# Patient Record
Sex: FEMALE | Race: BLACK OR AFRICAN AMERICAN | NOT HISPANIC OR LATINO | Employment: OTHER | URBAN - METROPOLITAN AREA
[De-identification: names, ages, dates, MRNs, and addresses within clinical notes are randomized per-mention and may not be internally consistent; named-entity substitution may affect disease eponyms.]

---

## 2017-03-21 ENCOUNTER — HOSPITAL ENCOUNTER (OUTPATIENT)
Dept: RADIOLOGY | Facility: HOSPITAL | Age: 67
Discharge: HOME | End: 2017-03-21
Attending: INTERNAL MEDICINE
Payer: MEDICARE

## 2017-03-21 ENCOUNTER — OFFICE VISIT (OUTPATIENT)
Dept: INTERNAL MEDICINE | Facility: CLINIC | Age: 67
End: 2017-03-21
Payer: MEDICARE

## 2017-03-21 VITALS
TEMPERATURE: 99 F | HEIGHT: 64 IN | SYSTOLIC BLOOD PRESSURE: 140 MMHG | DIASTOLIC BLOOD PRESSURE: 80 MMHG | BODY MASS INDEX: 33.27 KG/M2 | RESPIRATION RATE: 16 BRPM | WEIGHT: 194.88 LBS | HEART RATE: 74 BPM

## 2017-03-21 DIAGNOSIS — M17.0 PRIMARY OSTEOARTHRITIS OF BOTH KNEES: Chronic | ICD-10-CM

## 2017-03-21 DIAGNOSIS — I10 ESSENTIAL HYPERTENSION: Primary | Chronic | ICD-10-CM

## 2017-03-21 DIAGNOSIS — E78.5 HYPERLIPIDEMIA, UNSPECIFIED HYPERLIPIDEMIA TYPE: ICD-10-CM

## 2017-03-21 DIAGNOSIS — R05.9 COUGH: ICD-10-CM

## 2017-03-21 PROCEDURE — 1159F MED LIST DOCD IN RCRD: CPT | Mod: S$GLB,,, | Performed by: INTERNAL MEDICINE

## 2017-03-21 PROCEDURE — 99999 PR PBB SHADOW E&M-EST. PATIENT-LVL IV: CPT | Mod: PBBFAC,,, | Performed by: INTERNAL MEDICINE

## 2017-03-21 PROCEDURE — 1125F AMNT PAIN NOTED PAIN PRSNT: CPT | Mod: S$GLB,,, | Performed by: INTERNAL MEDICINE

## 2017-03-21 PROCEDURE — 71020 XR CHEST PA AND LATERAL: CPT | Mod: TC,PO

## 2017-03-21 PROCEDURE — 3074F SYST BP LT 130 MM HG: CPT | Mod: S$GLB,,, | Performed by: INTERNAL MEDICINE

## 2017-03-21 PROCEDURE — 73562 X-RAY EXAM OF KNEE 3: CPT | Mod: 50,TC,PO

## 2017-03-21 PROCEDURE — 1160F RVW MEDS BY RX/DR IN RCRD: CPT | Mod: S$GLB,,, | Performed by: INTERNAL MEDICINE

## 2017-03-21 PROCEDURE — 99214 OFFICE O/P EST MOD 30 MIN: CPT | Mod: S$GLB,,, | Performed by: INTERNAL MEDICINE

## 2017-03-21 PROCEDURE — 1157F ADVNC CARE PLAN IN RCRD: CPT | Mod: S$GLB,,, | Performed by: INTERNAL MEDICINE

## 2017-03-21 PROCEDURE — 71020 XR CHEST PA AND LATERAL: CPT | Mod: 26,,, | Performed by: RADIOLOGY

## 2017-03-21 PROCEDURE — 3079F DIAST BP 80-89 MM HG: CPT | Mod: S$GLB,,, | Performed by: INTERNAL MEDICINE

## 2017-03-21 PROCEDURE — 73562 X-RAY EXAM OF KNEE 3: CPT | Mod: 26,50,, | Performed by: RADIOLOGY

## 2017-03-21 RX ORDER — PROMETHAZINE HYDROCHLORIDE AND CODEINE PHOSPHATE 6.25; 1 MG/5ML; MG/5ML
5 SOLUTION ORAL EVERY 4 HOURS PRN
Qty: 240 ML | Refills: 0 | Status: SHIPPED | OUTPATIENT
Start: 2017-03-21 | End: 2017-03-31

## 2017-03-21 RX ORDER — NAPROXEN 500 MG/1
500 TABLET ORAL 2 TIMES DAILY PRN
Qty: 60 TABLET | Refills: 4 | Status: SHIPPED | OUTPATIENT
Start: 2017-03-21 | End: 2017-06-16 | Stop reason: SDUPTHER

## 2017-03-21 RX ORDER — METHOTREXATE 2.5 MG/1
TABLET ORAL
COMMUNITY
Start: 2017-02-22 | End: 2019-06-24

## 2017-03-21 RX ORDER — BENZONATATE 100 MG/1
CAPSULE ORAL
Qty: 40 CAPSULE | Refills: 1 | Status: SHIPPED | OUTPATIENT
Start: 2017-03-21 | End: 2017-08-15 | Stop reason: ALTCHOICE

## 2017-03-28 NOTE — PROGRESS NOTES
Subjective:       Patient ID: Miranda Houston is a 66 y.o. female.    Chief Complaint: Follow-up    HPI   the patient presents with complaints of cough and chest congestion of 2 months duration.  She has been using OTC medications without obtaining any relief.  Cough is productive of purulent sputum but she denies having any wheezing, shortness of breath, or chest pain.  No fever has been noted.    She has bilateral knee joint pain.  She does not have an anti-inflammatory medication on a and to use at the present time.    She has hypertension and hyperlipidemia.  She is tolerating her blood pressure medication well without side effects.  Review of Systems   Constitutional: Negative for activity change, appetite change, fatigue and unexpected weight change.   Eyes: Negative for visual disturbance.   Respiratory: Positive for cough and chest tightness. Negative for shortness of breath and wheezing.    Cardiovascular: Negative for chest pain, palpitations and leg swelling.   Gastrointestinal: Negative for abdominal pain, blood in stool, constipation and diarrhea.   Genitourinary: Negative for dysuria and hematuria.   Musculoskeletal: Positive for arthralgias. Negative for neck pain and neck stiffness.   Skin: Negative for rash.   Neurological: Negative for dizziness, syncope and headaches.   Psychiatric/Behavioral: Negative for sleep disturbance.       Objective:      Physical Exam   Constitutional: She is oriented to person, place, and time. She appears well-developed and well-nourished. No distress.   The patient has lost 5 pounds since 8/29/2016.   HENT:   Head: Normocephalic and atraumatic.   Eyes: Conjunctivae and EOM are normal. No scleral icterus.   Neck: Normal range of motion. Neck supple. No JVD present. No thyromegaly present.   Cardiovascular: Normal rate, regular rhythm, normal heart sounds and intact distal pulses.  Exam reveals no gallop and no friction rub.    No murmur heard.  Pulmonary/Chest:  Effort normal and breath sounds normal. No respiratory distress. She has no wheezes. She has no rales.   Abdominal: Soft. Bowel sounds are normal. She exhibits no mass. There is no tenderness.   Musculoskeletal: Normal range of motion. She exhibits tenderness.   Mild knee joint tenderness is present on flexion and extension.  No effusion is appreciated.   Lymphadenopathy:     She has no cervical adenopathy.   Neurological: She is alert and oriented to person, place, and time.   Skin: Skin is warm and dry. No rash noted.   Scarring of the left leg is noted; no ulceration or skin wound is present.   Nursing note and vitals reviewed.      Chest x-ray obtained today showed clear lung fields.  No effusions are present.  Heart size was normal.    Bilateral knee x-ray showed moderate to severe degenerative changes present in both knee joints.  No fracture, dislocation, or bone destruction present.  Assessment:       1. Essential hypertension    2. Cough    3. Primary osteoarthritis of both knees    4. Hyperlipidemia, unspecified hyperlipidemia type        Plan:     Miranda was seen today for follow-up evaluation.  Naproxen will be ordered for joint pain.  Tessalon Perles and promethazine with codeine will be ordered for symptomatic treatment of the cough.  Fasting blood tests will be obtained in 8/17 along with a visit for physical examination.    Diagnoses and all orders for this visit:    Essential hypertension  -     CBC auto differential; Future  -     TSH; Future  -     Urinalysis; Future    Cough  -     X-Ray Chest PA And Lateral; Future    Primary osteoarthritis of both knees  -     X-Ray Knee 3 View Bilateral; Future    Hyperlipidemia, unspecified hyperlipidemia type  -     Comprehensive metabolic panel; Future  -     Lipid panel; Future    Other orders  -     benzonatate (TESSALON) 100 MG capsule; Take 1-2 caps po tid prn cough  -     promethazine-codeine 6.25-10 mg/5 ml (PHENERGAN WITH CODEINE) 6.25-10 mg/5 mL  syrup; Take 5 mLs by mouth every 4 (four) hours as needed for Cough.  -     naproxen (NAPROSYN) 500 MG tablet; Take 1 tablet (500 mg total) by mouth 2 (two) times daily as needed. For joint pain.

## 2017-05-06 DIAGNOSIS — I10 ESSENTIAL HYPERTENSION: ICD-10-CM

## 2017-05-08 RX ORDER — LOSARTAN POTASSIUM 100 MG/1
TABLET ORAL
Qty: 90 TABLET | Refills: 0 | Status: SHIPPED | OUTPATIENT
Start: 2017-05-08 | End: 2017-06-16 | Stop reason: SDUPTHER

## 2017-05-08 RX ORDER — AMLODIPINE BESYLATE 10 MG/1
TABLET ORAL
Qty: 90 TABLET | Refills: 0 | Status: SHIPPED | OUTPATIENT
Start: 2017-05-08 | End: 2017-06-16 | Stop reason: SDUPTHER

## 2017-05-08 RX ORDER — POTASSIUM CHLORIDE 750 MG/1
CAPSULE, EXTENDED RELEASE ORAL
Qty: 180 CAPSULE | Refills: 0 | Status: SHIPPED | OUTPATIENT
Start: 2017-05-08 | End: 2017-06-16 | Stop reason: SDUPTHER

## 2017-05-08 RX ORDER — METOPROLOL SUCCINATE 50 MG/1
TABLET, EXTENDED RELEASE ORAL
Qty: 90 TABLET | Refills: 0 | Status: SHIPPED | OUTPATIENT
Start: 2017-05-08 | End: 2017-06-16 | Stop reason: SDUPTHER

## 2017-06-16 DIAGNOSIS — I10 ESSENTIAL HYPERTENSION: ICD-10-CM

## 2017-06-16 RX ORDER — AMLODIPINE BESYLATE 10 MG/1
10 TABLET ORAL DAILY
Qty: 90 TABLET | Refills: 3 | Status: SHIPPED | OUTPATIENT
Start: 2017-06-16 | End: 2017-07-03 | Stop reason: SDUPTHER

## 2017-06-16 RX ORDER — METOPROLOL SUCCINATE 50 MG/1
50 TABLET, EXTENDED RELEASE ORAL DAILY
Qty: 90 TABLET | Refills: 3 | Status: SHIPPED | OUTPATIENT
Start: 2017-06-16 | End: 2017-07-03 | Stop reason: SDUPTHER

## 2017-06-16 RX ORDER — LOSARTAN POTASSIUM 100 MG/1
100 TABLET ORAL DAILY
Qty: 90 TABLET | Refills: 3 | Status: SHIPPED | OUTPATIENT
Start: 2017-06-16 | End: 2017-07-03 | Stop reason: SDUPTHER

## 2017-06-16 RX ORDER — POTASSIUM CHLORIDE 750 MG/1
20 CAPSULE, EXTENDED RELEASE ORAL DAILY
Qty: 180 CAPSULE | Refills: 3 | Status: SHIPPED | OUTPATIENT
Start: 2017-06-16 | End: 2017-07-03 | Stop reason: SDUPTHER

## 2017-06-16 RX ORDER — NAPROXEN 500 MG/1
500 TABLET ORAL 2 TIMES DAILY PRN
Qty: 60 TABLET | Refills: 4 | Status: SHIPPED | OUTPATIENT
Start: 2017-06-16 | End: 2017-07-03 | Stop reason: SDUPTHER

## 2017-07-03 DIAGNOSIS — I10 ESSENTIAL HYPERTENSION: ICD-10-CM

## 2017-07-07 RX ORDER — NAPROXEN 500 MG/1
500 TABLET ORAL 2 TIMES DAILY PRN
Qty: 60 TABLET | Refills: 4 | Status: SHIPPED | OUTPATIENT
Start: 2017-07-07 | End: 2018-07-21 | Stop reason: SDUPTHER

## 2017-07-07 RX ORDER — AMLODIPINE BESYLATE 10 MG/1
10 TABLET ORAL DAILY
Qty: 90 TABLET | Refills: 3 | Status: SHIPPED | OUTPATIENT
Start: 2017-07-07 | End: 2018-08-30 | Stop reason: SDUPTHER

## 2017-07-07 RX ORDER — POTASSIUM CHLORIDE 750 MG/1
20 CAPSULE, EXTENDED RELEASE ORAL DAILY
Qty: 180 CAPSULE | Refills: 3 | Status: SHIPPED | OUTPATIENT
Start: 2017-07-07 | End: 2018-08-30 | Stop reason: SDUPTHER

## 2017-07-07 RX ORDER — METOPROLOL SUCCINATE 50 MG/1
50 TABLET, EXTENDED RELEASE ORAL DAILY
Qty: 90 TABLET | Refills: 3 | Status: SHIPPED | OUTPATIENT
Start: 2017-07-07 | End: 2017-08-15 | Stop reason: SDUPTHER

## 2017-07-07 RX ORDER — LOSARTAN POTASSIUM 100 MG/1
100 TABLET ORAL DAILY
Qty: 90 TABLET | Refills: 3 | Status: SHIPPED | OUTPATIENT
Start: 2017-07-07 | End: 2018-08-30 | Stop reason: SDUPTHER

## 2017-08-15 ENCOUNTER — OFFICE VISIT (OUTPATIENT)
Dept: INTERNAL MEDICINE | Facility: CLINIC | Age: 67
End: 2017-08-15
Payer: MEDICARE

## 2017-08-15 ENCOUNTER — LAB VISIT (OUTPATIENT)
Dept: LAB | Facility: HOSPITAL | Age: 67
End: 2017-08-15
Attending: INTERNAL MEDICINE
Payer: MEDICARE

## 2017-08-15 VITALS
TEMPERATURE: 99 F | DIASTOLIC BLOOD PRESSURE: 70 MMHG | SYSTOLIC BLOOD PRESSURE: 130 MMHG | HEART RATE: 76 BPM | HEIGHT: 65 IN | BODY MASS INDEX: 32.76 KG/M2 | WEIGHT: 196.63 LBS

## 2017-08-15 DIAGNOSIS — R41.9 UNSPECIFIED SYMPTOMS AND SIGNS INVOLVING COGNITIVE FUNCTIONS AND AWARENESS: ICD-10-CM

## 2017-08-15 DIAGNOSIS — I10 ESSENTIAL HYPERTENSION: Chronic | ICD-10-CM

## 2017-08-15 DIAGNOSIS — E78.5 HYPERLIPIDEMIA, UNSPECIFIED HYPERLIPIDEMIA TYPE: ICD-10-CM

## 2017-08-15 DIAGNOSIS — J31.0 CHRONIC RHINITIS, UNSPECIFIED TYPE: ICD-10-CM

## 2017-08-15 DIAGNOSIS — Z00.00 WELL ADULT EXAM: Primary | ICD-10-CM

## 2017-08-15 DIAGNOSIS — Z00.00 WELL ADULT EXAM: ICD-10-CM

## 2017-08-15 DIAGNOSIS — R51.9 INTRACTABLE HEADACHE, UNSPECIFIED CHRONICITY PATTERN, UNSPECIFIED HEADACHE TYPE: ICD-10-CM

## 2017-08-15 LAB
ALBUMIN SERPL BCP-MCNC: 4 G/DL
ALP SERPL-CCNC: 69 U/L
ALT SERPL W/O P-5'-P-CCNC: 17 U/L
ANION GAP SERPL CALC-SCNC: 8 MMOL/L
AST SERPL-CCNC: 20 U/L
BASOPHILS # BLD AUTO: 0.03 K/UL
BASOPHILS NFR BLD: 0.5 %
BILIRUB SERPL-MCNC: 0.4 MG/DL
BUN SERPL-MCNC: 11 MG/DL
CALCIUM SERPL-MCNC: 10 MG/DL
CHLORIDE SERPL-SCNC: 109 MMOL/L
CHOLEST/HDLC SERPL: 2.6 {RATIO}
CO2 SERPL-SCNC: 26 MMOL/L
CREAT SERPL-MCNC: 0.9 MG/DL
DIFFERENTIAL METHOD: ABNORMAL
EOSINOPHIL # BLD AUTO: 0.1 K/UL
EOSINOPHIL NFR BLD: 2 %
ERYTHROCYTE [DISTWIDTH] IN BLOOD BY AUTOMATED COUNT: 15.3 %
EST. GFR  (AFRICAN AMERICAN): >60 ML/MIN/1.73 M^2
EST. GFR  (NON AFRICAN AMERICAN): >60 ML/MIN/1.73 M^2
GLUCOSE SERPL-MCNC: 93 MG/DL
HCT VFR BLD AUTO: 37.9 %
HDL/CHOLESTEROL RATIO: 38.2 %
HDLC SERPL-MCNC: 199 MG/DL
HDLC SERPL-MCNC: 76 MG/DL
HGB BLD-MCNC: 12 G/DL
LDLC SERPL CALC-MCNC: 98.4 MG/DL
LYMPHOCYTES # BLD AUTO: 1.8 K/UL
LYMPHOCYTES NFR BLD: 29.9 %
MCH RBC QN AUTO: 29.9 PG
MCHC RBC AUTO-ENTMCNC: 31.7 G/DL
MCV RBC AUTO: 94 FL
MONOCYTES # BLD AUTO: 0.7 K/UL
MONOCYTES NFR BLD: 11.3 %
NEUTROPHILS # BLD AUTO: 3.3 K/UL
NEUTROPHILS NFR BLD: 56.1 %
NONHDLC SERPL-MCNC: 123 MG/DL
PLATELET # BLD AUTO: 347 K/UL
PMV BLD AUTO: 10.1 FL
POTASSIUM SERPL-SCNC: 4.2 MMOL/L
PROT SERPL-MCNC: 8.2 G/DL
RBC # BLD AUTO: 4.02 M/UL
SODIUM SERPL-SCNC: 143 MMOL/L
TRIGL SERPL-MCNC: 123 MG/DL
TSH SERPL DL<=0.005 MIU/L-ACNC: 1.56 UIU/ML
VIT B12 SERPL-MCNC: 615 PG/ML
WBC # BLD AUTO: 5.95 K/UL

## 2017-08-15 PROCEDURE — 99397 PER PM REEVAL EST PAT 65+ YR: CPT | Mod: S$GLB,,, | Performed by: INTERNAL MEDICINE

## 2017-08-15 PROCEDURE — 80053 COMPREHEN METABOLIC PANEL: CPT

## 2017-08-15 PROCEDURE — 99999 PR PBB SHADOW E&M-EST. PATIENT-LVL III: CPT | Mod: PBBFAC,,, | Performed by: INTERNAL MEDICINE

## 2017-08-15 PROCEDURE — 80061 LIPID PANEL: CPT

## 2017-08-15 PROCEDURE — 85025 COMPLETE CBC W/AUTO DIFF WBC: CPT

## 2017-08-15 PROCEDURE — 82607 VITAMIN B-12: CPT

## 2017-08-15 PROCEDURE — 36415 COLL VENOUS BLD VENIPUNCTURE: CPT | Mod: PO

## 2017-08-15 PROCEDURE — 84443 ASSAY THYROID STIM HORMONE: CPT

## 2017-08-15 RX ORDER — BUTALBITAL, ACETAMINOPHEN AND CAFFEINE 50; 325; 40 MG/1; MG/1; MG/1
1 TABLET ORAL EVERY 4 HOURS PRN
Qty: 40 TABLET | Refills: 1 | Status: SHIPPED | OUTPATIENT
Start: 2017-08-15 | End: 2017-09-14

## 2017-08-15 RX ORDER — METOPROLOL SUCCINATE 100 MG/1
100 TABLET, EXTENDED RELEASE ORAL DAILY
Qty: 30 TABLET | Refills: 11 | Status: SHIPPED | OUTPATIENT
Start: 2017-08-15 | End: 2018-10-22 | Stop reason: SDUPTHER

## 2017-08-15 NOTE — PROGRESS NOTES
Subjective:       Patient ID: Miranda Houston is a 66 y.o. female.    Chief Complaint: Memory Loss; Headache; and Annual Exam    HPI   The patient presents for annual physical examination and follow-up of medical conditions.  The patient has essential hypertension and hyperlipidemia.  She is tolerating her blood pressure medication well without side effects.  She is accompanied today by her son.  The son reports that the patient is having memory problems.  She forgets conversations that they have had.  He relates that she also forgets to pay important bills.  She has not gotten lost.  No safety issues have been noted.    The patient has been experiencing intractable headache pain for the past week.  Pain is in the frontal area and radiates to the occipital area becoming more diffuse.  She denies having any associated nausea, vomiting, or vision changes.  She has taken 2 types of Excedrin for headache relief but the pain has never subsided.  She has been awakened by the headache pain.  She has not experienced any recent rhinitis symptoms such as sinus congestion or drainage.  She usually averages 8 hours of sleep at night.  There has been no recent change in her diet.    Review of Systems   Constitutional: Negative for activity change, appetite change, chills, fatigue, fever and unexpected weight change.   HENT: Negative for congestion, sinus pressure and tinnitus.    Eyes: Negative for visual disturbance.   Respiratory: Negative for cough and shortness of breath.    Cardiovascular: Negative for chest pain, palpitations and leg swelling.   Gastrointestinal: Negative for abdominal pain, blood in stool, constipation, diarrhea, nausea and vomiting.   Genitourinary: Negative for dysuria and hematuria.   Musculoskeletal: Negative for arthralgias, gait problem, neck pain and neck stiffness.   Skin: Negative for rash.   Neurological: Positive for headaches. Negative for dizziness, tremors, seizures, syncope, speech  difficulty, weakness and numbness.   Psychiatric/Behavioral: Negative for dysphoric mood, hallucinations and sleep disturbance. The patient is not nervous/anxious.        Objective:      Physical Exam   Constitutional: She is oriented to person, place, and time. Vital signs are normal. She appears well-developed and well-nourished. No distress.   HENT:   Head: Normocephalic and atraumatic.   Right Ear: External ear normal.   Left Ear: External ear normal.   Nose: Nose normal.   Mouth/Throat: Oropharynx is clear and moist. No oropharyngeal exudate.   Eyes: Conjunctivae and EOM are normal. Pupils are equal, round, and reactive to light. No scleral icterus.   Discs are sharp bilaterally on limited nondilated funduscopic exam.   Neck: Normal range of motion. Neck supple. No JVD present. Carotid bruit is not present. No thyromegaly present.   Cardiovascular: Normal rate, regular rhythm, normal heart sounds, intact distal pulses and normal pulses.  Exam reveals no gallop and no friction rub.    No murmur heard.  Pulmonary/Chest: Effort normal and breath sounds normal. No respiratory distress. She has no wheezes. She has no rales.   Abdominal: Soft. Bowel sounds are normal. She exhibits no abdominal bruit and no mass. There is no splenomegaly or hepatomegaly. There is no tenderness. No hernia.   Musculoskeletal: Normal range of motion. She exhibits no edema or tenderness.        Right shoulder: She exhibits no effusion and no deformity.   Lymphadenopathy:     She has no cervical adenopathy.     She has no axillary adenopathy.        Right: No supraclavicular adenopathy present.        Left: No supraclavicular adenopathy present.   Neurological: She is alert and oriented to person, place, and time. She has normal strength. No cranial nerve deficit. Coordination normal.   No scalp or temporal artery tenderness is present on palpation.  Strength is 5/5 in all 4 extremities on testing.  Gait is normal.   Skin: Skin is warm and  dry. No rash noted.   Psychiatric: She has a normal mood and affect. Her speech is normal and behavior is normal. Judgment and thought content normal.   Nursing note and vitals reviewed.      Assessment:       1. Well adult exam    2. Essential hypertension    3. Intractable headache, unspecified chronicity pattern, unspecified headache type    4. Unspecified symptoms and signs involving cognitive functions and awareness     5. Hyperlipidemia, unspecified hyperlipidemia type    6. Chronic rhinitis, unspecified type        Plan:       Miranda was seen today for memory loss, headache and annual exam.  Persistent headache and memory impairment warrant further brain imaging study to rule out significant cerebrovascular disease versus neoplasm.  Consultation with neurology will also be obtained for further evaluation and treatment of her headaches and memory impairment.  The dose of metoprolol will be increased to 100 mg daily.  Laboratory studies will be obtained today.  Follow-up visit within 3 weeks is recommended to reassess blood pressure response.  Fioricet will be ordered for headache.    Diagnoses and all orders for this visit:    Well adult exam  -     CBC auto differential; Future  -     Comprehensive metabolic panel; Future  -     Lipid panel; Future  -     TSH; Future  -     Urinalysis; Future  -     Vitamin B12; Future    Essential hypertension  -     CBC auto differential; Future  -     Comprehensive metabolic panel; Future  -     Lipid panel; Future  -     TSH; Future  -     Urinalysis; Future  -     Vitamin B12; Future    Intractable headache, unspecified chronicity pattern, unspecified headache type  -     MRI Brain Without Contrast; Future  -     Ambulatory consult to Neurology    Unspecified symptoms and signs involving cognitive functions and awareness   -     Vitamin B12; Future  -     MRI Brain Without Contrast; Future  -     Ambulatory consult to Neurology    Hyperlipidemia, unspecified  hyperlipidemia type  -     CBC auto differential; Future  -     Comprehensive metabolic panel; Future  -     Lipid panel; Future  -     TSH; Future  -     Urinalysis; Future  -     Vitamin B12; Future    Chronic rhinitis, unspecified type    Other orders  -     metoprolol succinate (TOPROL-XL) 100 MG 24 hr tablet; Take 1 tablet (100 mg total) by mouth once daily.  -     butalbital-acetaminophen-caffeine -40 mg (FIORICET, ESGIC) -40 mg per tablet; Take 1 tablet by mouth every 4 (four) hours as needed for Headaches.

## 2017-09-11 ENCOUNTER — HOSPITAL ENCOUNTER (OUTPATIENT)
Dept: RADIOLOGY | Facility: HOSPITAL | Age: 67
Discharge: HOME | End: 2017-09-11
Attending: INTERNAL MEDICINE
Payer: MEDICARE

## 2017-09-11 DIAGNOSIS — R51.9 INTRACTABLE HEADACHE, UNSPECIFIED CHRONICITY PATTERN, UNSPECIFIED HEADACHE TYPE: ICD-10-CM

## 2017-09-11 DIAGNOSIS — R41.9 UNSPECIFIED SYMPTOMS AND SIGNS INVOLVING COGNITIVE FUNCTIONS AND AWARENESS: ICD-10-CM

## 2017-09-11 PROCEDURE — 70551 MRI BRAIN STEM W/O DYE: CPT | Mod: TC

## 2017-09-11 PROCEDURE — 70551 MRI BRAIN STEM W/O DYE: CPT | Mod: 26,,, | Performed by: RADIOLOGY

## 2017-09-12 ENCOUNTER — TELEPHONE (OUTPATIENT)
Dept: INTERNAL MEDICINE | Facility: CLINIC | Age: 67
End: 2017-09-12

## 2017-09-12 NOTE — TELEPHONE ENCOUNTER
----- Message from Cari Jacinto sent at 9/12/2017  9:52 AM CDT -----  Contact: patient- [phone] can leave msg on answering machine  Patient would like to get test results.  Name of test (lab, mammo, etc.): MRI  Date of test:  9-11  Ordering provider:   Where was the test performed[facility]  Comments:  Can leave msg of results on voicemail if she doesn't answer

## 2017-09-12 NOTE — TELEPHONE ENCOUNTER
----- Message from Cari Jacinto sent at 9/12/2017  9:52 AM CDT -----  Contact: patient- [phone] can leave msg on answering machine  Patient would like to get test results.  Name of test (lab, mammo, etc.): MRI  Date of test:  9-11  Ordering provider:   Where was the test performed[facility]  Comments:  Can leave msg of results on voicemail if she doesn't answer

## 2017-09-13 ENCOUNTER — TELEPHONE (OUTPATIENT)
Dept: INTERNAL MEDICINE | Facility: CLINIC | Age: 67
End: 2017-09-13

## 2017-09-13 NOTE — TELEPHONE ENCOUNTER
----- Message from Thuy Keith sent at 9/12/2017 11:23 AM CDT -----  Contact: Pt [phone]  Patient would like to get test results.  Name of test (lab, mammo, etc.):  MRI   Date of test:  9/11/17  Ordering provider: Dr Calle  Where was the test performed: [facility]  Comments:  Pt states that she is felling very anxious about getting the results.

## 2017-09-13 NOTE — TELEPHONE ENCOUNTER
Brain MRI results were negative for evidence of brain tumor or stroke.  Mild chronic microvascular ischemic changes were present.  These results were discussed with the patient.

## 2017-09-20 ENCOUNTER — OFFICE VISIT (OUTPATIENT)
Dept: NEUROLOGY | Facility: CLINIC | Age: 67
End: 2017-09-20
Payer: MEDICARE

## 2017-09-20 VITALS
WEIGHT: 195.75 LBS | HEIGHT: 65 IN | SYSTOLIC BLOOD PRESSURE: 138 MMHG | HEART RATE: 80 BPM | BODY MASS INDEX: 32.61 KG/M2 | DIASTOLIC BLOOD PRESSURE: 82 MMHG

## 2017-09-20 DIAGNOSIS — G43.009 MIXED COMMON MIGRAINE AND MUSCLE CONTRACTION HEADACHE: Primary | ICD-10-CM

## 2017-09-20 DIAGNOSIS — I10 ESSENTIAL HYPERTENSION: Chronic | ICD-10-CM

## 2017-09-20 DIAGNOSIS — R41.840 ATTENTION AND CONCENTRATION DEFICIT: ICD-10-CM

## 2017-09-20 DIAGNOSIS — G44.209 MIXED COMMON MIGRAINE AND MUSCLE CONTRACTION HEADACHE: Primary | ICD-10-CM

## 2017-09-20 DIAGNOSIS — E78.5 HYPERLIPIDEMIA, UNSPECIFIED HYPERLIPIDEMIA TYPE: ICD-10-CM

## 2017-09-20 PROCEDURE — 1159F MED LIST DOCD IN RCRD: CPT | Mod: S$GLB,,, | Performed by: PSYCHIATRY & NEUROLOGY

## 2017-09-20 PROCEDURE — 3075F SYST BP GE 130 - 139MM HG: CPT | Mod: S$GLB,,, | Performed by: PSYCHIATRY & NEUROLOGY

## 2017-09-20 PROCEDURE — 99204 OFFICE O/P NEW MOD 45 MIN: CPT | Mod: S$GLB,,, | Performed by: PSYCHIATRY & NEUROLOGY

## 2017-09-20 PROCEDURE — 3008F BODY MASS INDEX DOCD: CPT | Mod: S$GLB,,, | Performed by: PSYCHIATRY & NEUROLOGY

## 2017-09-20 PROCEDURE — 1125F AMNT PAIN NOTED PAIN PRSNT: CPT | Mod: S$GLB,,, | Performed by: PSYCHIATRY & NEUROLOGY

## 2017-09-20 PROCEDURE — 99999 PR PBB SHADOW E&M-EST. PATIENT-LVL III: CPT | Mod: PBBFAC,,, | Performed by: PSYCHIATRY & NEUROLOGY

## 2017-09-20 PROCEDURE — 3079F DIAST BP 80-89 MM HG: CPT | Mod: S$GLB,,, | Performed by: PSYCHIATRY & NEUROLOGY

## 2017-09-20 RX ORDER — TOPIRAMATE 25 MG/1
25 TABLET ORAL NIGHTLY
Qty: 30 TABLET | Refills: 3 | Status: SHIPPED | OUTPATIENT
Start: 2017-09-20 | End: 2019-06-24

## 2017-09-20 NOTE — PROGRESS NOTES
Subjective:       Patient ID: Miranda Houston is a 66 y.o. female.    Chief Complaint:  Memory Loss      History of Present Illness  HPI   This is a 66-year-old -American female was referred for evaluation of memory difficulties that her son had reported to her primary care physician, Dr. Calle.  Her son is presently in residency having completed medical school.  The patient came to clinic alone today.  The son had reported that the patient that she would occasionally forget conversations that they have had.  He relates that she also had forgotten to be a couple of important bills.  The patient lives alone and is otherwise able to take care of her day-to-day activities including managing her medications, cooking, doing the groceries and managing her finances.  She does report that she had forgotten to be a couple of bills in the past several months as she had misplaced the the bill. Sleep and appetite is good.       In addition she has been experiencing intermittent headaches early August 2017.  The headache is localized to the frontal area and radiates to the occipital area becoming more diffuse.  It is described as an aching a pressure-like sensation without throbbing. It is not present daily however fluctuates in intensity but does respond to OTC medications.  She denies having any associated nausea, vomiting, or vision changes.  The headache is not associated with any focal neurological or visual symptoms.  She has had a history of sinus problems in the past however cannot describe any trigger for this headache.  An MRI scan of the brain was normal but did show some mild small vessel ischemic changes.  She has a history of hypertension and hyperlipidemia.  Recent labs done were reviewed.  She denies any past history of migraines.          Review of Systems  Review of Systems   Constitutional: Negative.    HENT: Negative.  Negative for hearing loss.    Eyes: Negative.  Negative for visual disturbance.    Respiratory: Negative.  Negative for shortness of breath.    Cardiovascular: Negative.  Negative for chest pain and palpitations.   Gastrointestinal: Negative.    Genitourinary: Negative.    Musculoskeletal: Negative.  Negative for back pain, gait problem and neck pain.   Skin: Negative.    Neurological: Positive for headaches. Negative for tremors, seizures, syncope, speech difficulty, weakness and numbness.   Psychiatric/Behavioral: Positive for decreased concentration. Negative for confusion.       Objective:      Neurologic Exam     Mental Status   Oriented to person, place, and time.   Registration: recalls 3 of 3 objects. Recall at 5 minutes: recalls 2 of 3 objects. Follows 3 step commands.   Attention: normal. Concentration: normal.   Speech: speech is normal   Level of consciousness: alert  Knowledge: good. Able to perform simple calculations.   Able to name object. Able to read. Able to repeat. Able to write. Normal comprehension.   MMSE 29/30     Cranial Nerves   Cranial nerves II through XII intact.     Motor Exam   Muscle bulk: normal  Overall muscle tone: normal    Strength   Strength 5/5 throughout.     Sensory Exam   Light touch normal.   Proprioception normal.   Pinprick normal.     Gait, Coordination, and Reflexes     Gait  Gait: normal    Coordination   Romberg: negative  Finger to nose coordination: normal    Tremor   Resting tremor: absent  Intention tremor: absent  Action tremor: absent    Reflexes   Right brachioradialis: 1+  Left brachioradialis: 1+  Right biceps: 1+  Left biceps: 1+  Right triceps: 1+  Left triceps: 1+  Right patellar: 1+  Left patellar: 1+  Right achilles: 1+  Left achilles: 1+  Right plantar: normal  Left plantar: normal      Physical Exam   Constitutional: She is oriented to person, place, and time. She appears well-developed and well-nourished.   HENT:   Head: Normocephalic and atraumatic.   Neck: Normal range of motion. Neck supple. Carotid bruit is not present.    Neurological: She is oriented to person, place, and time. She has normal strength. She has a normal Finger-Nose-Finger Test and a normal Romberg Test. Gait normal.   Reflex Scores:       Tricep reflexes are 1+ on the right side and 1+ on the left side.       Bicep reflexes are 1+ on the right side and 1+ on the left side.       Brachioradialis reflexes are 1+ on the right side and 1+ on the left side.       Patellar reflexes are 1+ on the right side and 1+ on the left side.       Achilles reflexes are 1+ on the right side and 1+ on the left side.  Psychiatric: Her speech is normal.   Vitals reviewed.        Assessment:        1. Mixed common migraine and muscle contraction headache    2. Attention and concentration deficit    3. Essential hypertension    4. Hyperlipidemia, unspecified hyperlipidemia type            Plan:       Discussed with patient regarding her headaches and mild memory difficulties.  The headaches are nonspecific.  The MRI scan of the brain was essentially normal for age and minimal changes noted related to her history of hypertension and hyperlipidemia.  Will initiate topiramate 25 mg at bedtime for headache prophylaxis and continue with OTC headache medication.  If the headaches do improve we may consider getting her off the topiramate in the future.  In regards to her cognitive difficulties are minimal and may be age-related.  She is otherwise quite independent and functional at home.  Observation for now.  Have advised her to have her son contact me if any questions.  Follow-up in 3 months.

## 2017-09-20 NOTE — PATIENT INSTRUCTIONS
Discussed with patient regarding her headaches and mild memory difficulties.  The headaches are nonspecific.  The MRI scan of the brain was essentially normal for age and minimal changes noted related to her history of hypertension and hyperlipidemia.  Will initiate topiramate 25 mg at bedtime for headache prophylaxis and continue with OTC headache medication.  If the headaches do improve we may consider getting her off the topiramate in the future.  In regards to her cognitive difficulties are minimal and may be age-related.  She is otherwise quite independent and functional at home.  Observation for now.  Have advised her to have her son contact me if any questions.

## 2018-06-29 DIAGNOSIS — Z12.39 BREAST CANCER SCREENING: ICD-10-CM

## 2018-07-17 ENCOUNTER — TELEPHONE (OUTPATIENT)
Dept: NEUROLOGY | Facility: CLINIC | Age: 68
End: 2018-07-17

## 2018-07-23 RX ORDER — NAPROXEN 500 MG/1
TABLET ORAL
Qty: 60 TABLET | Refills: 4 | Status: SHIPPED | OUTPATIENT
Start: 2018-07-23 | End: 2018-10-22 | Stop reason: SDUPTHER

## 2018-08-21 ENCOUNTER — TELEPHONE (OUTPATIENT)
Dept: NEUROLOGY | Facility: CLINIC | Age: 68
End: 2018-08-21

## 2018-08-21 NOTE — TELEPHONE ENCOUNTER
Patient no showed for appointment today. LM to contact our office to discuss rescheduling appointment.

## 2018-08-30 DIAGNOSIS — I10 ESSENTIAL HYPERTENSION: ICD-10-CM

## 2018-08-30 RX ORDER — AMLODIPINE BESYLATE 10 MG/1
TABLET ORAL
Qty: 90 TABLET | Refills: 0 | Status: SHIPPED | OUTPATIENT
Start: 2018-08-30 | End: 2018-10-22 | Stop reason: SDUPTHER

## 2018-08-30 RX ORDER — METOPROLOL SUCCINATE 50 MG/1
TABLET, EXTENDED RELEASE ORAL
Qty: 90 TABLET | Refills: 0 | Status: SHIPPED | OUTPATIENT
Start: 2018-08-30 | End: 2018-11-06 | Stop reason: SDUPTHER

## 2018-08-30 RX ORDER — LOSARTAN POTASSIUM 100 MG/1
TABLET ORAL
Qty: 90 TABLET | Refills: 0 | Status: SHIPPED | OUTPATIENT
Start: 2018-08-30 | End: 2018-10-22 | Stop reason: SDUPTHER

## 2018-10-22 DIAGNOSIS — I10 ESSENTIAL HYPERTENSION: ICD-10-CM

## 2018-10-22 RX ORDER — NAPROXEN 500 MG/1
TABLET ORAL
Qty: 60 TABLET | Refills: 4 | Status: SHIPPED | OUTPATIENT
Start: 2018-10-22 | End: 2019-06-24

## 2018-10-22 RX ORDER — AMLODIPINE BESYLATE 10 MG/1
TABLET ORAL
Qty: 90 TABLET | Refills: 0 | Status: SHIPPED | OUTPATIENT
Start: 2018-10-22 | End: 2019-06-24 | Stop reason: DRUGHIGH

## 2018-10-22 RX ORDER — LOSARTAN POTASSIUM 100 MG/1
TABLET ORAL
Qty: 90 TABLET | Refills: 0 | Status: SHIPPED | OUTPATIENT
Start: 2018-10-22 | End: 2019-06-24

## 2018-10-22 RX ORDER — METOPROLOL SUCCINATE 100 MG/1
100 TABLET, EXTENDED RELEASE ORAL DAILY
Qty: 30 TABLET | Refills: 11 | Status: SHIPPED | OUTPATIENT
Start: 2018-10-22 | End: 2019-06-24

## 2018-10-22 NOTE — TELEPHONE ENCOUNTER
"----- Message from Zhanna Hart sent at 10/22/2018 10:02 AM CDT -----  Contact: [phone]  RX request - refill or new RX.  Is this a refill or new RX:  New  RX name and strength: naproxen (NAPROSYN) 500 MG tablet  Directions:   Is this a 30 day or 90 day RX:    Local pharmacy or mail order pharmacy:    Pharmacy name and phone # (DON'T enter "on file" or "in chart"): [place] [id number]  [facility] [phone] (Phone)  [phone] (Fax)  Comments:      RX request - refill or new RX.  Is this a refill or new RX:  New  RX name and strength: amLODIPine (NORVASC) 10 MG tablet   Directions:   Is this a 30 day or 90 day RX:    Local pharmacy or mail order pharmacy:    Pharmacy name and phone # (DON'T enter "on file" or "in chart"): [place] [id number]  [facility] [phone] (Phone)  [phone] (Fax)  Comments:      RX request - refill or new RX.  Is this a refill or new RX:  New  RX name and strength: metoprolol succinate (TOPROL-XL) 100 MG 24 hr tabletDirections:   Is this a 30 day or 90 day RX:    Local pharmacy or mail order pharmacy:    Pharmacy name and phone # (DON'T enter "on file" or "in chart"): [place] [id number]  [facility] [phone] (Phone)  [phone] (Fax)  Comments:      RX request - refill or new RX.  Is this a refill or new RX:  New  RX name and strength: losartan (COZAAR) 100 MG tablet  Directions:   Is this a 30 day or 90 day RX:    Local pharmacy or mail order pharmacy:    Pharmacy name and phone # (DON'T enter "on file" or "in chart"): [place] [id number] [facility] [phone] (Phone)  [phone] (Fax)  Comments:        "

## 2018-11-07 RX ORDER — METOPROLOL SUCCINATE 50 MG/1
TABLET, EXTENDED RELEASE ORAL
Qty: 90 TABLET | Refills: 0 | Status: SHIPPED | OUTPATIENT
Start: 2018-11-07 | End: 2019-06-24

## 2018-11-07 RX ORDER — POTASSIUM CHLORIDE 750 MG/1
CAPSULE, EXTENDED RELEASE ORAL
Qty: 180 CAPSULE | Refills: 0 | Status: SHIPPED | OUTPATIENT
Start: 2018-11-07 | End: 2019-06-24

## 2019-01-03 ENCOUNTER — OFFICE VISIT (OUTPATIENT)
Dept: NEUROLOGY | Facility: CLINIC | Age: 69
End: 2019-01-03
Payer: MEDICARE

## 2019-01-03 VITALS
HEART RATE: 66 BPM | SYSTOLIC BLOOD PRESSURE: 224 MMHG | BODY MASS INDEX: 27.36 KG/M2 | DIASTOLIC BLOOD PRESSURE: 93 MMHG | HEIGHT: 65 IN | WEIGHT: 164.25 LBS

## 2019-01-03 DIAGNOSIS — G31.84 MCI (MILD COGNITIVE IMPAIRMENT) WITH MEMORY LOSS: Primary | ICD-10-CM

## 2019-01-03 DIAGNOSIS — E78.5 HYPERLIPIDEMIA, UNSPECIFIED HYPERLIPIDEMIA TYPE: ICD-10-CM

## 2019-01-03 DIAGNOSIS — R41.840 ATTENTION AND CONCENTRATION DEFICIT: ICD-10-CM

## 2019-01-03 DIAGNOSIS — I10 ESSENTIAL HYPERTENSION: Chronic | ICD-10-CM

## 2019-01-03 PROCEDURE — 99999 PR PBB SHADOW E&M-EST. PATIENT-LVL III: ICD-10-PCS | Mod: PBBFAC,,, | Performed by: PSYCHIATRY & NEUROLOGY

## 2019-01-03 PROCEDURE — 99214 PR OFFICE/OUTPT VISIT, EST, LEVL IV, 30-39 MIN: ICD-10-PCS | Mod: S$GLB,,, | Performed by: PSYCHIATRY & NEUROLOGY

## 2019-01-03 PROCEDURE — 1101F PT FALLS ASSESS-DOCD LE1/YR: CPT | Mod: CPTII,S$GLB,, | Performed by: PSYCHIATRY & NEUROLOGY

## 2019-01-03 PROCEDURE — 3080F DIAST BP >= 90 MM HG: CPT | Mod: CPTII,S$GLB,, | Performed by: PSYCHIATRY & NEUROLOGY

## 2019-01-03 PROCEDURE — 99214 OFFICE O/P EST MOD 30 MIN: CPT | Mod: S$GLB,,, | Performed by: PSYCHIATRY & NEUROLOGY

## 2019-01-03 PROCEDURE — 3077F PR MOST RECENT SYSTOLIC BLOOD PRESSURE >= 140 MM HG: ICD-10-PCS | Mod: CPTII,S$GLB,, | Performed by: PSYCHIATRY & NEUROLOGY

## 2019-01-03 PROCEDURE — 3077F SYST BP >= 140 MM HG: CPT | Mod: CPTII,S$GLB,, | Performed by: PSYCHIATRY & NEUROLOGY

## 2019-01-03 PROCEDURE — 3080F PR MOST RECENT DIASTOLIC BLOOD PRESSURE >= 90 MM HG: ICD-10-PCS | Mod: CPTII,S$GLB,, | Performed by: PSYCHIATRY & NEUROLOGY

## 2019-01-03 PROCEDURE — 99999 PR PBB SHADOW E&M-EST. PATIENT-LVL III: CPT | Mod: PBBFAC,,, | Performed by: PSYCHIATRY & NEUROLOGY

## 2019-01-03 PROCEDURE — 1101F PR PT FALLS ASSESS DOC 0-1 FALLS W/OUT INJ PAST YR: ICD-10-PCS | Mod: CPTII,S$GLB,, | Performed by: PSYCHIATRY & NEUROLOGY

## 2019-01-03 RX ORDER — MEMANTINE HYDROCHLORIDE 10 MG/1
10 TABLET ORAL EVERY MORNING
Qty: 90 TABLET | Refills: 3 | Status: SHIPPED | OUTPATIENT
Start: 2019-01-03 | End: 2019-06-24 | Stop reason: SDUPTHER

## 2019-01-03 NOTE — PATIENT INSTRUCTIONS
Discussed with patient and son regarding her mild memory difficulties.  The MRI scan of the brain was essentially normal for age and minimal changes noted related to her history of hypertension and hyperlipidemia.  Patient advised to make an appointment to see a primary care physician for better blood pressure control which is most likely related to compliance difficulty.  Son will now manage her medications and get her a pill box.  In addition he is advised to get a started in a day program that would help.  Will initiate Namenda 10 mg in the morning daily.  He will keep in touch with me via [facility].

## 2019-01-03 NOTE — PROGRESS NOTES
Subjective:       Patient ID: Miranda Houston is a 68 y.o. female.    Chief Complaint:  Memory Loss      History of Present Illness  HPI   This is a 68-year-old -American female who had been seen by me with of memory difficulties.  The patient was last seen by me in September 2017 and did not return or missed appointments.  She came to the clinic accompanied by her son who is a physician in a residency program.  The son had reported that the patient that she would occasionally forget conversations that they may have had.  He relates that she also had forgotten to be a couple of important bills.  The patient lives alone and is otherwise able to take care of her day-to-day activities including cooking, doing the groceries and managing her finances.  However over the past 6 months she has gradually decline.  She no longer drives.  In addition he reports that she has missed several appointments with the primary care physician and has not been taking blood pressure medicines on a regular basis.  Her blood pressure today was elevated.  She has not seen her primary care physician in almost a couple of years.       She also had a history of in headache since early August 2017.  The headache is localized to the frontal area and radiates to the occipital area becoming more diffuse.  It is described as an aching a pressure-like sensation without throbbing. It is not present daily however fluctuates in intensity but does respond to OTC medications.  She denies having any associated nausea, vomiting, or vision changes.  The headache is not associated with any focal neurological or visual symptoms.  An MRI scan of the brain was normal but did show some mild small vessel ischemic changes.  She has a history of hypertension and hyperlipidemia.  Her headaches have overall significantly improved, and she has only been taking OTC NSAIDs as needed.  The son reports that she rarely gets out of the house.          Review of  Systems  Review of Systems   Constitutional: Negative.    HENT: Negative.  Negative for hearing loss.    Eyes: Negative.  Negative for visual disturbance.   Respiratory: Negative.  Negative for shortness of breath.    Cardiovascular: Negative.  Negative for chest pain and palpitations.   Gastrointestinal: Negative.    Genitourinary: Negative.    Musculoskeletal: Negative.  Negative for back pain, gait problem and neck pain.   Skin: Negative.    Neurological: Positive for headaches. Negative for tremors, seizures, syncope, speech difficulty, weakness and numbness.   Psychiatric/Behavioral: Positive for decreased concentration. Negative for confusion.       Objective:      Neurologic Exam     Mental Status   Oriented to person, place, and time.   Oriented to person.   Oriented to place.   Disoriented to date and day. Oriented to year and month.   Registration: recalls 3 of 3 objects. Recall at 5 minutes: recalls 2 of 3 objects. Follows 3 step commands.   Attention: normal. Concentration: normal.   Speech: speech is normal   Level of consciousness: alert  Knowledge: good.   Able to name object. Able to read. Able to repeat. Able to write. Normal comprehension.   No difficulty spelling backwards     Cranial Nerves   Cranial nerves II through XII intact.     Motor Exam   Muscle bulk: normal  Overall muscle tone: normal    Strength   Strength 5/5 throughout.     Sensory Exam   Light touch normal.   Proprioception normal.   Pinprick normal.     Gait, Coordination, and Reflexes     Gait  Gait: normal    Coordination   Romberg: negative  Finger to nose coordination: normal    Tremor   Resting tremor: absent  Intention tremor: absent  Action tremor: absent    Reflexes   Right brachioradialis: 1+  Left brachioradialis: 1+  Right biceps: 1+  Left biceps: 1+  Right triceps: 1+  Left triceps: 1+  Right patellar: 1+  Left patellar: 1+  Right achilles: 1+  Left achilles: 1+  Right plantar: normal  Left plantar: normal      Physical  Exam   Constitutional: She is oriented to person, place, and time. She appears well-developed and well-nourished.   HENT:   Head: Normocephalic and atraumatic.   Neck: Normal range of motion. Neck supple. Carotid bruit is not present.   Neurological: She is oriented to person, place, and time. She has normal strength. She has a normal Finger-Nose-Finger Test and a normal Romberg Test. Gait normal.   Reflex Scores:       Tricep reflexes are 1+ on the right side and 1+ on the left side.       Bicep reflexes are 1+ on the right side and 1+ on the left side.       Brachioradialis reflexes are 1+ on the right side and 1+ on the left side.       Patellar reflexes are 1+ on the right side and 1+ on the left side.       Achilles reflexes are 1+ on the right side and 1+ on the left side.  Psychiatric: Her speech is normal.   Vitals reviewed.        Assessment:        1. MCI (mild cognitive impairment) with memory loss    2. Attention and concentration deficit    3. Essential hypertension    4. Hyperlipidemia, unspecified hyperlipidemia type            Plan:       Discussed with patient and son regarding her mild memory difficulties.  The MRI scan of the brain was essentially normal for age and minimal changes noted related to her history of hypertension and hyperlipidemia.  Patient advised to make an appointment to see a primary care physician for better blood pressure control which is most likely related to compliance difficulty.  Son will now manage her medications and get her a pill box.  In addition he is advised to get a started in a day program that would help.  Will initiate Namenda 10 mg in the morning daily.  He will keep in touch with me via [email or web address].  Will follow up in 6 months..

## 2019-06-24 ENCOUNTER — LAB VISIT (OUTPATIENT)
Dept: LAB | Facility: HOSPITAL | Age: 69
End: 2019-06-24
Attending: INTERNAL MEDICINE
Payer: MEDICARE

## 2019-06-24 ENCOUNTER — OFFICE VISIT (OUTPATIENT)
Dept: INTERNAL MEDICINE | Facility: CLINIC | Age: 69
End: 2019-06-24
Payer: MEDICARE

## 2019-06-24 VITALS
HEART RATE: 53 BPM | BODY MASS INDEX: 24.07 KG/M2 | OXYGEN SATURATION: 99 % | RESPIRATION RATE: 15 BRPM | WEIGHT: 144.63 LBS | SYSTOLIC BLOOD PRESSURE: 136 MMHG | TEMPERATURE: 99 F | DIASTOLIC BLOOD PRESSURE: 64 MMHG

## 2019-06-24 DIAGNOSIS — M17.0 PRIMARY OSTEOARTHRITIS OF BOTH KNEES: Chronic | ICD-10-CM

## 2019-06-24 DIAGNOSIS — Z80.0 FH: COLON CANCER IN FIRST DEGREE RELATIVE <60 YEARS OLD: ICD-10-CM

## 2019-06-24 DIAGNOSIS — E78.5 HYPERLIPIDEMIA, UNSPECIFIED HYPERLIPIDEMIA TYPE: ICD-10-CM

## 2019-06-24 DIAGNOSIS — Z00.00 ROUTINE GENERAL MEDICAL EXAMINATION AT A HEALTH CARE FACILITY: ICD-10-CM

## 2019-06-24 DIAGNOSIS — D53.9 NUTRITIONAL ANEMIA: ICD-10-CM

## 2019-06-24 DIAGNOSIS — R63.4 WEIGHT LOSS: ICD-10-CM

## 2019-06-24 DIAGNOSIS — R21 RASH: ICD-10-CM

## 2019-06-24 DIAGNOSIS — Z12.31 ENCOUNTER FOR SCREENING MAMMOGRAM FOR BREAST CANCER: ICD-10-CM

## 2019-06-24 DIAGNOSIS — I10 ESSENTIAL HYPERTENSION: Chronic | ICD-10-CM

## 2019-06-24 DIAGNOSIS — G31.84 MCI (MILD COGNITIVE IMPAIRMENT) WITH MEMORY LOSS: ICD-10-CM

## 2019-06-24 DIAGNOSIS — Z12.11 ENCOUNTER FOR SCREENING COLONOSCOPY: ICD-10-CM

## 2019-06-24 DIAGNOSIS — E56.9 VITAMIN DEFICIENCY: ICD-10-CM

## 2019-06-24 DIAGNOSIS — D50.8 OTHER IRON DEFICIENCY ANEMIA: ICD-10-CM

## 2019-06-24 DIAGNOSIS — I10 ESSENTIAL HYPERTENSION: Primary | Chronic | ICD-10-CM

## 2019-06-24 LAB
ALBUMIN SERPL BCP-MCNC: 3.8 G/DL (ref 3.5–5.2)
ALP SERPL-CCNC: 68 U/L (ref 55–135)
ALT SERPL W/O P-5'-P-CCNC: 20 U/L (ref 10–44)
ANION GAP SERPL CALC-SCNC: 6 MMOL/L (ref 8–16)
AST SERPL-CCNC: 19 U/L (ref 10–40)
BASOPHILS # BLD AUTO: 0.03 K/UL (ref 0–0.2)
BASOPHILS NFR BLD: 0.7 % (ref 0–1.9)
BILIRUB SERPL-MCNC: 0.3 MG/DL (ref 0.1–1)
BUN SERPL-MCNC: 16 MG/DL (ref 8–23)
CALCIUM SERPL-MCNC: 9.9 MG/DL (ref 8.7–10.5)
CHLORIDE SERPL-SCNC: 109 MMOL/L (ref 95–110)
CHOLEST SERPL-MCNC: 190 MG/DL (ref 120–199)
CHOLEST/HDLC SERPL: 3 {RATIO} (ref 2–5)
CO2 SERPL-SCNC: 27 MMOL/L (ref 23–29)
CREAT SERPL-MCNC: 0.9 MG/DL (ref 0.5–1.4)
DIFFERENTIAL METHOD: ABNORMAL
EOSINOPHIL # BLD AUTO: 0 K/UL (ref 0–0.5)
EOSINOPHIL NFR BLD: 0.7 % (ref 0–8)
ERYTHROCYTE [DISTWIDTH] IN BLOOD BY AUTOMATED COUNT: 13.9 % (ref 11.5–14.5)
EST. GFR  (AFRICAN AMERICAN): >60 ML/MIN/1.73 M^2
EST. GFR  (NON AFRICAN AMERICAN): >60 ML/MIN/1.73 M^2
GLUCOSE SERPL-MCNC: 98 MG/DL (ref 70–110)
HCT VFR BLD AUTO: 37.3 % (ref 37–48.5)
HDLC SERPL-MCNC: 63 MG/DL (ref 40–75)
HDLC SERPL: 33.2 % (ref 20–50)
HGB BLD-MCNC: 11.6 G/DL (ref 12–16)
IMM GRANULOCYTES # BLD AUTO: 0 K/UL (ref 0–0.04)
IMM GRANULOCYTES NFR BLD AUTO: 0 % (ref 0–0.5)
LDLC SERPL CALC-MCNC: 113.8 MG/DL (ref 63–159)
LYMPHOCYTES # BLD AUTO: 1.5 K/UL (ref 1–4.8)
LYMPHOCYTES NFR BLD: 34.8 % (ref 18–48)
MCH RBC QN AUTO: 28.2 PG (ref 27–31)
MCHC RBC AUTO-ENTMCNC: 31.1 G/DL (ref 32–36)
MCV RBC AUTO: 91 FL (ref 82–98)
MONOCYTES # BLD AUTO: 0.5 K/UL (ref 0.3–1)
MONOCYTES NFR BLD: 10.8 % (ref 4–15)
NEUTROPHILS # BLD AUTO: 2.2 K/UL (ref 1.8–7.7)
NEUTROPHILS NFR BLD: 53 % (ref 38–73)
NONHDLC SERPL-MCNC: 127 MG/DL
NRBC BLD-RTO: 0 /100 WBC
PLATELET # BLD AUTO: 352 K/UL (ref 150–350)
PMV BLD AUTO: 10.3 FL (ref 9.2–12.9)
POTASSIUM SERPL-SCNC: 4.2 MMOL/L (ref 3.5–5.1)
PROT SERPL-MCNC: 7.5 G/DL (ref 6–8.4)
RBC # BLD AUTO: 4.11 M/UL (ref 4–5.4)
SODIUM SERPL-SCNC: 142 MMOL/L (ref 136–145)
TRIGL SERPL-MCNC: 66 MG/DL (ref 30–150)
TSH SERPL DL<=0.005 MIU/L-ACNC: 1.3 UIU/ML (ref 0.4–4)
VIT B12 SERPL-MCNC: 1181 PG/ML (ref 210–950)
WBC # BLD AUTO: 4.17 K/UL (ref 3.9–12.7)

## 2019-06-24 PROCEDURE — 99397 PR PREVENTIVE VISIT,EST,65 & OVER: ICD-10-PCS | Mod: 25,S$GLB,, | Performed by: INTERNAL MEDICINE

## 2019-06-24 PROCEDURE — 85025 COMPLETE CBC W/AUTO DIFF WBC: CPT

## 2019-06-24 PROCEDURE — 84443 ASSAY THYROID STIM HORMONE: CPT

## 2019-06-24 PROCEDURE — 80053 COMPREHEN METABOLIC PANEL: CPT

## 2019-06-24 PROCEDURE — 82607 VITAMIN B-12: CPT

## 2019-06-24 PROCEDURE — G0009 PNEUMOCOCCAL POLYSACCHARIDE VACCINE 23-VALENT =>2YO SQ IM: ICD-10-PCS | Mod: S$GLB,,, | Performed by: INTERNAL MEDICINE

## 2019-06-24 PROCEDURE — 3075F PR MOST RECENT SYSTOLIC BLOOD PRESS GE 130-139MM HG: ICD-10-PCS | Mod: CPTII,S$GLB,, | Performed by: INTERNAL MEDICINE

## 2019-06-24 PROCEDURE — 3075F SYST BP GE 130 - 139MM HG: CPT | Mod: CPTII,S$GLB,, | Performed by: INTERNAL MEDICINE

## 2019-06-24 PROCEDURE — 90732 PNEUMOCOCCAL POLYSACCHARIDE VACCINE 23-VALENT =>2YO SQ IM: ICD-10-PCS | Mod: S$GLB,,, | Performed by: INTERNAL MEDICINE

## 2019-06-24 PROCEDURE — G0009 ADMIN PNEUMOCOCCAL VACCINE: HCPCS | Mod: S$GLB,,, | Performed by: INTERNAL MEDICINE

## 2019-06-24 PROCEDURE — 99999 PR PBB SHADOW E&M-EST. PATIENT-LVL III: CPT | Mod: PBBFAC,,, | Performed by: INTERNAL MEDICINE

## 2019-06-24 PROCEDURE — 90732 PPSV23 VACC 2 YRS+ SUBQ/IM: CPT | Mod: S$GLB,,, | Performed by: INTERNAL MEDICINE

## 2019-06-24 PROCEDURE — 36415 COLL VENOUS BLD VENIPUNCTURE: CPT | Mod: PO

## 2019-06-24 PROCEDURE — 80061 LIPID PANEL: CPT

## 2019-06-24 PROCEDURE — 99999 PR PBB SHADOW E&M-EST. PATIENT-LVL III: ICD-10-PCS | Mod: PBBFAC,,, | Performed by: INTERNAL MEDICINE

## 2019-06-24 PROCEDURE — 3078F PR MOST RECENT DIASTOLIC BLOOD PRESSURE < 80 MM HG: ICD-10-PCS | Mod: CPTII,S$GLB,, | Performed by: INTERNAL MEDICINE

## 2019-06-24 PROCEDURE — 99397 PER PM REEVAL EST PAT 65+ YR: CPT | Mod: 25,S$GLB,, | Performed by: INTERNAL MEDICINE

## 2019-06-24 PROCEDURE — 3078F DIAST BP <80 MM HG: CPT | Mod: CPTII,S$GLB,, | Performed by: INTERNAL MEDICINE

## 2019-06-24 RX ORDER — LOSARTAN POTASSIUM 50 MG/1
TABLET ORAL
Qty: 90 TABLET | Refills: 3 | Status: SHIPPED | OUTPATIENT
Start: 2019-06-24 | End: 2020-03-31

## 2019-06-24 RX ORDER — HYDROCORTISONE 25 MG/G
CREAM TOPICAL 3 TIMES DAILY
Qty: 45 G | Refills: 0 | Status: SHIPPED | OUTPATIENT
Start: 2019-06-24

## 2019-06-24 RX ORDER — AMLODIPINE BESYLATE 5 MG/1
TABLET ORAL
Qty: 90 TABLET | Refills: 3 | Status: SHIPPED | OUTPATIENT
Start: 2019-06-24 | End: 2020-03-31 | Stop reason: SDUPTHER

## 2019-06-24 RX ORDER — AZELASTINE 1 MG/ML
2 SPRAY, METERED NASAL 2 TIMES DAILY PRN
Qty: 30 ML | Refills: 6 | Status: SHIPPED | OUTPATIENT
Start: 2019-06-24 | End: 2020-03-31

## 2019-06-24 RX ORDER — MEMANTINE HYDROCHLORIDE 10 MG/1
10 TABLET ORAL EVERY MORNING
Qty: 90 TABLET | Refills: 3 | Status: SHIPPED | OUTPATIENT
Start: 2019-06-24 | End: 2020-03-31 | Stop reason: SDUPTHER

## 2019-07-03 NOTE — PROGRESS NOTES
Subjective:       Patient ID: Miranda Houston is a 68 y.o. female.    Chief Complaint: Hypertension    HPI   The patient presents for follow-up of hypertension therapy and other medical conditions.  She is accompanied by her son today who relates concerns about her progressive impairment of memory.  The patient is out of the med.  She is also out of her blood pressure medication.    The patient has been noted some posterior neck pain.  Chest a notes a rash on her posterior neck.  She denies having any of the joint pains except for knee joint pain. She reports her energy level is good.  She is averaging 7-8 hours of sleep at night.  She is not experiencing any headaches or dizziness.    Other active medical conditions include hypertension, hyperlipidemia, osteoarthritis, mild cognitive impairment.    No interval change in past medical history, family history, or social history since prior evaluations.    Immunizations were reviewed.  The patient is due for Pneumovax.    Screening exam were reviewed.  The patient is due for screening colonoscopy and a screening mammogram.      Review of Systems   Constitutional: Negative for fatigue, fever and unexpected weight change.   HENT: Negative for congestion, postnasal drip, rhinorrhea and sore throat.    Eyes: Negative for visual disturbance.   Respiratory: Negative for cough, chest tightness, shortness of breath and wheezing.    Cardiovascular: Negative for chest pain, palpitations and leg swelling.   Gastrointestinal: Negative for abdominal pain and blood in stool.   Genitourinary: Negative for dysuria, frequency and hematuria.   Musculoskeletal: Positive for arthralgias, joint swelling and neck pain. Negative for back pain and myalgias.   Skin: Positive for rash.   Neurological: Negative for dizziness, syncope, weakness, numbness and headaches.   Psychiatric/Behavioral: Negative for sleep disturbance. The patient is not nervous/anxious.        Objective:      Physical  Exam   Constitutional: She is oriented to person, place, and time. Vital signs are normal. She appears well-developed and well-nourished. No distress.   The patient has lost 20 lb since 01/03/2019.   HENT:   Head: Normocephalic and atraumatic.   Right Ear: External ear normal.   Left Ear: External ear normal.   Nose: Nose normal.   Mouth/Throat: Oropharynx is clear and moist. No oropharyngeal exudate.   Eyes: Pupils are equal, round, and reactive to light. Conjunctivae and EOM are normal. No scleral icterus.   Neck: Normal range of motion. Neck supple. No JVD present. Carotid bruit is not present. No thyromegaly present.   Cardiovascular: Normal rate, regular rhythm, normal heart sounds, intact distal pulses and normal pulses. Exam reveals no gallop and no friction rub.   No murmur heard.  Pulmonary/Chest: Effort normal and breath sounds normal. No respiratory distress. She has no wheezes. She has no rales.   Abdominal: Soft. Bowel sounds are normal. She exhibits no abdominal bruit and no mass. There is no splenomegaly or hepatomegaly. There is no tenderness. No hernia.   Musculoskeletal: Normal range of motion. She exhibits no edema or tenderness.        Right shoulder: She exhibits no effusion and no deformity.   Lymphadenopathy:     She has no cervical adenopathy.     She has no axillary adenopathy.        Right: No supraclavicular adenopathy present.        Left: No supraclavicular adenopathy present.   Neurological: She is alert and oriented to person, place, and time. She has normal strength. No cranial nerve deficit.   Skin: Skin is warm and dry. Rash noted.   A scaly eruption is noted on the posterior neck.   Psychiatric: She has a normal mood and affect. Her speech is normal and behavior is normal.   Nursing note and vitals reviewed.      Assessment:       1. Essential hypertension    2. MCI (mild cognitive impairment) with memory loss    3. Hyperlipidemia, unspecified hyperlipidemia type    4. Primary  osteoarthritis of both knees    5. Rash    6. Encounter for screening colonoscopy    7. FH: colon cancer in first degree relative <60 years old    8. Encounter for screening mammogram for breast cancer    9. Routine general medical examination at a health care facility    10. Other iron deficiency anemia    11. Vitamin deficiency    12. Weight loss    13. Nutritional anemia         Plan:       Miranda was seen today for hypertension.  Pneumovax will be administered today.  A screening mammogram will be ordered.  Screening colonoscopy will also be requested.  The patient should resume her blood pressure medication.  The patient should resume Namenda for treatment for mild cognitive impairment.  Hydrocortisone cream will be ordered for the neck rash.  Additional blood tests will be obtained today.  Urine tests will be obtained today.  The patient is to return to clinic in 1 month.    Diagnoses and all orders for this visit:    Essential hypertension  -     losartan (COZAAR) 50 MG tablet; TAKE 1 TABLET ONE TIME DAILY  -     memantine (NAMENDA) 10 MG Tab; Take 1 tablet (10 mg total) by mouth every morning.  -     CBC auto differential; Future  -     TSH; Future  -     Urinalysis; Future    MCI (mild cognitive impairment) with memory loss  -     memantine (NAMENDA) 10 MG Tab; Take 1 tablet (10 mg total) by mouth every morning.    Hyperlipidemia, unspecified hyperlipidemia type  -     Comprehensive metabolic panel; Future  -     Lipid panel; Future    Primary osteoarthritis of both knees    Rash    Encounter for screening colonoscopy  -     Case request GI: COLONOSCOPY    FH: colon cancer in first degree relative <60 years old  -     Case request GI: COLONOSCOPY    Encounter for screening mammogram for breast cancer  -     Mammo Digital Screening Bilateral With CAD; Future    Routine general medical examination at a health care facility  -     Urine culture; Future    Other iron deficiency anemia    Vitamin deficiency  -      Vitamin B12; Future    Weight loss  -     Vitamin B12; Future    Nutritional anemia   -     Vitamin B12; Future    Other orders  -     amLODIPine (NORVASC) 5 MG tablet; TAKE 1 TABLET ONE TIME DAILY  -     azelastine (ASTELIN) 137 mcg (0.1 %) nasal spray; 2 sprays (274 mcg total) by Nasal route 2 (two) times daily as needed for Rhinitis.  -     hydrocortisone 2.5 % cream; Apply topically 3 (three) times daily.  -     (In Office Administered) Pneumococcal Polysaccharide Vaccine (23 Valent) (SQ/IM)

## 2019-07-05 ENCOUNTER — TELEPHONE (OUTPATIENT)
Dept: OPHTHALMOLOGY | Facility: CLINIC | Age: 69
End: 2019-07-05

## 2019-07-05 NOTE — TELEPHONE ENCOUNTER
Spoke with patient. Informed her of Dr. Hsieh's passing. And, that someone will be contacting her to reschedule her appointment. TR 7/5/19

## 2020-03-11 ENCOUNTER — TELEPHONE (OUTPATIENT)
Dept: INTERNAL MEDICINE | Facility: CLINIC | Age: 70
End: 2020-03-11

## 2020-03-11 NOTE — TELEPHONE ENCOUNTER
Patient's last office visit was in June 2019.  A follow-up office visit is recommended for re-evaluation.

## 2020-03-11 NOTE — TELEPHONE ENCOUNTER
----- Message from Francis Victoria sent at 3/11/2020  1:02 PM CDT -----  Contact: Ulises Shetty [phone]  Would like to get medical advice.  Symptoms (please be specific):  dementia  How long has patient had these symptoms:  Few year   Pharmacy name and phone #:    Any drug allergies (copy from chart):      Would the patient rather a call back or a response via [facility]?:  Call back   Comments:

## 2020-03-31 ENCOUNTER — LAB VISIT (OUTPATIENT)
Dept: LAB | Facility: HOSPITAL | Age: 70
End: 2020-03-31
Attending: INTERNAL MEDICINE
Payer: MEDICARE

## 2020-03-31 ENCOUNTER — OFFICE VISIT (OUTPATIENT)
Dept: INTERNAL MEDICINE | Facility: CLINIC | Age: 70
End: 2020-03-31
Payer: MEDICARE

## 2020-03-31 VITALS
DIASTOLIC BLOOD PRESSURE: 78 MMHG | HEIGHT: 65 IN | TEMPERATURE: 97 F | HEART RATE: 71 BPM | BODY MASS INDEX: 23.25 KG/M2 | RESPIRATION RATE: 16 BRPM | SYSTOLIC BLOOD PRESSURE: 161 MMHG | WEIGHT: 139.56 LBS

## 2020-03-31 DIAGNOSIS — I10 ESSENTIAL HYPERTENSION: ICD-10-CM

## 2020-03-31 DIAGNOSIS — F03.91 DEMENTIA WITH BEHAVIORAL DISTURBANCE, UNSPECIFIED DEMENTIA TYPE: ICD-10-CM

## 2020-03-31 DIAGNOSIS — E78.5 HYPERLIPIDEMIA, UNSPECIFIED HYPERLIPIDEMIA TYPE: ICD-10-CM

## 2020-03-31 DIAGNOSIS — F03.91 DEMENTIA WITH BEHAVIORAL DISTURBANCE, UNSPECIFIED DEMENTIA TYPE: Primary | ICD-10-CM

## 2020-03-31 LAB
ALBUMIN SERPL BCP-MCNC: 4 G/DL (ref 3.5–5.2)
ALP SERPL-CCNC: 60 U/L (ref 55–135)
ALT SERPL W/O P-5'-P-CCNC: 35 U/L (ref 10–44)
ANION GAP SERPL CALC-SCNC: 6 MMOL/L (ref 8–16)
AST SERPL-CCNC: 24 U/L (ref 10–40)
BASOPHILS # BLD AUTO: 0.03 K/UL (ref 0–0.2)
BASOPHILS NFR BLD: 0.6 % (ref 0–1.9)
BILIRUB SERPL-MCNC: 0.2 MG/DL (ref 0.1–1)
BUN SERPL-MCNC: 19 MG/DL (ref 8–23)
CALCIUM SERPL-MCNC: 9.1 MG/DL (ref 8.7–10.5)
CHLORIDE SERPL-SCNC: 103 MMOL/L (ref 95–110)
CHOLEST SERPL-MCNC: 206 MG/DL (ref 120–199)
CHOLEST/HDLC SERPL: 2.6 {RATIO} (ref 2–5)
CO2 SERPL-SCNC: 29 MMOL/L (ref 23–29)
CREAT SERPL-MCNC: 1 MG/DL (ref 0.5–1.4)
DIFFERENTIAL METHOD: ABNORMAL
EOSINOPHIL # BLD AUTO: 0 K/UL (ref 0–0.5)
EOSINOPHIL NFR BLD: 0.6 % (ref 0–8)
ERYTHROCYTE [DISTWIDTH] IN BLOOD BY AUTOMATED COUNT: 13.9 % (ref 11.5–14.5)
EST. GFR  (AFRICAN AMERICAN): >60 ML/MIN/1.73 M^2
EST. GFR  (NON AFRICAN AMERICAN): 57.6 ML/MIN/1.73 M^2
GLUCOSE SERPL-MCNC: 85 MG/DL (ref 70–110)
HCT VFR BLD AUTO: 39.2 % (ref 37–48.5)
HDLC SERPL-MCNC: 78 MG/DL (ref 40–75)
HDLC SERPL: 37.9 % (ref 20–50)
HGB BLD-MCNC: 11.7 G/DL (ref 12–16)
IMM GRANULOCYTES # BLD AUTO: 0.01 K/UL (ref 0–0.04)
IMM GRANULOCYTES NFR BLD AUTO: 0.2 % (ref 0–0.5)
LDLC SERPL CALC-MCNC: 114.6 MG/DL (ref 63–159)
LYMPHOCYTES # BLD AUTO: 2.1 K/UL (ref 1–4.8)
LYMPHOCYTES NFR BLD: 39.8 % (ref 18–48)
MCH RBC QN AUTO: 29.3 PG (ref 27–31)
MCHC RBC AUTO-ENTMCNC: 29.8 G/DL (ref 32–36)
MCV RBC AUTO: 98 FL (ref 82–98)
MONOCYTES # BLD AUTO: 0.6 K/UL (ref 0.3–1)
MONOCYTES NFR BLD: 11.1 % (ref 4–15)
NEUTROPHILS # BLD AUTO: 2.5 K/UL (ref 1.8–7.7)
NEUTROPHILS NFR BLD: 47.7 % (ref 38–73)
NONHDLC SERPL-MCNC: 128 MG/DL
NRBC BLD-RTO: 0 /100 WBC
PLATELET # BLD AUTO: 301 K/UL (ref 150–350)
PMV BLD AUTO: 10.5 FL (ref 9.2–12.9)
POTASSIUM SERPL-SCNC: 5.1 MMOL/L (ref 3.5–5.1)
PROT SERPL-MCNC: 8 G/DL (ref 6–8.4)
RBC # BLD AUTO: 4 M/UL (ref 4–5.4)
SODIUM SERPL-SCNC: 138 MMOL/L (ref 136–145)
TRIGL SERPL-MCNC: 67 MG/DL (ref 30–150)
TSH SERPL DL<=0.005 MIU/L-ACNC: 2.65 UIU/ML (ref 0.4–4)
WBC # BLD AUTO: 5.32 K/UL (ref 3.9–12.7)

## 2020-03-31 PROCEDURE — 1101F PT FALLS ASSESS-DOCD LE1/YR: CPT | Mod: CPTII,S$GLB,, | Performed by: INTERNAL MEDICINE

## 2020-03-31 PROCEDURE — 1159F PR MEDICATION LIST DOCUMENTED IN MEDICAL RECORD: ICD-10-PCS | Mod: S$GLB,,, | Performed by: INTERNAL MEDICINE

## 2020-03-31 PROCEDURE — 80061 LIPID PANEL: CPT

## 2020-03-31 PROCEDURE — 80053 COMPREHEN METABOLIC PANEL: CPT

## 2020-03-31 PROCEDURE — 99214 PR OFFICE/OUTPT VISIT, EST, LEVL IV, 30-39 MIN: ICD-10-PCS | Mod: S$GLB,,, | Performed by: INTERNAL MEDICINE

## 2020-03-31 PROCEDURE — 3078F PR MOST RECENT DIASTOLIC BLOOD PRESSURE < 80 MM HG: ICD-10-PCS | Mod: CPTII,S$GLB,, | Performed by: INTERNAL MEDICINE

## 2020-03-31 PROCEDURE — 99999 PR PBB SHADOW E&M-EST. PATIENT-LVL III: CPT | Mod: PBBFAC,,, | Performed by: INTERNAL MEDICINE

## 2020-03-31 PROCEDURE — 84443 ASSAY THYROID STIM HORMONE: CPT

## 2020-03-31 PROCEDURE — 85025 COMPLETE CBC W/AUTO DIFF WBC: CPT

## 2020-03-31 PROCEDURE — 3078F DIAST BP <80 MM HG: CPT | Mod: CPTII,S$GLB,, | Performed by: INTERNAL MEDICINE

## 2020-03-31 PROCEDURE — 99999 PR PBB SHADOW E&M-EST. PATIENT-LVL III: ICD-10-PCS | Mod: PBBFAC,,, | Performed by: INTERNAL MEDICINE

## 2020-03-31 PROCEDURE — 1101F PR PT FALLS ASSESS DOC 0-1 FALLS W/OUT INJ PAST YR: ICD-10-PCS | Mod: CPTII,S$GLB,, | Performed by: INTERNAL MEDICINE

## 2020-03-31 PROCEDURE — 1126F PR PAIN SEVERITY QUANTIFIED, NO PAIN PRESENT: ICD-10-PCS | Mod: S$GLB,,, | Performed by: INTERNAL MEDICINE

## 2020-03-31 PROCEDURE — 99214 OFFICE O/P EST MOD 30 MIN: CPT | Mod: S$GLB,,, | Performed by: INTERNAL MEDICINE

## 2020-03-31 PROCEDURE — 3077F SYST BP >= 140 MM HG: CPT | Mod: CPTII,S$GLB,, | Performed by: INTERNAL MEDICINE

## 2020-03-31 PROCEDURE — 1159F MED LIST DOCD IN RCRD: CPT | Mod: S$GLB,,, | Performed by: INTERNAL MEDICINE

## 2020-03-31 PROCEDURE — 1126F AMNT PAIN NOTED NONE PRSNT: CPT | Mod: S$GLB,,, | Performed by: INTERNAL MEDICINE

## 2020-03-31 PROCEDURE — 3077F PR MOST RECENT SYSTOLIC BLOOD PRESSURE >= 140 MM HG: ICD-10-PCS | Mod: CPTII,S$GLB,, | Performed by: INTERNAL MEDICINE

## 2020-03-31 PROCEDURE — 36415 COLL VENOUS BLD VENIPUNCTURE: CPT | Mod: PO

## 2020-03-31 RX ORDER — QUETIAPINE FUMARATE 50 MG/1
50 TABLET, FILM COATED ORAL NIGHTLY
Qty: 30 TABLET | Refills: 4 | Status: SHIPPED | OUTPATIENT
Start: 2020-03-31 | End: 2020-04-26

## 2020-03-31 RX ORDER — AMLODIPINE BESYLATE 5 MG/1
TABLET ORAL
Qty: 90 TABLET | Refills: 3 | Status: ON HOLD | OUTPATIENT
Start: 2020-03-31 | End: 2023-04-26 | Stop reason: HOSPADM

## 2020-03-31 RX ORDER — MEMANTINE HYDROCHLORIDE 10 MG/1
10 TABLET ORAL DAILY
Qty: 90 TABLET | Refills: 3 | Status: SHIPPED | OUTPATIENT
Start: 2020-03-31 | End: 2020-06-01

## 2020-03-31 NOTE — PROGRESS NOTES
Subjective:       Patient ID: Miranda Houston is a 69 y.o. female.    Chief Complaint: dementia    HPI   The patient presents for follow-up of symptoms of dementia in addition to other medical conditions.  She is accompanied today by her daughter-in-law.  She and her  are her caregivers at home.  They have noticed that the patient is be- coming more forgetful.  She has wandered away from home.  She always is trying to escape her home.  She tries to climb out of the window.  She has broken fence panels in the backyard to get out.  She has become neglectful of her personal hygiene.  She is unable to prepare her own meals.  She asks the same questions repetitively.  She often thinks her parents are still living even though they have been  for many years.  Emotional lability is also described.  Her disposition can go from being nice to being mean-spirited in an instant especially to her grown children.    She is out of her blood pressure medication.  Her appetite is generally good.  Her sleep is erratic at night.  She has not experienced any falls or loss of balance.  She denies having any headaches.    Review of Systems   Constitutional: Positive for activity change. Negative for fatigue, fever and unexpected weight change.   HENT: Positive for congestion, postnasal drip and rhinorrhea. Negative for sore throat.    Eyes: Negative for visual disturbance.   Respiratory: Negative for cough, chest tightness, shortness of breath and wheezing.    Cardiovascular: Negative for chest pain, palpitations and leg swelling.   Gastrointestinal: Negative for abdominal pain and blood in stool.   Genitourinary: Negative for dysuria, frequency and hematuria.   Musculoskeletal: Negative for arthralgias, back pain, joint swelling and myalgias.   Skin: Negative for rash.   Neurological: Negative for dizziness, syncope, weakness, numbness and headaches.   Psychiatric/Behavioral: Positive for agitation, behavioral problems,  confusion and sleep disturbance. Negative for self-injury and suicidal ideas. The patient is not nervous/anxious.        Objective:      Physical Exam   Constitutional: Vital signs are normal. She appears well-developed and well-nourished. No distress.   HENT:   Head: Normocephalic and atraumatic.   Right Ear: External ear normal.   Left Ear: External ear normal.   Nose: Nose normal.   Mouth/Throat: Oropharynx is clear and moist. No oropharyngeal exudate.   Eyes: Pupils are equal, round, and reactive to light. Conjunctivae and EOM are normal. No scleral icterus.   Neck: Normal range of motion. Neck supple. No JVD present. Carotid bruit is not present. No thyromegaly present.   Cardiovascular: Normal rate, regular rhythm, normal heart sounds, intact distal pulses and normal pulses. Exam reveals no gallop and no friction rub.   No murmur heard.  Pulmonary/Chest: Effort normal and breath sounds normal. No respiratory distress. She has no wheezes. She has no rales.   Abdominal: Soft. Bowel sounds are normal. She exhibits no abdominal bruit and no mass. There is no splenomegaly or hepatomegaly. There is no tenderness. No hernia.   Musculoskeletal: Normal range of motion. She exhibits no edema or tenderness.        Right shoulder: She exhibits no effusion and no deformity.   Lymphadenopathy:     She has no cervical adenopathy.     She has no axillary adenopathy.        Right: No supraclavicular adenopathy present.        Left: No supraclavicular adenopathy present.   Neurological: She is alert. She has normal strength. No cranial nerve deficit.   The patient is pleasant today.  She is cooperative.  Her memory is poor.  She cannot remember who the president or state governor is.  She does not recall the month or year.  No focal motor deficits are evident.  Her gait is grossly normal.   Skin: Skin is warm and dry. No rash noted.   Psychiatric: She has a normal mood and affect. Her speech is normal and behavior is normal.    Insight into her illness is poor.  Judgment is poor.  No current hallucinosis is present.   Nursing note and vitals reviewed.      Assessment:       1. Dementia with behavioral disturbance, unspecified dementia type    2. Essential hypertension    3. Hyperlipidemia, unspecified hyperlipidemia type        Plan:     Miranda was seen today for dementia.  Namenda will be reordered.  Seroquel will be ordered at bedtime.  Amlodipine will be resumed for control of blood pressure.  Neurology consultation is already scheduled for further evaluation.  Updated lab tests will be obtained today.    Diagnoses and all orders for this visit:    Dementia with behavioral disturbance, unspecified dementia type  -     memantine (NAMENDA) 10 MG Tab; Take 1 tablet (10 mg total) by mouth once daily. For memory  -     Comprehensive metabolic panel; Future  -     CBC auto differential; Future  -     Lipid panel; Future  -     TSH; Future  -     Urinalysis; Future    Essential hypertension  -     Comprehensive metabolic panel; Future  -     CBC auto differential; Future  -     Lipid panel; Future  -     TSH; Future  -     Urinalysis; Future    Hyperlipidemia, unspecified hyperlipidemia type  -     Comprehensive metabolic panel; Future  -     CBC auto differential; Future  -     Lipid panel; Future  -     TSH; Future  -     Urinalysis; Future    Other orders  -     QUEtiapine (SEROQUEL) 50 MG tablet; Take 1 tablet (50 mg total) by mouth nightly.  -     amLODIPine (NORVASC) 5 MG tablet; TAKE 1 TABLET ONE TIME DAILY FOR BLOOD PRESSURE

## 2020-04-03 ENCOUNTER — HOSPITAL ENCOUNTER (EMERGENCY)
Facility: HOSPITAL | Age: 70
Discharge: PSYCHIATRIC OR SUBSTANCE USE FACILITY | End: 2020-04-04
Attending: EMERGENCY MEDICINE
Payer: MEDICARE

## 2020-04-03 ENCOUNTER — TELEPHONE (OUTPATIENT)
Dept: NEUROLOGY | Facility: CLINIC | Age: 70
End: 2020-04-03

## 2020-04-03 ENCOUNTER — PATIENT MESSAGE (OUTPATIENT)
Dept: INTERNAL MEDICINE | Facility: CLINIC | Age: 70
End: 2020-04-03

## 2020-04-03 DIAGNOSIS — F29 PSYCHOSIS, UNSPECIFIED PSYCHOSIS TYPE: ICD-10-CM

## 2020-04-03 DIAGNOSIS — Z00.8 ENCOUNTER FOR PSYCHOLOGICAL EVALUATION: ICD-10-CM

## 2020-04-03 DIAGNOSIS — F03.91 DEMENTIA WITH BEHAVIORAL DISTURBANCE, UNSPECIFIED DEMENTIA TYPE: Primary | ICD-10-CM

## 2020-04-03 LAB
ALBUMIN SERPL BCP-MCNC: 4.8 G/DL (ref 3.5–5.2)
ALP SERPL-CCNC: 52 U/L (ref 38–126)
ALT SERPL W/O P-5'-P-CCNC: 36 U/L (ref 10–44)
AMPHET+METHAMPHET UR QL: NEGATIVE
ANION GAP SERPL CALC-SCNC: 10 MMOL/L (ref 8–16)
APAP SERPL-MCNC: <10 UG/ML (ref 10–20)
AST SERPL-CCNC: 25 U/L (ref 15–46)
BARBITURATES UR QL SCN>200 NG/ML: NEGATIVE
BASOPHILS # BLD AUTO: 0.03 K/UL (ref 0–0.2)
BASOPHILS NFR BLD: 0.6 % (ref 0–1.9)
BENZODIAZ UR QL SCN>200 NG/ML: NEGATIVE
BILIRUB SERPL-MCNC: 0.4 MG/DL (ref 0.1–1)
BILIRUB UR QL STRIP: NEGATIVE
BUN SERPL-MCNC: 25 MG/DL (ref 7–17)
BZE UR QL SCN: NEGATIVE
CALCIUM SERPL-MCNC: 9.3 MG/DL (ref 8.7–10.5)
CANNABINOIDS UR QL SCN: NEGATIVE
CHLORIDE SERPL-SCNC: 104 MMOL/L (ref 95–110)
CLARITY UR REFRACT.AUTO: CLEAR
CO2 SERPL-SCNC: 26 MMOL/L (ref 23–29)
COLOR UR AUTO: ABNORMAL
CREAT SERPL-MCNC: 0.84 MG/DL (ref 0.5–1.4)
CREAT UR-MCNC: 36 MG/DL (ref 15–325)
DIFFERENTIAL METHOD: ABNORMAL
EOSINOPHIL # BLD AUTO: 0 K/UL (ref 0–0.5)
EOSINOPHIL NFR BLD: 0.4 % (ref 0–8)
ERYTHROCYTE [DISTWIDTH] IN BLOOD BY AUTOMATED COUNT: 13.6 % (ref 11.5–14.5)
EST. GFR  (AFRICAN AMERICAN): >60 ML/MIN/1.73 M^2
EST. GFR  (NON AFRICAN AMERICAN): >60 ML/MIN/1.73 M^2
ETHANOL SERPL-MCNC: <10 MG/DL
GLUCOSE SERPL-MCNC: 102 MG/DL (ref 70–110)
GLUCOSE UR QL STRIP: NEGATIVE
HCT VFR BLD AUTO: 37.6 % (ref 37–48.5)
HGB BLD-MCNC: 11.7 G/DL (ref 12–16)
HGB UR QL STRIP: ABNORMAL
IMM GRANULOCYTES # BLD AUTO: 0.01 K/UL (ref 0–0.04)
IMM GRANULOCYTES NFR BLD AUTO: 0.2 % (ref 0–0.5)
KETONES UR QL STRIP: NEGATIVE
LEUKOCYTE ESTERASE UR QL STRIP: NEGATIVE
LYMPHOCYTES # BLD AUTO: 1.8 K/UL (ref 1–4.8)
LYMPHOCYTES NFR BLD: 35.4 % (ref 18–48)
MCH RBC QN AUTO: 29 PG (ref 27–31)
MCHC RBC AUTO-ENTMCNC: 31.1 G/DL (ref 32–36)
MCV RBC AUTO: 93 FL (ref 82–98)
METHADONE UR QL SCN>300 NG/ML: NEGATIVE
MONOCYTES # BLD AUTO: 0.5 K/UL (ref 0.3–1)
MONOCYTES NFR BLD: 9.1 % (ref 4–15)
NEUTROPHILS # BLD AUTO: 2.7 K/UL (ref 1.8–7.7)
NEUTROPHILS NFR BLD: 54.3 % (ref 38–73)
NITRITE UR QL STRIP: NEGATIVE
NRBC BLD-RTO: 0 /100 WBC
OPIATES UR QL SCN: NEGATIVE
PCP UR QL SCN>25 NG/ML: NEGATIVE
PH UR STRIP: 7 [PH] (ref 5–8)
PLATELET # BLD AUTO: 302 K/UL (ref 150–350)
PMV BLD AUTO: 9.9 FL (ref 9.2–12.9)
POTASSIUM SERPL-SCNC: 4 MMOL/L (ref 3.5–5.1)
PROT SERPL-MCNC: 8.3 G/DL (ref 6–8.4)
PROT UR QL STRIP: NEGATIVE
RBC # BLD AUTO: 4.03 M/UL (ref 4–5.4)
SALICYLATES SERPL-MCNC: <5 MG/DL (ref 15–30)
SODIUM SERPL-SCNC: 140 MMOL/L (ref 136–145)
SP GR UR STRIP: 1.01 (ref 1–1.03)
TOXICOLOGY INFORMATION: NORMAL
TSH SERPL DL<=0.005 MIU/L-ACNC: 5.74 UIU/ML (ref 0.4–4)
URN SPEC COLLECT METH UR: ABNORMAL
UROBILINOGEN UR STRIP-ACNC: NEGATIVE EU/DL
WBC # BLD AUTO: 4.97 K/UL (ref 3.9–12.7)

## 2020-04-03 PROCEDURE — 85025 COMPLETE CBC W/AUTO DIFF WBC: CPT | Mod: ER

## 2020-04-03 PROCEDURE — 99285 EMERGENCY DEPT VISIT HI MDM: CPT | Mod: 25,ER

## 2020-04-03 PROCEDURE — 90791 PSYCH DIAGNOSTIC EVALUATION: CPT | Mod: GT,,, | Performed by: PSYCHIATRY & NEUROLOGY

## 2020-04-03 PROCEDURE — 96372 THER/PROPH/DIAG INJ SC/IM: CPT | Mod: ER

## 2020-04-03 PROCEDURE — 84443 ASSAY THYROID STIM HORMONE: CPT | Mod: ER

## 2020-04-03 PROCEDURE — 63600175 PHARM REV CODE 636 W HCPCS: Mod: ER | Performed by: EMERGENCY MEDICINE

## 2020-04-03 PROCEDURE — 93005 ELECTROCARDIOGRAM TRACING: CPT | Mod: ER

## 2020-04-03 PROCEDURE — 93010 ELECTROCARDIOGRAM REPORT: CPT | Mod: ,,, | Performed by: INTERNAL MEDICINE

## 2020-04-03 PROCEDURE — 93010 EKG 12-LEAD: ICD-10-PCS | Mod: ,,, | Performed by: INTERNAL MEDICINE

## 2020-04-03 PROCEDURE — 80307 DRUG TEST PRSMV CHEM ANLYZR: CPT | Mod: ER

## 2020-04-03 PROCEDURE — 80053 COMPREHEN METABOLIC PANEL: CPT | Mod: ER

## 2020-04-03 PROCEDURE — 80329 ANALGESICS NON-OPIOID 1 OR 2: CPT | Mod: ER

## 2020-04-03 PROCEDURE — 90791 PR PSYCHIATRIC DIAGNOSTIC EVALUATION: ICD-10-PCS | Mod: GT,,, | Performed by: PSYCHIATRY & NEUROLOGY

## 2020-04-03 PROCEDURE — 80320 DRUG SCREEN QUANTALCOHOLS: CPT | Mod: ER

## 2020-04-03 PROCEDURE — 81003 URINALYSIS AUTO W/O SCOPE: CPT | Mod: 59,ER

## 2020-04-03 PROCEDURE — 84439 ASSAY OF FREE THYROXINE: CPT

## 2020-04-03 RX ORDER — ZIPRASIDONE MESYLATE 20 MG/ML
10 INJECTION, POWDER, LYOPHILIZED, FOR SOLUTION INTRAMUSCULAR ONCE
Status: COMPLETED | OUTPATIENT
Start: 2020-04-03 | End: 2020-04-03

## 2020-04-03 RX ADMIN — ZIPRASIDONE MESYLATE 10 MG: 20 INJECTION, POWDER, LYOPHILIZED, FOR SOLUTION INTRAMUSCULAR at 07:04

## 2020-04-04 VITALS
BODY MASS INDEX: 23.25 KG/M2 | SYSTOLIC BLOOD PRESSURE: 184 MMHG | HEART RATE: 70 BPM | RESPIRATION RATE: 20 BRPM | TEMPERATURE: 98 F | HEIGHT: 65 IN | OXYGEN SATURATION: 100 % | DIASTOLIC BLOOD PRESSURE: 96 MMHG | WEIGHT: 139.56 LBS

## 2020-04-04 LAB — T4 FREE SERPL-MCNC: 0.73 NG/DL (ref 0.71–1.51)

## 2020-04-04 PROCEDURE — 25000003 PHARM REV CODE 250: Mod: ER | Performed by: EMERGENCY MEDICINE

## 2020-04-04 RX ORDER — AMLODIPINE BESYLATE 5 MG/1
5 TABLET ORAL
Status: COMPLETED | OUTPATIENT
Start: 2020-04-04 | End: 2020-04-04

## 2020-04-04 RX ADMIN — AMLODIPINE BESYLATE 5 MG: 5 TABLET ORAL at 12:04
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
## 2020-04-04 NOTE — ED PROVIDER NOTES
"Encounter Date: 4/3/2020       History     Chief Complaint   Patient presents with    Psychiatric Evaluation     Per pt son, "she has been acting erradict at home. trying to climb out of windows and run away. she has a history of dementia but it has not been this bad." Pt was suppose to start on seroquel; however, has not gotten prescription. Family concerned for pt safety. During triage, pt is contiually yelling at staff and trying to escape room.      Patient currently presents accompanied by her son with concern regarding behavioral disturbance.  Son describes that the patient has a longstanding history of dementia but that symptoms have gotten markedly worse over the past few days.  Patient has recently come to live with him due to some concerns regarding her health and safety but he notes that she is persistently agitated and getting worse.  He notes that she is trying to escape the house and has even attempted to climb out of the windows.  Patient describes that she is trying to get away from people who are trying to kill her.  Sign notes that the patient was due to start on Seroquel but never acquired the medication.        Review of patient's allergies indicates:   Allergen Reactions    No known drug allergies      Past Medical History:   Diagnosis Date    Chronic rhinitis     Colon polyps     Hyperlipidemia     Hypertension     Pyoderma gangrenosum     left leg    Steroid-induced diabetes mellitus      Past Surgical History:   Procedure Laterality Date     SECTION      2 surgeries.     Family History   Problem Relation Age of Onset    Hypertension Mother     Heart disease Mother     Cancer Mother         lung    Cancer Father 70        colon    Diabetes Sister     Hypertension Sister     Seizures Brother      Social History     Tobacco Use    Smoking status: Former Smoker     Packs/day: 0.25     Years: 7.00     Pack years: 1.75     Types: Cigarettes     Last attempt to quit: " "1997     Years since quittin.7    Smokeless tobacco: Never Used   Substance Use Topics    Alcohol use: Yes     Alcohol/week: 7.0 standard drinks     Types: 7 Glasses of wine per week    Drug use: No     Review of Systems   Unable to perform ROS: Psychiatric disorder       Physical Exam     Initial Vitals [20 1933]   BP Pulse Resp Temp SpO2   (!) 173/99 83 20 98 °F (36.7 °C) 100 %      MAP       --         Vitals:    20 1852 20 1933 20 2339 20 0006   BP:  (!) 173/99 (!) 178/86 (!) 174/92   Pulse:  83 77    Resp:  20 20    Temp:  98 °F (36.7 °C) 98.1 °F (36.7 °C)    TempSrc:  Oral Oral    SpO2:  100% 99%    Weight: 63.3 kg (139 lb 8.8 oz)      Height: 5' 5" (1.651 m)       20 0058   BP: (!) 184/96   Pulse: 70   Resp: 20   Temp: 98.2 °F (36.8 °C)   TempSrc: Oral   SpO2: 100%   Weight:    Height:          Physical Exam    Nursing note and vitals reviewed.  Constitutional: She appears well-developed and well-nourished. She is not diaphoretic. No distress.   HENT:   Head: Normocephalic and atraumatic.   Right Ear: External ear normal.   Left Ear: External ear normal.   Nose: Nose normal.   Mouth/Throat: Oropharynx is clear and moist.   Eyes: Conjunctivae and EOM are normal. Pupils are equal, round, and reactive to light. No scleral icterus.   Neck: Neck supple. No tracheal deviation present. No JVD present.   Cardiovascular: Normal rate, regular rhythm, normal heart sounds and intact distal pulses. Exam reveals no gallop and no friction rub.    No murmur heard.  Pulmonary/Chest: Breath sounds normal. No respiratory distress. She has no wheezes. She has no rhonchi. She has no rales.   Abdominal: Soft. Bowel sounds are normal. She exhibits no distension. There is no tenderness.   Musculoskeletal: Normal range of motion. She exhibits no edema.   Neurological: She is alert and oriented to person, place, and time. She has normal strength. No cranial nerve deficit or sensory " deficit.   Skin: Skin is warm and dry. No rash noted.   Psychiatric: Her mood appears anxious. Her speech is rapid and/or pressured. She is agitated. Thought content is paranoid and delusional. Cognition and memory are impaired. She expresses impulsivity. She expresses no homicidal and no suicidal ideation. She is inattentive.         ED Course   Procedures  Labs Reviewed   CBC W/ AUTO DIFFERENTIAL - Abnormal; Notable for the following components:       Result Value    Hemoglobin 11.7 (*)     Mean Corpuscular Hemoglobin Conc 31.1 (*)     All other components within normal limits   COMPREHENSIVE METABOLIC PANEL - Abnormal; Notable for the following components:    BUN, Bld 25 (*)     All other components within normal limits   SALICYLATE LEVEL - Abnormal; Notable for the following components:    Salicylate Lvl <5.0 (*)     All other components within normal limits   TSH - Abnormal; Notable for the following components:    TSH 5.740 (*)     All other components within normal limits   ACETAMINOPHEN LEVEL   DRUG SCREEN PANEL, URINE EMERGENCY   ALCOHOL,MEDICAL (ETHANOL)   T4, FREE     EKG Readings: (Independently Interpreted)   Initial Reading: No STEMI. Rhythm: Normal Sinus Rhythm. Heart Rate: 84. Ectopy: No Ectopy. Conduction: Normal. Axis: Normal.       Imaging Results    None          Medical Decision Making:   ED Management:  All historical, clinical, and laboratory findings were reviewed with the patient/family in detail along with the indications for transfer to an inpatient psychiatric services as we do not have those services available at this facility.  All remaining questions and concerns were addressed at that time and the son communicates understanding and agrees to proceed accordingly.      At this point the patient has been medically stabilized for transfer to inpatient psychiatry.   Jewel Caldwell MD    All pertinent details of the encounter were discussed with Dr Ge at [facility] via  Transfer Nurse Zoie who agrees to accept the patient in transfer based on the needs/patient preferences outlined above.  Patient will be transferred by SPD secondary to a need for secure/protective transport given the nature of the patients illness.                                 Medically cleared for psychiatry placement: 4/3/2020  9:09 PM    Clinical Impression:       ICD-10-CM ICD-9-CM   1. Dementia with behavioral disturbance, unspecified dementia type F03.91 294.21   2. Encounter for psychological evaluation Z00.8 V72.85   3. Psychosis, unspecified psychosis type F29 298.9             ED Disposition Condition    Transfer to Psych Facility         ED Prescriptions     None        Follow-up Information    None                                    Jewel Caldwell MD  04/04/20 0211

## 2020-04-04 NOTE — ED NOTES
"Per Cari, "Patient accepted by Zoie at [facility] ([address] [place] [address]) for the service of Dr. Ge.   Report to be called to [phone] etc. 101.."  "
Chidi (son): [phone]  
EKG performed and given to physician @1931  
Pt ambulatory to and from restroom  
Pt being cooperative c security and tech at this time and changing into facility approved clothing.  
Pt belongings:  - pair of gold earrings  - gold ring  - blue shirt  - black/white slippers  - white bra  - multi color sweater  - pair of socks  
Pt is calm and cooperative at this time.   
Pt is lying on stretcher c respirations even, unlabored c NADN. Requesting to speak to son. Phone provided.   Bed locked and low c side rail up.   PCT at bedside for 1:1 monitoring.   
Pt is sitting on the edge of the bed c respirations even, unlabored. Speaking c security about home life. Orange juice provided and accepted by pt.   
Pt wandering around hospital. Tech and Security trying to escort pt to room. Pt yelling and screaming at staff to get off of her and leave her alone. Pt being non compliant.   
Pt wanting to be discharge. Explained to pt that she is on a 72 hr hold. Pt is continuing to get out of bed and go to front of door. Redirect back into the room. Security and PCT at bedside   
RT at bedside for EKG  
Report given to representative at [facility]   
Zoie Reddy, from [facility] asking questions on pt to see about acceptance.   
Principal Discharge DX:	Vaginal bleeding in pregnancy, first trimester

## 2020-04-13 ENCOUNTER — PATIENT OUTREACH (OUTPATIENT)
Dept: ADMINISTRATIVE | Facility: OTHER | Age: 70
End: 2020-04-13

## 2020-04-23 ENCOUNTER — PATIENT MESSAGE (OUTPATIENT)
Dept: INTERNAL MEDICINE | Facility: CLINIC | Age: 70
End: 2020-04-23

## 2020-04-26 RX ORDER — QUETIAPINE FUMARATE 25 MG/1
25 TABLET, FILM COATED ORAL NIGHTLY
Qty: 30 TABLET | Refills: 5 | Status: SHIPPED | OUTPATIENT
Start: 2020-04-26 | End: 2021-04-26

## 2020-04-26 RX ORDER — QUETIAPINE FUMARATE 100 MG/1
100 TABLET, FILM COATED ORAL NIGHTLY
Qty: 30 TABLET | Refills: 5 | Status: SHIPPED | OUTPATIENT
Start: 2020-04-26 | End: 2021-04-26

## 2020-05-11 ENCOUNTER — PATIENT MESSAGE (OUTPATIENT)
Dept: INTERNAL MEDICINE | Facility: CLINIC | Age: 70
End: 2020-05-11

## 2020-05-11 RX ORDER — DONEPEZIL HYDROCHLORIDE 5 MG/1
5 TABLET, FILM COATED ORAL EVERY MORNING
Qty: 30 TABLET | Refills: 6 | Status: ON HOLD | OUTPATIENT
Start: 2020-05-11 | End: 2023-04-28 | Stop reason: HOSPADM

## 2020-05-11 RX ORDER — HALOPERIDOL 0.5 MG/1
TABLET ORAL
COMMUNITY
Start: 2020-04-13 | End: 2020-05-11

## 2020-05-11 RX ORDER — DONEPEZIL HYDROCHLORIDE 5 MG/1
TABLET, FILM COATED ORAL
Status: CANCELLED | OUTPATIENT
Start: 2020-05-11

## 2020-05-11 RX ORDER — HALOPERIDOL 0.5 MG/1
0.25 TABLET ORAL EVERY MORNING
Qty: 15 TABLET | Refills: 6 | Status: ON HOLD | OUTPATIENT
Start: 2020-05-11 | End: 2023-04-28 | Stop reason: HOSPADM

## 2020-05-11 RX ORDER — HALOPERIDOL 0.5 MG/1
TABLET ORAL
Status: CANCELLED | OUTPATIENT
Start: 2020-05-11

## 2020-05-11 RX ORDER — DONEPEZIL HYDROCHLORIDE 5 MG/1
TABLET, FILM COATED ORAL
COMMUNITY
Start: 2020-04-11 | End: 2020-05-11

## 2020-05-19 ENCOUNTER — OFFICE VISIT (OUTPATIENT)
Dept: INTERNAL MEDICINE | Facility: CLINIC | Age: 70
End: 2020-05-19
Payer: MEDICARE

## 2020-05-19 VITALS
HEIGHT: 60 IN | RESPIRATION RATE: 18 BRPM | SYSTOLIC BLOOD PRESSURE: 132 MMHG | DIASTOLIC BLOOD PRESSURE: 72 MMHG | HEART RATE: 56 BPM | TEMPERATURE: 98 F | BODY MASS INDEX: 25.58 KG/M2 | OXYGEN SATURATION: 98 % | WEIGHT: 130.31 LBS

## 2020-05-19 DIAGNOSIS — I10 ESSENTIAL HYPERTENSION: ICD-10-CM

## 2020-05-19 DIAGNOSIS — F03.91 DEMENTIA WITH BEHAVIORAL DISTURBANCE, UNSPECIFIED DEMENTIA TYPE: Primary | ICD-10-CM

## 2020-05-19 PROCEDURE — 1159F MED LIST DOCD IN RCRD: CPT | Mod: S$GLB,,, | Performed by: INTERNAL MEDICINE

## 2020-05-19 PROCEDURE — 3075F SYST BP GE 130 - 139MM HG: CPT | Mod: CPTII,S$GLB,, | Performed by: INTERNAL MEDICINE

## 2020-05-19 PROCEDURE — 3078F DIAST BP <80 MM HG: CPT | Mod: CPTII,S$GLB,, | Performed by: INTERNAL MEDICINE

## 2020-05-19 PROCEDURE — 1126F AMNT PAIN NOTED NONE PRSNT: CPT | Mod: S$GLB,,, | Performed by: INTERNAL MEDICINE

## 2020-05-19 PROCEDURE — 99999 PR PBB SHADOW E&M-EST. PATIENT-LVL III: CPT | Mod: PBBFAC,,, | Performed by: INTERNAL MEDICINE

## 2020-05-19 PROCEDURE — 99999 PR PBB SHADOW E&M-EST. PATIENT-LVL III: ICD-10-PCS | Mod: PBBFAC,,, | Performed by: INTERNAL MEDICINE

## 2020-05-19 PROCEDURE — 99213 PR OFFICE/OUTPT VISIT, EST, LEVL III, 20-29 MIN: ICD-10-PCS | Mod: S$GLB,,, | Performed by: INTERNAL MEDICINE

## 2020-05-19 PROCEDURE — 3075F PR MOST RECENT SYSTOLIC BLOOD PRESS GE 130-139MM HG: ICD-10-PCS | Mod: CPTII,S$GLB,, | Performed by: INTERNAL MEDICINE

## 2020-05-19 PROCEDURE — 3078F PR MOST RECENT DIASTOLIC BLOOD PRESSURE < 80 MM HG: ICD-10-PCS | Mod: CPTII,S$GLB,, | Performed by: INTERNAL MEDICINE

## 2020-05-19 PROCEDURE — 1126F PR PAIN SEVERITY QUANTIFIED, NO PAIN PRESENT: ICD-10-PCS | Mod: S$GLB,,, | Performed by: INTERNAL MEDICINE

## 2020-05-19 PROCEDURE — 1159F PR MEDICATION LIST DOCUMENTED IN MEDICAL RECORD: ICD-10-PCS | Mod: S$GLB,,, | Performed by: INTERNAL MEDICINE

## 2020-05-19 PROCEDURE — 1101F PT FALLS ASSESS-DOCD LE1/YR: CPT | Mod: CPTII,S$GLB,, | Performed by: INTERNAL MEDICINE

## 2020-05-19 PROCEDURE — 1101F PR PT FALLS ASSESS DOC 0-1 FALLS W/OUT INJ PAST YR: ICD-10-PCS | Mod: CPTII,S$GLB,, | Performed by: INTERNAL MEDICINE

## 2020-05-19 PROCEDURE — 99213 OFFICE O/P EST LOW 20 MIN: CPT | Mod: S$GLB,,, | Performed by: INTERNAL MEDICINE

## 2020-05-19 RX ORDER — LOSARTAN POTASSIUM 50 MG/1
TABLET ORAL
Status: ON HOLD | COMMUNITY
Start: 2020-04-11 | End: 2023-04-26 | Stop reason: HOSPADM

## 2020-05-19 NOTE — PROGRESS NOTES
Subjective:       Patient ID: Miranda Houston is a 69 y.o. female.    Chief Complaint: Follow-up (6 WKS)    HPI   The patient presents today accompanied her daughter-in-law.  The patient is sleeping better - from 8:00 p.m. to 7:00 a.m. at the present time.  She is status post hospitalization at [facility] x 1 week for dementia with behavioral disturbance.  At the present time she is rarely getting up and wandering around.  She is sleeping throughout the night.  She has not exhibited any daytime sleepiness or agitation since being discharged home.    She has not experienced any cold or allergy symptoms.  She denies having any cough, shortness of breath, or chest congestion.    The patient does have occasional bilateral knee pain due to arthritis.    Review of Systems   Constitutional: Positive for activity change. Negative for appetite change, fatigue and unexpected weight change.   Eyes: Negative for visual disturbance.   Respiratory: Negative for cough and shortness of breath.    Cardiovascular: Negative for chest pain, palpitations and leg swelling.   Gastrointestinal: Negative for abdominal pain, blood in stool, constipation and diarrhea.   Genitourinary: Negative for dysuria and hematuria.   Musculoskeletal: Positive for arthralgias. Negative for neck pain and neck stiffness.   Skin: Negative for rash.   Neurological: Negative for dizziness, syncope and headaches.   Psychiatric/Behavioral: Positive for decreased concentration. Negative for hallucinations, sleep disturbance and suicidal ideas. The patient is not nervous/anxious.        Objective:      Physical Exam   Constitutional: She is oriented to person, place, and time. She appears well-developed and well-nourished. No distress.   HENT:   Head: Normocephalic and atraumatic.   Eyes: Conjunctivae and EOM are normal. No scleral icterus.   Neck: Normal range of motion. Neck supple. No JVD present. No thyromegaly present.   Cardiovascular:  Normal rate, regular rhythm, normal heart sounds and intact distal pulses. Exam reveals no gallop and no friction rub.   No murmur heard.  Pulmonary/Chest: Effort normal and breath sounds normal. No respiratory distress. She has no wheezes. She has no rales.   Abdominal: Soft. Bowel sounds are normal. She exhibits no mass. There is no tenderness.   Lymphadenopathy:     She has no cervical adenopathy.   Neurological: She is alert and oriented to person, place, and time.   Skin: Skin is warm and dry. No rash noted.   Psychiatric: Her behavior is normal.   The patient is calm and responsive.   Nursing note and vitals reviewed.      Assessment:       1. Dementia with behavioral disturbance, unspecified dementia type    2. Essential hypertension        Plan:     Miranda was seen today for follow-up.  Current therapy will be continued.  The patient is to return to clinic in 3 months.    Diagnoses and all orders for this visit:    Dementia with behavioral disturbance, unspecified dementia type    Essential hypertension

## 2020-06-01 ENCOUNTER — PATIENT MESSAGE (OUTPATIENT)
Dept: INTERNAL MEDICINE | Facility: CLINIC | Age: 70
End: 2020-06-01

## 2020-06-01 DIAGNOSIS — F03.91 DEMENTIA WITH BEHAVIORAL DISTURBANCE, UNSPECIFIED DEMENTIA TYPE: ICD-10-CM

## 2020-06-01 RX ORDER — MEMANTINE HYDROCHLORIDE 10 MG/1
10 TABLET ORAL 2 TIMES DAILY
Qty: 180 TABLET | Refills: 3 | Status: SHIPPED | OUTPATIENT
Start: 2020-06-01 | End: 2021-06-01

## 2020-06-02 NOTE — TELEPHONE ENCOUNTER
The dose of Namenda will be increased to twice daily.  A new prescription order has been sent to the pharmacy.  An e-mail message has been sent to patient's son Manohar.    Consultation with neurology or psychiatry is recommended for managing such patients with dementia more severe neuropsychiatric symptoms.

## 2020-06-08 ENCOUNTER — HOSPITAL ENCOUNTER (OUTPATIENT)
Dept: RADIOLOGY | Facility: HOSPITAL | Age: 70
Discharge: HOME | End: 2020-06-08
Attending: INTERNAL MEDICINE
Payer: MEDICARE

## 2020-06-08 ENCOUNTER — OFFICE VISIT (OUTPATIENT)
Dept: INTERNAL MEDICINE | Facility: CLINIC | Age: 70
End: 2020-06-08
Payer: MEDICARE

## 2020-06-08 VITALS
BODY MASS INDEX: 26.57 KG/M2 | RESPIRATION RATE: 14 BRPM | WEIGHT: 144.38 LBS | HEIGHT: 62 IN | OXYGEN SATURATION: 99 % | SYSTOLIC BLOOD PRESSURE: 140 MMHG | DIASTOLIC BLOOD PRESSURE: 80 MMHG | TEMPERATURE: 97 F | HEART RATE: 64 BPM

## 2020-06-08 DIAGNOSIS — Z00.00 WELL ADULT EXAM: ICD-10-CM

## 2020-06-08 DIAGNOSIS — R21 RASH: ICD-10-CM

## 2020-06-08 DIAGNOSIS — F03.91 DEMENTIA WITH BEHAVIORAL DISTURBANCE, UNSPECIFIED DEMENTIA TYPE: ICD-10-CM

## 2020-06-08 DIAGNOSIS — I10 ESSENTIAL HYPERTENSION: ICD-10-CM

## 2020-06-08 DIAGNOSIS — Z00.00 WELL ADULT EXAM: Primary | ICD-10-CM

## 2020-06-08 PROCEDURE — 1159F PR MEDICATION LIST DOCUMENTED IN MEDICAL RECORD: ICD-10-PCS | Mod: S$GLB,,, | Performed by: INTERNAL MEDICINE

## 2020-06-08 PROCEDURE — 86580 POCT TB SKIN TEST: ICD-10-PCS | Mod: S$GLB,,, | Performed by: INTERNAL MEDICINE

## 2020-06-08 PROCEDURE — 3079F PR MOST RECENT DIASTOLIC BLOOD PRESSURE 80-89 MM HG: ICD-10-PCS | Mod: CPTII,S$GLB,, | Performed by: INTERNAL MEDICINE

## 2020-06-08 PROCEDURE — 1159F MED LIST DOCD IN RCRD: CPT | Mod: S$GLB,,, | Performed by: INTERNAL MEDICINE

## 2020-06-08 PROCEDURE — 1101F PR PT FALLS ASSESS DOC 0-1 FALLS W/OUT INJ PAST YR: ICD-10-PCS | Mod: CPTII,S$GLB,, | Performed by: INTERNAL MEDICINE

## 2020-06-08 PROCEDURE — 3077F PR MOST RECENT SYSTOLIC BLOOD PRESSURE >= 140 MM HG: ICD-10-PCS | Mod: CPTII,S$GLB,, | Performed by: INTERNAL MEDICINE

## 2020-06-08 PROCEDURE — 71046 X-RAY EXAM CHEST 2 VIEWS: CPT | Mod: TC,PO

## 2020-06-08 PROCEDURE — 71046 XR CHEST PA AND LATERAL: ICD-10-PCS | Mod: 26,,, | Performed by: RADIOLOGY

## 2020-06-08 PROCEDURE — 99999 PR PBB SHADOW E&M-EST. PATIENT-LVL IV: ICD-10-PCS | Mod: PBBFAC,,, | Performed by: INTERNAL MEDICINE

## 2020-06-08 PROCEDURE — 99214 OFFICE O/P EST MOD 30 MIN: CPT | Mod: S$GLB,,, | Performed by: INTERNAL MEDICINE

## 2020-06-08 PROCEDURE — 99999 PR PBB SHADOW E&M-EST. PATIENT-LVL IV: CPT | Mod: PBBFAC,,, | Performed by: INTERNAL MEDICINE

## 2020-06-08 PROCEDURE — 3077F SYST BP >= 140 MM HG: CPT | Mod: CPTII,S$GLB,, | Performed by: INTERNAL MEDICINE

## 2020-06-08 PROCEDURE — 3079F DIAST BP 80-89 MM HG: CPT | Mod: CPTII,S$GLB,, | Performed by: INTERNAL MEDICINE

## 2020-06-08 PROCEDURE — 1126F AMNT PAIN NOTED NONE PRSNT: CPT | Mod: S$GLB,,, | Performed by: INTERNAL MEDICINE

## 2020-06-08 PROCEDURE — 99214 PR OFFICE/OUTPT VISIT, EST, LEVL IV, 30-39 MIN: ICD-10-PCS | Mod: S$GLB,,, | Performed by: INTERNAL MEDICINE

## 2020-06-08 PROCEDURE — 1126F PR PAIN SEVERITY QUANTIFIED, NO PAIN PRESENT: ICD-10-PCS | Mod: S$GLB,,, | Performed by: INTERNAL MEDICINE

## 2020-06-08 PROCEDURE — 71046 X-RAY EXAM CHEST 2 VIEWS: CPT | Mod: 26,,, | Performed by: RADIOLOGY

## 2020-06-08 PROCEDURE — 1101F PT FALLS ASSESS-DOCD LE1/YR: CPT | Mod: CPTII,S$GLB,, | Performed by: INTERNAL MEDICINE

## 2020-06-08 PROCEDURE — 86580 TB INTRADERMAL TEST: CPT | Mod: S$GLB,,, | Performed by: INTERNAL MEDICINE

## 2020-06-08 RX ORDER — ECONAZOLE NITRATE 10 MG/G
CREAM TOPICAL DAILY
Qty: 30 G | Refills: 2 | Status: SHIPPED | OUTPATIENT
Start: 2020-06-08

## 2020-06-08 NOTE — PROGRESS NOTES
Answers for HPI/ROS submitted by the patient on 6/6/2020   activity change: No  unexpected weight change: No  neck pain: No  hearing loss: No  rhinorrhea: No  trouble swallowing: No  eye discharge: No  visual disturbance: No  chest tightness: No  wheezing: No  chest pain: No  palpitations: No  blood in stool: No  constipation: No  vomiting: No  diarrhea: No  polydipsia: No  polyuria: No  difficulty urinating: No  hematuria: No  menstrual problem: No  dysuria: No  joint swelling: No  arthralgias: Yes  headaches: No  weakness: No  confusion: Yes  dysphoric mood: No

## 2020-06-10 ENCOUNTER — CLINICAL SUPPORT (OUTPATIENT)
Dept: INTERNAL MEDICINE | Facility: CLINIC | Age: 70
End: 2020-06-10
Payer: MEDICARE

## 2020-06-10 LAB
TB INDURATION - 48 HR READ: 0 MM
TB INDURATION - 72 HR READ: NORMAL
TB SKIN TEST - 48 HR READ: NEGATIVE
TB SKIN TEST - 72 HR READ: NORMAL

## 2020-06-10 NOTE — PROGRESS NOTES
Normal. 0 tb skin test.  Gail documented to mar.    Patient daughter given paperwork for nursing home w/ copy of cxr and tb results.

## 2020-06-18 ENCOUNTER — OFFICE VISIT (OUTPATIENT)
Dept: URGENT CARE | Facility: CLINIC | Age: 70
End: 2020-06-18
Payer: MEDICARE

## 2020-06-18 VITALS
RESPIRATION RATE: 20 BRPM | HEIGHT: 60 IN | TEMPERATURE: 99 F | BODY MASS INDEX: 28.35 KG/M2 | OXYGEN SATURATION: 98 % | SYSTOLIC BLOOD PRESSURE: 150 MMHG | DIASTOLIC BLOOD PRESSURE: 80 MMHG | WEIGHT: 144.38 LBS | HEART RATE: 77 BPM

## 2020-06-18 DIAGNOSIS — Z11.59 ENCOUNTER FOR SCREENING FOR OTHER VIRAL DISEASES: Primary | ICD-10-CM

## 2020-06-18 PROCEDURE — 99211 OFF/OP EST MAY X REQ PHY/QHP: CPT | Mod: S$GLB,,, | Performed by: EMERGENCY MEDICINE

## 2020-06-18 PROCEDURE — 99211 PR OFFICE/OUTPT VISIT, EST, LEVL I: ICD-10-PCS | Mod: S$GLB,,, | Performed by: EMERGENCY MEDICINE

## 2020-06-18 PROCEDURE — U0003 INFECTIOUS AGENT DETECTION BY NUCLEIC ACID (DNA OR RNA); SEVERE ACUTE RESPIRATORY SYNDROME CORONAVIRUS 2 (SARS-COV-2) (CORONAVIRUS DISEASE [COVID-19]), AMPLIFIED PROBE TECHNIQUE, MAKING USE OF HIGH THROUGHPUT TECHNOLOGIES AS DESCRIBED BY CMS-2020-01-R: HCPCS

## 2020-06-18 NOTE — PATIENT INSTRUCTIONS
You have been tested for corona virus and results are pending.  Someone will contact you in 24-48 hours with the results

## 2020-06-18 NOTE — PROGRESS NOTES
[facility] - Visit Note                                           Chief Complaint  69 y.o. female with COVID-19 Concerns    History of Present Illness  Miranda Houston presents to the urgent care with request for Covid testing for nursing home placement. Patient is accompanied by her niece. No symptoms. Merlin has moderate dementia.  This  Visit was conducted remotely per [facility] protocol.    Past Medical History:   Diagnosis Date    Chronic rhinitis     Colon polyps     Hyperlipidemia     Hypertension     Pyoderma gangrenosum     left leg    Steroid-induced diabetes mellitus      Past Surgical History:   Procedure Laterality Date     SECTION      2 surgeries.      Review of patient's allergies indicates:   Allergen Reactions    No known drug allergies         Review of Systems and Physical Exam     Review of Systems  -- Constitution - no fever, no weight loss, no loss of consciousness  -- Eyes - no changes in vision, no redness, no swelling, no discharge  -- Ear, Nose - no  earache, no loss of hearing, no epistaxis  -- Mouth,Throat - no sore throat, no toothache, normal voice, normal swallowing  -- Respiratory - denies cough and congestion, no shortness of breath, no wheezing, no increased WOB   -- Cardiovascular - denies chest pain, no palpitations, no lower extremity edema  -- Gastrointestinal - denies abdominal pain, denies nausea, vomiting, and diarrhea  -- Genitourinary - no dysuria, denies flank pain, no hematuria or frequency   -- Musculoskeletal - denies back pain, negative for myalgias and arthralgias   -- Neurological - no headache, no neurologic changes, no loss of bladder or bowel function no seizure like activity, no changes in hearing or vision  -- Skin - denies skin changes, no rash, no hives, no suspected skin infection    Vital Signs   height is 5' (1.524 m) and weight is 65.5 kg (144 lb 6.4 oz). Her temperature is 99 °F (37.2 °C). Her blood pressure is 150/80  (abnormal) and her pulse is 77. Her respiration is 20 and oxygen saturation is 98%.      Physical Exam  Not done    Treatment Course, Evaluation, and Medical Decision Makin. Physical exam not done  2. COVID pending      Diagnosis  --  Encounter for viral screen  Disposition and Plan  -- Disposition: home  -- Condition: stable  -- Follow-up: Patient to follow up with Sabino Calle MD in 1-2 days, and any specialists noted on discharge paperwork  -- I advised the patient that we have found no life threatening condition today and have provided recommendations his/her care  -- At this time, I believe the patient is clinically stable for discharge.   -- The patient acknowledges that ongoing follow up with a MD is required   -- Patient agrees to comply with all instruction and direction given in the urgent care  -- Patient counseled on strict return precautions as discussed

## 2020-06-19 ENCOUNTER — TELEPHONE (OUTPATIENT)
Dept: URGENT CARE | Facility: CLINIC | Age: 70
End: 2020-06-19

## 2020-06-19 LAB — SARS-COV-2 RNA RESP QL NAA+PROBE: NOT DETECTED

## 2020-07-24 ENCOUNTER — LAB VISIT (OUTPATIENT)
Dept: LAB | Facility: OTHER | Age: 70
End: 2020-07-24
Payer: MEDICARE

## 2020-07-24 DIAGNOSIS — Z03.818 ENCOUNTER FOR OBSERVATION FOR SUSPECTED EXPOSURE TO OTHER BIOLOGICAL AGENTS RULED OUT: ICD-10-CM

## 2020-07-24 PROCEDURE — U0003 INFECTIOUS AGENT DETECTION BY NUCLEIC ACID (DNA OR RNA); SEVERE ACUTE RESPIRATORY SYNDROME CORONAVIRUS 2 (SARS-COV-2) (CORONAVIRUS DISEASE [COVID-19]), AMPLIFIED PROBE TECHNIQUE, MAKING USE OF HIGH THROUGHPUT TECHNOLOGIES AS DESCRIBED BY CMS-2020-01-R: HCPCS

## 2020-07-27 LAB — SARS-COV-2 RNA RESP QL NAA+PROBE: NORMAL

## 2020-07-31 ENCOUNTER — LAB VISIT (OUTPATIENT)
Dept: LAB | Facility: OTHER | Age: 70
End: 2020-07-31
Payer: MEDICARE

## 2020-07-31 DIAGNOSIS — Z03.818 ENCOUNTER FOR OBSERVATION FOR SUSPECTED EXPOSURE TO OTHER BIOLOGICAL AGENTS RULED OUT: ICD-10-CM

## 2020-07-31 DIAGNOSIS — Z20.822 SUSPECTED COVID-19 VIRUS INFECTION: ICD-10-CM

## 2020-07-31 PROCEDURE — U0003 INFECTIOUS AGENT DETECTION BY NUCLEIC ACID (DNA OR RNA); SEVERE ACUTE RESPIRATORY SYNDROME CORONAVIRUS 2 (SARS-COV-2) (CORONAVIRUS DISEASE [COVID-19]), AMPLIFIED PROBE TECHNIQUE, MAKING USE OF HIGH THROUGHPUT TECHNOLOGIES AS DESCRIBED BY CMS-2020-01-R: HCPCS

## 2020-08-01 LAB — SARS-COV-2 RNA RESP QL NAA+PROBE: NOT DETECTED

## 2020-08-07 ENCOUNTER — LAB VISIT (OUTPATIENT)
Dept: LAB | Facility: OTHER | Age: 70
End: 2020-08-07
Payer: MEDICARE

## 2020-08-07 DIAGNOSIS — Z03.818 ENCOUNTER FOR OBSERVATION FOR SUSPECTED EXPOSURE TO OTHER BIOLOGICAL AGENTS RULED OUT: ICD-10-CM

## 2020-08-07 PROCEDURE — U0003 INFECTIOUS AGENT DETECTION BY NUCLEIC ACID (DNA OR RNA); SEVERE ACUTE RESPIRATORY SYNDROME CORONAVIRUS 2 (SARS-COV-2) (CORONAVIRUS DISEASE [COVID-19]), AMPLIFIED PROBE TECHNIQUE, MAKING USE OF HIGH THROUGHPUT TECHNOLOGIES AS DESCRIBED BY CMS-2020-01-R: HCPCS

## 2020-08-12 LAB — SARS-COV-2 RNA RESP QL NAA+PROBE: NORMAL

## 2020-09-18 ENCOUNTER — HOSPITAL ENCOUNTER (EMERGENCY)
Facility: HOSPITAL | Age: 70
Discharge: PSYCHIATRIC OR SUBSTANCE USE FACILITY | End: 2020-09-19
Attending: EMERGENCY MEDICINE
Payer: MEDICARE

## 2020-09-18 DIAGNOSIS — F03.90 DEMENTIA WITHOUT BEHAVIORAL DISTURBANCE, UNSPECIFIED DEMENTIA TYPE: ICD-10-CM

## 2020-09-18 DIAGNOSIS — Z00.8 MEDICAL CLEARANCE FOR PSYCHIATRIC ADMISSION: Primary | ICD-10-CM

## 2020-09-18 PROCEDURE — 99285 EMERGENCY DEPT VISIT HI MDM: CPT | Mod: ,,, | Performed by: PHYSICIAN ASSISTANT

## 2020-09-18 PROCEDURE — 99284 EMERGENCY DEPT VISIT MOD MDM: CPT | Mod: 25

## 2020-09-18 PROCEDURE — 99285 PR EMERGENCY DEPT VISIT,LEVEL V: ICD-10-PCS | Mod: ,,, | Performed by: PHYSICIAN ASSISTANT

## 2020-09-19 VITALS
HEIGHT: 66 IN | DIASTOLIC BLOOD PRESSURE: 55 MMHG | OXYGEN SATURATION: 100 % | SYSTOLIC BLOOD PRESSURE: 112 MMHG | HEART RATE: 52 BPM | BODY MASS INDEX: 23.2 KG/M2 | TEMPERATURE: 98 F | RESPIRATION RATE: 18 BRPM | WEIGHT: 144.38 LBS

## 2020-09-19 LAB
ALBUMIN SERPL BCP-MCNC: 3.9 G/DL (ref 3.5–5.2)
ALP SERPL-CCNC: 80 U/L (ref 55–135)
ALT SERPL W/O P-5'-P-CCNC: 18 U/L (ref 10–44)
AMPHET+METHAMPHET UR QL: NEGATIVE
ANION GAP SERPL CALC-SCNC: 11 MMOL/L (ref 8–16)
APAP SERPL-MCNC: <3 UG/ML (ref 10–20)
AST SERPL-CCNC: 19 U/L (ref 10–40)
BARBITURATES UR QL SCN>200 NG/ML: NEGATIVE
BASOPHILS # BLD AUTO: 0.05 K/UL (ref 0–0.2)
BASOPHILS NFR BLD: 1.1 % (ref 0–1.9)
BENZODIAZ UR QL SCN>200 NG/ML: NEGATIVE
BILIRUB SERPL-MCNC: 0.3 MG/DL (ref 0.1–1)
BILIRUB UR QL STRIP: NEGATIVE
BUN SERPL-MCNC: 15 MG/DL (ref 8–23)
BZE UR QL SCN: NEGATIVE
CALCIUM SERPL-MCNC: 9 MG/DL (ref 8.7–10.5)
CANNABINOIDS UR QL SCN: NEGATIVE
CHLORIDE SERPL-SCNC: 107 MMOL/L (ref 95–110)
CLARITY UR REFRACT.AUTO: ABNORMAL
CO2 SERPL-SCNC: 27 MMOL/L (ref 23–29)
COLOR UR AUTO: YELLOW
CREAT SERPL-MCNC: 0.8 MG/DL (ref 0.5–1.4)
CREAT UR-MCNC: 109 MG/DL (ref 15–325)
DIFFERENTIAL METHOD: ABNORMAL
EOSINOPHIL # BLD AUTO: 0 K/UL (ref 0–0.5)
EOSINOPHIL NFR BLD: 0.6 % (ref 0–8)
ERYTHROCYTE [DISTWIDTH] IN BLOOD BY AUTOMATED COUNT: 13.6 % (ref 11.5–14.5)
EST. GFR  (AFRICAN AMERICAN): >60 ML/MIN/1.73 M^2
EST. GFR  (NON AFRICAN AMERICAN): >60 ML/MIN/1.73 M^2
ETHANOL SERPL-MCNC: <10 MG/DL
GLUCOSE SERPL-MCNC: 91 MG/DL (ref 70–110)
GLUCOSE UR QL STRIP: NEGATIVE
HCT VFR BLD AUTO: 38.9 % (ref 37–48.5)
HGB BLD-MCNC: 11.6 G/DL (ref 12–16)
HGB UR QL STRIP: NEGATIVE
IMM GRANULOCYTES # BLD AUTO: 0.01 K/UL (ref 0–0.04)
IMM GRANULOCYTES NFR BLD AUTO: 0.2 % (ref 0–0.5)
KETONES UR QL STRIP: NEGATIVE
LEUKOCYTE ESTERASE UR QL STRIP: NEGATIVE
LYMPHOCYTES # BLD AUTO: 2.1 K/UL (ref 1–4.8)
LYMPHOCYTES NFR BLD: 43.1 % (ref 18–48)
MCH RBC QN AUTO: 28.4 PG (ref 27–31)
MCHC RBC AUTO-ENTMCNC: 29.8 G/DL (ref 32–36)
MCV RBC AUTO: 95 FL (ref 82–98)
METHADONE UR QL SCN>300 NG/ML: NEGATIVE
MONOCYTES # BLD AUTO: 0.5 K/UL (ref 0.3–1)
MONOCYTES NFR BLD: 10.1 % (ref 4–15)
NEUTROPHILS # BLD AUTO: 2.1 K/UL (ref 1.8–7.7)
NEUTROPHILS NFR BLD: 44.9 % (ref 38–73)
NITRITE UR QL STRIP: NEGATIVE
NRBC BLD-RTO: 0 /100 WBC
OPIATES UR QL SCN: NEGATIVE
PCP UR QL SCN>25 NG/ML: NEGATIVE
PH UR STRIP: 6 [PH] (ref 5–8)
PLATELET # BLD AUTO: 259 K/UL (ref 150–350)
PMV BLD AUTO: 10 FL (ref 9.2–12.9)
POTASSIUM SERPL-SCNC: 3.6 MMOL/L (ref 3.5–5.1)
PROT SERPL-MCNC: 7.4 G/DL (ref 6–8.4)
PROT UR QL STRIP: NEGATIVE
RBC # BLD AUTO: 4.08 M/UL (ref 4–5.4)
SARS-COV-2 RDRP RESP QL NAA+PROBE: NEGATIVE
SODIUM SERPL-SCNC: 145 MMOL/L (ref 136–145)
SP GR UR STRIP: 1.02 (ref 1–1.03)
TOXICOLOGY INFORMATION: NORMAL
TSH SERPL DL<=0.005 MIU/L-ACNC: 2.6 UIU/ML (ref 0.4–4)
URN SPEC COLLECT METH UR: ABNORMAL
WBC # BLD AUTO: 4.76 K/UL (ref 3.9–12.7)

## 2020-09-19 PROCEDURE — 80307 DRUG TEST PRSMV CHEM ANLYZR: CPT

## 2020-09-19 PROCEDURE — 80320 DRUG SCREEN QUANTALCOHOLS: CPT

## 2020-09-19 PROCEDURE — 80053 COMPREHEN METABOLIC PANEL: CPT

## 2020-09-19 PROCEDURE — 85025 COMPLETE CBC W/AUTO DIFF WBC: CPT

## 2020-09-19 PROCEDURE — 84443 ASSAY THYROID STIM HORMONE: CPT

## 2020-09-19 PROCEDURE — 80329 ANALGESICS NON-OPIOID 1 OR 2: CPT

## 2020-09-19 PROCEDURE — 81003 URINALYSIS AUTO W/O SCOPE: CPT

## 2020-09-19 PROCEDURE — U0002 COVID-19 LAB TEST NON-CDC: HCPCS
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
## 2020-09-19 NOTE — DISCHARGE INSTRUCTIONS
No emergent findings seen on your ED work-up today and are medically cleared for [facility].
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
## 2020-10-16 ENCOUNTER — LAB VISIT (OUTPATIENT)
Dept: LAB | Facility: OTHER | Age: 70
End: 2020-10-16
Payer: MEDICARE

## 2020-10-16 DIAGNOSIS — Z03.818 ENCOUNTER FOR OBSERVATION FOR SUSPECTED EXPOSURE TO OTHER BIOLOGICAL AGENTS RULED OUT: ICD-10-CM

## 2020-10-16 PROCEDURE — U0003 INFECTIOUS AGENT DETECTION BY NUCLEIC ACID (DNA OR RNA); SEVERE ACUTE RESPIRATORY SYNDROME CORONAVIRUS 2 (SARS-COV-2) (CORONAVIRUS DISEASE [COVID-19]), AMPLIFIED PROBE TECHNIQUE, MAKING USE OF HIGH THROUGHPUT TECHNOLOGIES AS DESCRIBED BY CMS-2020-01-R: HCPCS

## 2020-10-17 LAB — SARS-COV-2 RNA RESP QL NAA+PROBE: NOT DETECTED

## 2020-10-23 ENCOUNTER — LAB VISIT (OUTPATIENT)
Dept: LAB | Facility: OTHER | Age: 70
End: 2020-10-23
Attending: INTERNAL MEDICINE
Payer: MEDICARE

## 2020-10-23 DIAGNOSIS — Z03.818 ENCOUNTER FOR OBSERVATION FOR SUSPECTED EXPOSURE TO OTHER BIOLOGICAL AGENTS RULED OUT: ICD-10-CM

## 2020-10-23 PROCEDURE — U0003 INFECTIOUS AGENT DETECTION BY NUCLEIC ACID (DNA OR RNA); SEVERE ACUTE RESPIRATORY SYNDROME CORONAVIRUS 2 (SARS-COV-2) (CORONAVIRUS DISEASE [COVID-19]), AMPLIFIED PROBE TECHNIQUE, MAKING USE OF HIGH THROUGHPUT TECHNOLOGIES AS DESCRIBED BY CMS-2020-01-R: HCPCS

## 2020-10-24 ENCOUNTER — HOSPITAL ENCOUNTER (EMERGENCY)
Facility: HOSPITAL | Age: 70
Discharge: PSYCHIATRIC OR SUBSTANCE USE FACILITY | End: 2020-10-25
Attending: EMERGENCY MEDICINE
Payer: MEDICARE

## 2020-10-24 VITALS
OXYGEN SATURATION: 99 % | TEMPERATURE: 99 F | SYSTOLIC BLOOD PRESSURE: 136 MMHG | HEART RATE: 66 BPM | WEIGHT: 150 LBS | HEIGHT: 66 IN | RESPIRATION RATE: 18 BRPM | BODY MASS INDEX: 24.11 KG/M2 | DIASTOLIC BLOOD PRESSURE: 68 MMHG

## 2020-10-24 DIAGNOSIS — R46.89 AGGRESSIVE BEHAVIOR: Primary | ICD-10-CM

## 2020-10-24 LAB
ALBUMIN SERPL BCP-MCNC: 3.6 G/DL (ref 3.5–5.2)
ALP SERPL-CCNC: 82 U/L (ref 55–135)
ALT SERPL W/O P-5'-P-CCNC: 16 U/L (ref 10–44)
AMPHET+METHAMPHET UR QL: NEGATIVE
ANION GAP SERPL CALC-SCNC: 9 MMOL/L (ref 8–16)
APAP SERPL-MCNC: <3 UG/ML (ref 10–20)
AST SERPL-CCNC: 15 U/L (ref 10–40)
BARBITURATES UR QL SCN>200 NG/ML: NEGATIVE
BASOPHILS # BLD AUTO: 0.03 K/UL (ref 0–0.2)
BASOPHILS NFR BLD: 0.6 % (ref 0–1.9)
BENZODIAZ UR QL SCN>200 NG/ML: NEGATIVE
BILIRUB SERPL-MCNC: 0.2 MG/DL (ref 0.1–1)
BILIRUB UR QL STRIP: NEGATIVE
BUN SERPL-MCNC: 14 MG/DL (ref 8–23)
BZE UR QL SCN: NEGATIVE
CALCIUM SERPL-MCNC: 8.7 MG/DL (ref 8.7–10.5)
CANNABINOIDS UR QL SCN: NEGATIVE
CHLORIDE SERPL-SCNC: 112 MMOL/L (ref 95–110)
CLARITY UR REFRACT.AUTO: CLEAR
CO2 SERPL-SCNC: 25 MMOL/L (ref 23–29)
COLOR UR AUTO: YELLOW
CREAT SERPL-MCNC: 0.8 MG/DL (ref 0.5–1.4)
CREAT UR-MCNC: 139 MG/DL (ref 15–325)
CTP QC/QA: YES
DIFFERENTIAL METHOD: ABNORMAL
EOSINOPHIL # BLD AUTO: 0 K/UL (ref 0–0.5)
EOSINOPHIL NFR BLD: 0.4 % (ref 0–8)
ERYTHROCYTE [DISTWIDTH] IN BLOOD BY AUTOMATED COUNT: 14.2 % (ref 11.5–14.5)
EST. GFR  (AFRICAN AMERICAN): >60 ML/MIN/1.73 M^2
EST. GFR  (NON AFRICAN AMERICAN): >60 ML/MIN/1.73 M^2
ETHANOL SERPL-MCNC: <10 MG/DL
GLUCOSE SERPL-MCNC: 80 MG/DL (ref 70–110)
GLUCOSE UR QL STRIP: NEGATIVE
HCT VFR BLD AUTO: 34.9 % (ref 37–48.5)
HGB BLD-MCNC: 10.7 G/DL (ref 12–16)
HGB UR QL STRIP: NEGATIVE
IMM GRANULOCYTES # BLD AUTO: 0.02 K/UL (ref 0–0.04)
IMM GRANULOCYTES NFR BLD AUTO: 0.4 % (ref 0–0.5)
KETONES UR QL STRIP: NEGATIVE
LEUKOCYTE ESTERASE UR QL STRIP: NEGATIVE
LYMPHOCYTES # BLD AUTO: 1.4 K/UL (ref 1–4.8)
LYMPHOCYTES NFR BLD: 27 % (ref 18–48)
MCH RBC QN AUTO: 28.9 PG (ref 27–31)
MCHC RBC AUTO-ENTMCNC: 30.7 G/DL (ref 32–36)
MCV RBC AUTO: 94 FL (ref 82–98)
METHADONE UR QL SCN>300 NG/ML: NEGATIVE
MONOCYTES # BLD AUTO: 0.5 K/UL (ref 0.3–1)
MONOCYTES NFR BLD: 9.2 % (ref 4–15)
NEUTROPHILS # BLD AUTO: 3.2 K/UL (ref 1.8–7.7)
NEUTROPHILS NFR BLD: 62.4 % (ref 38–73)
NITRITE UR QL STRIP: NEGATIVE
NRBC BLD-RTO: 0 /100 WBC
OPIATES UR QL SCN: NEGATIVE
PCP UR QL SCN>25 NG/ML: NEGATIVE
PH UR STRIP: 7 [PH] (ref 5–8)
PLATELET # BLD AUTO: 282 K/UL (ref 150–350)
PMV BLD AUTO: 9.8 FL (ref 9.2–12.9)
POTASSIUM SERPL-SCNC: 4.4 MMOL/L (ref 3.5–5.1)
PROT SERPL-MCNC: 7.4 G/DL (ref 6–8.4)
PROT UR QL STRIP: NEGATIVE
RBC # BLD AUTO: 3.7 M/UL (ref 4–5.4)
SARS-COV-2 RDRP RESP QL NAA+PROBE: NEGATIVE
SARS-COV-2 RNA RESP QL NAA+PROBE: NOT DETECTED
SODIUM SERPL-SCNC: 146 MMOL/L (ref 136–145)
SP GR UR STRIP: 1.02 (ref 1–1.03)
TOXICOLOGY INFORMATION: NORMAL
TSH SERPL DL<=0.005 MIU/L-ACNC: 1.5 UIU/ML (ref 0.4–4)
URN SPEC COLLECT METH UR: NORMAL
WBC # BLD AUTO: 5.12 K/UL (ref 3.9–12.7)

## 2020-10-24 PROCEDURE — 80307 DRUG TEST PRSMV CHEM ANLYZR: CPT

## 2020-10-24 PROCEDURE — U0002 COVID-19 LAB TEST NON-CDC: HCPCS | Performed by: EMERGENCY MEDICINE

## 2020-10-24 PROCEDURE — 84443 ASSAY THYROID STIM HORMONE: CPT

## 2020-10-24 PROCEDURE — 81003 URINALYSIS AUTO W/O SCOPE: CPT | Mod: 59

## 2020-10-24 PROCEDURE — 99284 EMERGENCY DEPT VISIT MOD MDM: CPT | Mod: CS,,, | Performed by: EMERGENCY MEDICINE

## 2020-10-24 PROCEDURE — 80053 COMPREHEN METABOLIC PANEL: CPT

## 2020-10-24 PROCEDURE — 99285 EMERGENCY DEPT VISIT HI MDM: CPT

## 2020-10-24 PROCEDURE — 85025 COMPLETE CBC W/AUTO DIFF WBC: CPT

## 2020-10-24 PROCEDURE — 99284 PR EMERGENCY DEPT VISIT,LEVEL IV: ICD-10-PCS | Mod: CS,,, | Performed by: EMERGENCY MEDICINE

## 2020-10-24 PROCEDURE — 80329 ANALGESICS NON-OPIOID 1 OR 2: CPT

## 2020-10-24 PROCEDURE — 80320 DRUG SCREEN QUANTALCOHOLS: CPT

## 2020-10-24 RX ORDER — HALOPERIDOL 5 MG/ML
5 INJECTION INTRAMUSCULAR EVERY 4 HOURS PRN
Status: DISCONTINUED | OUTPATIENT
Start: 2020-10-24 | End: 2020-10-25 | Stop reason: HOSPADM
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
## 2020-10-25 NOTE — ED NOTES
Pt belongings include 1 pair of gold earrings placed in a container with a pt sticker on it, blue pants, white top, yellow socks, black slippers, and navy blue cardigan
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
## 2020-11-30 ENCOUNTER — HOSPITAL ENCOUNTER (EMERGENCY)
Facility: HOSPITAL | Age: 70
Discharge: PSYCHIATRIC OR SUBSTANCE USE FACILITY | End: 2020-12-01
Attending: EMERGENCY MEDICINE
Payer: MEDICARE

## 2020-11-30 DIAGNOSIS — R46.89 AGGRESSIVE BEHAVIOR: Primary | ICD-10-CM

## 2020-11-30 LAB
ALBUMIN SERPL BCP-MCNC: 3.5 G/DL (ref 3.5–5.2)
ALP SERPL-CCNC: 85 U/L (ref 55–135)
ALT SERPL W/O P-5'-P-CCNC: 13 U/L (ref 10–44)
AMPHET+METHAMPHET UR QL: NEGATIVE
ANION GAP SERPL CALC-SCNC: 8 MMOL/L (ref 8–16)
APAP SERPL-MCNC: <3 UG/ML (ref 10–20)
AST SERPL-CCNC: 15 U/L (ref 10–40)
BACTERIA #/AREA URNS AUTO: ABNORMAL /HPF
BARBITURATES UR QL SCN>200 NG/ML: NEGATIVE
BASOPHILS # BLD AUTO: 0.05 K/UL (ref 0–0.2)
BASOPHILS NFR BLD: 0.8 % (ref 0–1.9)
BENZODIAZ UR QL SCN>200 NG/ML: NEGATIVE
BILIRUB SERPL-MCNC: 0.1 MG/DL (ref 0.1–1)
BILIRUB UR QL STRIP: NEGATIVE
BUN SERPL-MCNC: 19 MG/DL (ref 8–23)
BZE UR QL SCN: NEGATIVE
CALCIUM SERPL-MCNC: 8.6 MG/DL (ref 8.7–10.5)
CANNABINOIDS UR QL SCN: NEGATIVE
CHLORIDE SERPL-SCNC: 109 MMOL/L (ref 95–110)
CLARITY UR REFRACT.AUTO: CLEAR
CO2 SERPL-SCNC: 29 MMOL/L (ref 23–29)
COLOR UR AUTO: YELLOW
CREAT SERPL-MCNC: 0.9 MG/DL (ref 0.5–1.4)
CREAT UR-MCNC: 87 MG/DL (ref 15–325)
CTP QC/QA: YES
DIFFERENTIAL METHOD: ABNORMAL
EOSINOPHIL # BLD AUTO: 0 K/UL (ref 0–0.5)
EOSINOPHIL NFR BLD: 0.3 % (ref 0–8)
ERYTHROCYTE [DISTWIDTH] IN BLOOD BY AUTOMATED COUNT: 14.1 % (ref 11.5–14.5)
EST. GFR  (AFRICAN AMERICAN): >60 ML/MIN/1.73 M^2
EST. GFR  (NON AFRICAN AMERICAN): >60 ML/MIN/1.73 M^2
ETHANOL SERPL-MCNC: <10 MG/DL
GLUCOSE SERPL-MCNC: 111 MG/DL (ref 70–110)
GLUCOSE UR QL STRIP: NEGATIVE
HCT VFR BLD AUTO: 37.4 % (ref 37–48.5)
HGB BLD-MCNC: 11.4 G/DL (ref 12–16)
HGB UR QL STRIP: NEGATIVE
IMM GRANULOCYTES # BLD AUTO: 0.04 K/UL (ref 0–0.04)
IMM GRANULOCYTES NFR BLD AUTO: 0.6 % (ref 0–0.5)
KETONES UR QL STRIP: NEGATIVE
LEUKOCYTE ESTERASE UR QL STRIP: ABNORMAL
LYMPHOCYTES # BLD AUTO: 1.5 K/UL (ref 1–4.8)
LYMPHOCYTES NFR BLD: 23.7 % (ref 18–48)
MCH RBC QN AUTO: 28.9 PG (ref 27–31)
MCHC RBC AUTO-ENTMCNC: 30.5 G/DL (ref 32–36)
MCV RBC AUTO: 95 FL (ref 82–98)
METHADONE UR QL SCN>300 NG/ML: NEGATIVE
MICROSCOPIC COMMENT: ABNORMAL
MONOCYTES # BLD AUTO: 0.5 K/UL (ref 0.3–1)
MONOCYTES NFR BLD: 7.3 % (ref 4–15)
NEUTROPHILS # BLD AUTO: 4.3 K/UL (ref 1.8–7.7)
NEUTROPHILS NFR BLD: 67.3 % (ref 38–73)
NITRITE UR QL STRIP: NEGATIVE
NRBC BLD-RTO: 0 /100 WBC
OPIATES UR QL SCN: NEGATIVE
PCP UR QL SCN>25 NG/ML: NEGATIVE
PH UR STRIP: 7 [PH] (ref 5–8)
PLATELET # BLD AUTO: 242 K/UL (ref 150–350)
PMV BLD AUTO: 10.9 FL (ref 9.2–12.9)
POTASSIUM SERPL-SCNC: 4 MMOL/L (ref 3.5–5.1)
PROT SERPL-MCNC: 7.6 G/DL (ref 6–8.4)
PROT UR QL STRIP: NEGATIVE
RBC # BLD AUTO: 3.94 M/UL (ref 4–5.4)
RBC #/AREA URNS AUTO: 1 /HPF (ref 0–4)
SARS-COV-2 RDRP RESP QL NAA+PROBE: NEGATIVE
SODIUM SERPL-SCNC: 146 MMOL/L (ref 136–145)
SP GR UR STRIP: 1.02 (ref 1–1.03)
SQUAMOUS #/AREA URNS AUTO: 1 /HPF
TOXICOLOGY INFORMATION: NORMAL
TSH SERPL DL<=0.005 MIU/L-ACNC: 2.33 UIU/ML (ref 0.4–4)
URN SPEC COLLECT METH UR: ABNORMAL
WBC # BLD AUTO: 6.32 K/UL (ref 3.9–12.7)
WBC #/AREA URNS AUTO: 13 /HPF (ref 0–5)

## 2020-11-30 PROCEDURE — 99285 EMERGENCY DEPT VISIT HI MDM: CPT | Mod: CS,,, | Performed by: PHYSICIAN ASSISTANT

## 2020-11-30 PROCEDURE — 99285 PR EMERGENCY DEPT VISIT,LEVEL V: ICD-10-PCS | Mod: CS,,, | Performed by: PHYSICIAN ASSISTANT

## 2020-11-30 PROCEDURE — U0002 COVID-19 LAB TEST NON-CDC: HCPCS | Performed by: PHYSICIAN ASSISTANT

## 2020-11-30 PROCEDURE — 87086 URINE CULTURE/COLONY COUNT: CPT

## 2020-11-30 PROCEDURE — 81001 URINALYSIS AUTO W/SCOPE: CPT

## 2020-11-30 PROCEDURE — 80320 DRUG SCREEN QUANTALCOHOLS: CPT

## 2020-11-30 PROCEDURE — 99285 EMERGENCY DEPT VISIT HI MDM: CPT | Mod: 25

## 2020-11-30 PROCEDURE — 80329 ANALGESICS NON-OPIOID 1 OR 2: CPT

## 2020-11-30 PROCEDURE — 84443 ASSAY THYROID STIM HORMONE: CPT

## 2020-11-30 PROCEDURE — 80053 COMPREHEN METABOLIC PANEL: CPT

## 2020-11-30 PROCEDURE — 85025 COMPLETE CBC W/AUTO DIFF WBC: CPT

## 2020-11-30 PROCEDURE — 80307 DRUG TEST PRSMV CHEM ANLYZR: CPT

## 2020-12-01 VITALS
TEMPERATURE: 98 F | SYSTOLIC BLOOD PRESSURE: 132 MMHG | BODY MASS INDEX: 24.23 KG/M2 | DIASTOLIC BLOOD PRESSURE: 68 MMHG | RESPIRATION RATE: 16 BRPM | HEART RATE: 66 BPM | OXYGEN SATURATION: 99 % | HEIGHT: 66 IN

## 2020-12-01 PROCEDURE — 25000003 PHARM REV CODE 250: Performed by: PHYSICIAN ASSISTANT

## 2020-12-01 RX ORDER — AMLODIPINE BESYLATE 5 MG/1
5 TABLET ORAL
Status: COMPLETED | OUTPATIENT
Start: 2020-12-01 | End: 2020-12-01

## 2020-12-01 RX ORDER — HALOPERIDOL 5 MG/ML
5 INJECTION INTRAMUSCULAR 2 TIMES DAILY PRN
Status: DISCONTINUED | OUTPATIENT
Start: 2020-12-01 | End: 2020-12-01 | Stop reason: HOSPADM

## 2020-12-01 RX ADMIN — AMLODIPINE BESYLATE 5 MG: 5 TABLET ORAL at 07:12

## 2020-12-01 NOTE — ED NOTES
Pt escorted off of the unit to the [facility] transportation vehicle. Pt belongings and PEC given to [facility] staff. Pt remained calm and cooperative for the transfer.

## 2020-12-01 NOTE — ED NOTES
Received a call from pt's son Chidi, he was informed of his mothers disposition and pending transfer.

## 2020-12-01 NOTE — ED NOTES
Patient calm and cooperative. Oriented to person. Does not remember why she is in hospital. DVC maintained. Denies SI and HI. Will continue to monitor.
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
## 2020-12-01 NOTE — ED NOTES
Pt is up ambulating throughout the [facility]ERE. Pt is confused and oriented to person only. Pt informed of her situation date and time. Pt is calm and cooperative but unable to comprehend the situation.

## 2020-12-01 NOTE — ED NOTES
Pt report called to Wendy at [facility]. Attempts made to contact pt's son Chidi of her pending transfer no answer on the number listed. [place] at [facility] notified of pt placement.

## 2020-12-01 NOTE — ED NOTES
Miranda Houston is in room # A [facility] 3, was dressed in paper scrubs. The patient is unable to sign her mental health rights. Rights signed by RN x 2 and placed in chart. All the patient's belongings have been removed, labeled and locked in the belonging's closet. The PEC has been completed, signed, dated and placed in the patient's chart. There is a sitter at the bedside with direct visual contact doing 15 minute observations and they have no belongings in the room. If family/vistors are present, they have also been made aware of the no belongings policy and have stated their understanding. The SADD tool was done on intake. The transfer sheet should be signed upon the actual transfer. The Psych hold orders have been signed, dated and placed in the chart. Vital signs are being done per orders.The psych resident has been notified.

## 2020-12-01 NOTE — PROVIDER PROGRESS NOTES - EMERGENCY DEPT.
Encounter Date: 11/30/2020    ED Physician Progress Notes             I assumed care of this patient at shift change from the outgoing physician assistant.  The patient has been placed under a pec.  At 9:20 p.m. she is medically cleared for transfer for mental health placement, although we are still waiting on the pt to provide a urine specimen.

## 2020-12-01 NOTE — ED NOTES
Patient transferred to [facility] With ER staff and security with 1 bag of belongings.  called and informed of transfer to  1. Spoke with Claudia. DVC maintained. PEC with Patient.
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
## 2020-12-02 LAB — BACTERIA UR CULT: NO GROWTH

## 2020-12-18 ENCOUNTER — LAB VISIT (OUTPATIENT)
Dept: LAB | Facility: OTHER | Age: 70
End: 2020-12-18
Payer: MEDICARE

## 2020-12-18 DIAGNOSIS — Z03.818 ENCOUNTER FOR OBSERVATION FOR SUSPECTED EXPOSURE TO OTHER BIOLOGICAL AGENTS RULED OUT: ICD-10-CM

## 2020-12-18 PROCEDURE — U0003 INFECTIOUS AGENT DETECTION BY NUCLEIC ACID (DNA OR RNA); SEVERE ACUTE RESPIRATORY SYNDROME CORONAVIRUS 2 (SARS-COV-2) (CORONAVIRUS DISEASE [COVID-19]), AMPLIFIED PROBE TECHNIQUE, MAKING USE OF HIGH THROUGHPUT TECHNOLOGIES AS DESCRIBED BY CMS-2020-01-R: HCPCS

## 2020-12-20 DIAGNOSIS — U07.1 COVID-19 VIRUS DETECTED: ICD-10-CM

## 2020-12-20 LAB — SARS-COV-2 RNA RESP QL NAA+PROBE: DETECTED

## 2020-12-23 ENCOUNTER — LAB VISIT (OUTPATIENT)
Dept: LAB | Facility: OTHER | Age: 70
End: 2020-12-23
Payer: MEDICARE

## 2020-12-23 DIAGNOSIS — Z03.818 ENCOUNTER FOR OBSERVATION FOR SUSPECTED EXPOSURE TO OTHER BIOLOGICAL AGENTS RULED OUT: ICD-10-CM

## 2020-12-23 PROCEDURE — U0003 INFECTIOUS AGENT DETECTION BY NUCLEIC ACID (DNA OR RNA); SEVERE ACUTE RESPIRATORY SYNDROME CORONAVIRUS 2 (SARS-COV-2) (CORONAVIRUS DISEASE [COVID-19]), AMPLIFIED PROBE TECHNIQUE, MAKING USE OF HIGH THROUGHPUT TECHNOLOGIES AS DESCRIBED BY CMS-2020-01-R: HCPCS

## 2020-12-25 LAB — SARS-COV-2 RNA RESP QL NAA+PROBE: DETECTED

## 2021-01-14 ENCOUNTER — LAB VISIT (OUTPATIENT)
Dept: LAB | Facility: OTHER | Age: 71
End: 2021-01-14
Attending: INTERNAL MEDICINE
Payer: MEDICARE

## 2021-01-14 DIAGNOSIS — Z20.822 ENCOUNTER FOR LABORATORY TESTING FOR COVID-19 VIRUS: ICD-10-CM

## 2021-01-14 PROCEDURE — U0003 INFECTIOUS AGENT DETECTION BY NUCLEIC ACID (DNA OR RNA); SEVERE ACUTE RESPIRATORY SYNDROME CORONAVIRUS 2 (SARS-COV-2) (CORONAVIRUS DISEASE [COVID-19]), AMPLIFIED PROBE TECHNIQUE, MAKING USE OF HIGH THROUGHPUT TECHNOLOGIES AS DESCRIBED BY CMS-2020-01-R: HCPCS

## 2021-01-15 LAB — SARS-COV-2 RNA RESP QL NAA+PROBE: NOT DETECTED

## 2021-03-05 ENCOUNTER — PATIENT OUTREACH (OUTPATIENT)
Dept: ADMINISTRATIVE | Facility: HOSPITAL | Age: 71
End: 2021-03-05

## 2021-03-25 ENCOUNTER — HOSPITAL ENCOUNTER (EMERGENCY)
Facility: HOSPITAL | Age: 71
Discharge: HOME | End: 2021-03-25
Attending: EMERGENCY MEDICINE
Payer: MEDICARE

## 2021-03-25 ENCOUNTER — PATIENT OUTREACH (OUTPATIENT)
Dept: EMERGENCY MEDICINE | Facility: HOSPITAL | Age: 71
End: 2021-03-25

## 2021-03-25 VITALS
HEIGHT: 66 IN | WEIGHT: 145 LBS | DIASTOLIC BLOOD PRESSURE: 77 MMHG | TEMPERATURE: 99 F | RESPIRATION RATE: 18 BRPM | BODY MASS INDEX: 23.3 KG/M2 | SYSTOLIC BLOOD PRESSURE: 134 MMHG | HEART RATE: 74 BPM | OXYGEN SATURATION: 97 %

## 2021-03-25 DIAGNOSIS — Z87.2: Primary | ICD-10-CM

## 2021-03-25 DIAGNOSIS — R21 RASH OF FACE: ICD-10-CM

## 2021-03-25 PROCEDURE — 25000003 PHARM REV CODE 250: Performed by: PHYSICIAN ASSISTANT

## 2021-03-25 PROCEDURE — 99284 PR EMERGENCY DEPT VISIT,LEVEL IV: ICD-10-PCS | Mod: ,,, | Performed by: PHYSICIAN ASSISTANT

## 2021-03-25 PROCEDURE — 99283 EMERGENCY DEPT VISIT LOW MDM: CPT

## 2021-03-25 PROCEDURE — 99284 EMERGENCY DEPT VISIT MOD MDM: CPT | Mod: ,,, | Performed by: PHYSICIAN ASSISTANT

## 2021-03-25 RX ORDER — MUPIROCIN 20 MG/G
OINTMENT TOPICAL ONCE
Status: COMPLETED | OUTPATIENT
Start: 2021-03-25 | End: 2021-03-25

## 2021-03-25 RX ORDER — MUPIROCIN 20 MG/G
OINTMENT TOPICAL 2 TIMES DAILY
Qty: 30 G | Refills: 0 | Status: SHIPPED | OUTPATIENT
Start: 2021-03-25

## 2021-03-25 RX ADMIN — MUPIROCIN: 20 OINTMENT TOPICAL at 11:03

## 2021-05-06 ENCOUNTER — HOSPITAL ENCOUNTER (EMERGENCY)
Facility: HOSPITAL | Age: 71
Discharge: HOME | End: 2021-05-06
Attending: EMERGENCY MEDICINE
Payer: MEDICARE

## 2021-05-06 VITALS
BODY MASS INDEX: 23.31 KG/M2 | SYSTOLIC BLOOD PRESSURE: 155 MMHG | HEIGHT: 66 IN | TEMPERATURE: 98 F | WEIGHT: 145.06 LBS | DIASTOLIC BLOOD PRESSURE: 78 MMHG | OXYGEN SATURATION: 97 % | RESPIRATION RATE: 18 BRPM | HEART RATE: 75 BPM

## 2021-05-06 DIAGNOSIS — R07.9 CHEST PAIN: ICD-10-CM

## 2021-05-06 DIAGNOSIS — R07.9 CHEST PAIN, UNSPECIFIED TYPE: Primary | ICD-10-CM

## 2021-05-06 LAB
ALBUMIN SERPL BCP-MCNC: 3.3 G/DL (ref 3.5–5.2)
ALP SERPL-CCNC: 75 U/L (ref 55–135)
ALT SERPL W/O P-5'-P-CCNC: 31 U/L (ref 10–44)
ANION GAP SERPL CALC-SCNC: 4 MMOL/L (ref 8–16)
AST SERPL-CCNC: 31 U/L (ref 10–40)
BASOPHILS # BLD AUTO: 0.03 K/UL (ref 0–0.2)
BASOPHILS NFR BLD: 0.5 % (ref 0–1.9)
BILIRUB SERPL-MCNC: 0.2 MG/DL (ref 0.1–1)
BNP SERPL-MCNC: <10 PG/ML (ref 0–99)
BUN SERPL-MCNC: 20 MG/DL (ref 8–23)
CALCIUM SERPL-MCNC: 9.6 MG/DL (ref 8.7–10.5)
CHLORIDE SERPL-SCNC: 103 MMOL/L (ref 95–110)
CO2 SERPL-SCNC: 26 MMOL/L (ref 23–29)
CREAT SERPL-MCNC: 1 MG/DL (ref 0.5–1.4)
DIFFERENTIAL METHOD: NORMAL
EOSINOPHIL # BLD AUTO: 0 K/UL (ref 0–0.5)
EOSINOPHIL NFR BLD: 0.4 % (ref 0–8)
ERYTHROCYTE [DISTWIDTH] IN BLOOD BY AUTOMATED COUNT: 14 % (ref 11.5–14.5)
EST. GFR  (AFRICAN AMERICAN): >60 ML/MIN/1.73 M^2
EST. GFR  (NON AFRICAN AMERICAN): 57.2 ML/MIN/1.73 M^2
GLUCOSE SERPL-MCNC: 84 MG/DL (ref 70–110)
HCT VFR BLD AUTO: 40 % (ref 37–48.5)
HGB BLD-MCNC: 12.8 G/DL (ref 12–16)
IMM GRANULOCYTES # BLD AUTO: 0.02 K/UL (ref 0–0.04)
IMM GRANULOCYTES NFR BLD AUTO: 0.4 % (ref 0–0.5)
LYMPHOCYTES # BLD AUTO: 1.7 K/UL (ref 1–4.8)
LYMPHOCYTES NFR BLD: 29.9 % (ref 18–48)
MCH RBC QN AUTO: 30.9 PG (ref 27–31)
MCHC RBC AUTO-ENTMCNC: 32 G/DL (ref 32–36)
MCV RBC AUTO: 97 FL (ref 82–98)
MONOCYTES # BLD AUTO: 0.6 K/UL (ref 0.3–1)
MONOCYTES NFR BLD: 10.3 % (ref 4–15)
NEUTROPHILS # BLD AUTO: 3.2 K/UL (ref 1.8–7.7)
NEUTROPHILS NFR BLD: 58.5 % (ref 38–73)
NRBC BLD-RTO: 0 /100 WBC
PLATELET # BLD AUTO: 288 K/UL (ref 150–450)
PMV BLD AUTO: 10.5 FL (ref 9.2–12.9)
POTASSIUM SERPL-SCNC: 4 MMOL/L (ref 3.5–5.1)
PROT SERPL-MCNC: 7.6 G/DL (ref 6–8.4)
RBC # BLD AUTO: 4.14 M/UL (ref 4–5.4)
SODIUM SERPL-SCNC: 133 MMOL/L (ref 136–145)
TROPONIN I SERPL DL<=0.01 NG/ML-MCNC: <0.006 NG/ML (ref 0–0.03)
WBC # BLD AUTO: 5.51 K/UL (ref 3.9–12.7)

## 2021-05-06 PROCEDURE — 93005 ELECTROCARDIOGRAM TRACING: CPT

## 2021-05-06 PROCEDURE — 99285 EMERGENCY DEPT VISIT HI MDM: CPT | Mod: 25

## 2021-05-06 PROCEDURE — 85025 COMPLETE CBC W/AUTO DIFF WBC: CPT | Performed by: EMERGENCY MEDICINE

## 2021-05-06 PROCEDURE — 84484 ASSAY OF TROPONIN QUANT: CPT | Performed by: EMERGENCY MEDICINE

## 2021-05-06 PROCEDURE — 93010 EKG 12-LEAD: ICD-10-PCS | Mod: ,,, | Performed by: INTERNAL MEDICINE

## 2021-05-06 PROCEDURE — 93010 ELECTROCARDIOGRAM REPORT: CPT | Mod: 76,,, | Performed by: INTERNAL MEDICINE

## 2021-05-06 PROCEDURE — 83880 ASSAY OF NATRIURETIC PEPTIDE: CPT | Performed by: EMERGENCY MEDICINE

## 2021-05-06 PROCEDURE — 99284 PR EMERGENCY DEPT VISIT,LEVEL IV: ICD-10-PCS | Mod: ,,, | Performed by: EMERGENCY MEDICINE

## 2021-05-06 PROCEDURE — 80053 COMPREHEN METABOLIC PANEL: CPT | Performed by: EMERGENCY MEDICINE

## 2021-05-06 PROCEDURE — 93010 ELECTROCARDIOGRAM REPORT: CPT | Mod: ,,, | Performed by: INTERNAL MEDICINE

## 2021-05-06 PROCEDURE — 99284 EMERGENCY DEPT VISIT MOD MDM: CPT | Mod: ,,, | Performed by: EMERGENCY MEDICINE

## 2021-07-06 ENCOUNTER — PATIENT MESSAGE (OUTPATIENT)
Dept: ADMINISTRATIVE | Facility: HOSPITAL | Age: 71
End: 2021-07-06

## 2021-07-26 ENCOUNTER — LAB VISIT (OUTPATIENT)
Dept: LAB | Facility: OTHER | Age: 71
End: 2021-07-26
Payer: MEDICARE

## 2021-07-26 DIAGNOSIS — Z20.822 ENCOUNTER FOR LABORATORY TESTING FOR COVID-19 VIRUS: ICD-10-CM

## 2021-07-26 PROCEDURE — U0003 INFECTIOUS AGENT DETECTION BY NUCLEIC ACID (DNA OR RNA); SEVERE ACUTE RESPIRATORY SYNDROME CORONAVIRUS 2 (SARS-COV-2) (CORONAVIRUS DISEASE [COVID-19]), AMPLIFIED PROBE TECHNIQUE, MAKING USE OF HIGH THROUGHPUT TECHNOLOGIES AS DESCRIBED BY CMS-2020-01-R: HCPCS | Performed by: NURSE PRACTITIONER

## 2021-07-28 LAB
SARS-COV-2 RNA RESP QL NAA+PROBE: NOT DETECTED
SARS-COV-2- CYCLE NUMBER: -1

## 2021-08-02 ENCOUNTER — LAB VISIT (OUTPATIENT)
Dept: LAB | Facility: OTHER | Age: 71
End: 2021-08-02
Payer: MEDICARE

## 2021-08-02 DIAGNOSIS — Z20.822 ENCOUNTER FOR LABORATORY TESTING FOR COVID-19 VIRUS: ICD-10-CM

## 2021-08-02 PROCEDURE — U0003 INFECTIOUS AGENT DETECTION BY NUCLEIC ACID (DNA OR RNA); SEVERE ACUTE RESPIRATORY SYNDROME CORONAVIRUS 2 (SARS-COV-2) (CORONAVIRUS DISEASE [COVID-19]), AMPLIFIED PROBE TECHNIQUE, MAKING USE OF HIGH THROUGHPUT TECHNOLOGIES AS DESCRIBED BY CMS-2020-01-R: HCPCS | Performed by: NURSE PRACTITIONER

## 2021-08-04 LAB
SARS-COV-2 RNA RESP QL NAA+PROBE: NOT DETECTED
SARS-COV-2- CYCLE NUMBER: -1

## 2021-08-23 ENCOUNTER — LAB VISIT (OUTPATIENT)
Dept: LAB | Facility: OTHER | Age: 71
End: 2021-08-23
Payer: MEDICARE

## 2021-08-23 DIAGNOSIS — Z20.822 ENCOUNTER FOR LABORATORY TESTING FOR COVID-19 VIRUS: ICD-10-CM

## 2021-08-23 PROCEDURE — U0003 INFECTIOUS AGENT DETECTION BY NUCLEIC ACID (DNA OR RNA); SEVERE ACUTE RESPIRATORY SYNDROME CORONAVIRUS 2 (SARS-COV-2) (CORONAVIRUS DISEASE [COVID-19]), AMPLIFIED PROBE TECHNIQUE, MAKING USE OF HIGH THROUGHPUT TECHNOLOGIES AS DESCRIBED BY CMS-2020-01-R: HCPCS | Performed by: NURSE PRACTITIONER

## 2021-08-26 LAB — SARS-COV-2 RNA RESP QL NAA+PROBE: NOT DETECTED

## 2021-09-13 ENCOUNTER — LAB VISIT (OUTPATIENT)
Dept: LAB | Facility: OTHER | Age: 71
End: 2021-09-13
Payer: MEDICARE

## 2021-09-13 DIAGNOSIS — Z20.822 ENCOUNTER FOR LABORATORY TESTING FOR COVID-19 VIRUS: ICD-10-CM

## 2021-09-13 PROCEDURE — U0003 INFECTIOUS AGENT DETECTION BY NUCLEIC ACID (DNA OR RNA); SEVERE ACUTE RESPIRATORY SYNDROME CORONAVIRUS 2 (SARS-COV-2) (CORONAVIRUS DISEASE [COVID-19]), AMPLIFIED PROBE TECHNIQUE, MAKING USE OF HIGH THROUGHPUT TECHNOLOGIES AS DESCRIBED BY CMS-2020-01-R: HCPCS | Performed by: NURSE PRACTITIONER

## 2021-09-15 LAB
SARS-COV-2 RNA RESP QL NAA+PROBE: NOT DETECTED
SARS-COV-2- CYCLE NUMBER: NORMAL

## 2021-09-20 ENCOUNTER — LAB VISIT (OUTPATIENT)
Dept: LAB | Facility: OTHER | Age: 71
End: 2021-09-20
Payer: MEDICARE

## 2021-09-20 DIAGNOSIS — Z20.822 ENCOUNTER FOR LABORATORY TESTING FOR COVID-19 VIRUS: ICD-10-CM

## 2021-09-20 PROCEDURE — U0003 INFECTIOUS AGENT DETECTION BY NUCLEIC ACID (DNA OR RNA); SEVERE ACUTE RESPIRATORY SYNDROME CORONAVIRUS 2 (SARS-COV-2) (CORONAVIRUS DISEASE [COVID-19]), AMPLIFIED PROBE TECHNIQUE, MAKING USE OF HIGH THROUGHPUT TECHNOLOGIES AS DESCRIBED BY CMS-2020-01-R: HCPCS | Performed by: NURSE PRACTITIONER

## 2021-09-21 LAB
SARS-COV-2 RNA RESP QL NAA+PROBE: NOT DETECTED
SARS-COV-2- CYCLE NUMBER: NORMAL

## 2021-10-04 ENCOUNTER — PATIENT MESSAGE (OUTPATIENT)
Dept: ADMINISTRATIVE | Facility: HOSPITAL | Age: 71
End: 2021-10-04

## 2021-12-27 ENCOUNTER — LAB VISIT (OUTPATIENT)
Dept: LAB | Facility: OTHER | Age: 71
End: 2021-12-27
Payer: MEDICARE

## 2021-12-27 DIAGNOSIS — Z20.822 ENCOUNTER FOR LABORATORY TESTING FOR COVID-19 VIRUS: ICD-10-CM

## 2021-12-27 PROCEDURE — U0003 INFECTIOUS AGENT DETECTION BY NUCLEIC ACID (DNA OR RNA); SEVERE ACUTE RESPIRATORY SYNDROME CORONAVIRUS 2 (SARS-COV-2) (CORONAVIRUS DISEASE [COVID-19]), AMPLIFIED PROBE TECHNIQUE, MAKING USE OF HIGH THROUGHPUT TECHNOLOGIES AS DESCRIBED BY CMS-2020-01-R: HCPCS | Performed by: NURSE PRACTITIONER

## 2021-12-28 LAB
SARS-COV-2 RNA RESP QL NAA+PROBE: NOT DETECTED
SARS-COV-2- CYCLE NUMBER: NORMAL

## 2022-01-03 ENCOUNTER — LAB VISIT (OUTPATIENT)
Dept: LAB | Facility: OTHER | Age: 72
End: 2022-01-03
Payer: MEDICARE

## 2022-01-03 DIAGNOSIS — Z20.822 ENCOUNTER FOR LABORATORY TESTING FOR COVID-19 VIRUS: ICD-10-CM

## 2022-01-03 PROCEDURE — U0003 INFECTIOUS AGENT DETECTION BY NUCLEIC ACID (DNA OR RNA); SEVERE ACUTE RESPIRATORY SYNDROME CORONAVIRUS 2 (SARS-COV-2) (CORONAVIRUS DISEASE [COVID-19]), AMPLIFIED PROBE TECHNIQUE, MAKING USE OF HIGH THROUGHPUT TECHNOLOGIES AS DESCRIBED BY CMS-2020-01-R: HCPCS | Performed by: NURSE PRACTITIONER

## 2022-01-06 LAB
SARS-COV-2 RNA RESP QL NAA+PROBE: NOT DETECTED
SARS-COV-2- CYCLE NUMBER: NORMAL

## 2022-01-10 ENCOUNTER — LAB VISIT (OUTPATIENT)
Dept: LAB | Facility: OTHER | Age: 72
End: 2022-01-10
Payer: MEDICARE

## 2022-01-10 DIAGNOSIS — Z20.822 ENCOUNTER FOR LABORATORY TESTING FOR COVID-19 VIRUS: ICD-10-CM

## 2022-01-10 PROCEDURE — U0003 INFECTIOUS AGENT DETECTION BY NUCLEIC ACID (DNA OR RNA); SEVERE ACUTE RESPIRATORY SYNDROME CORONAVIRUS 2 (SARS-COV-2) (CORONAVIRUS DISEASE [COVID-19]), AMPLIFIED PROBE TECHNIQUE, MAKING USE OF HIGH THROUGHPUT TECHNOLOGIES AS DESCRIBED BY CMS-2020-01-R: HCPCS | Performed by: NURSE PRACTITIONER

## 2022-01-11 DIAGNOSIS — U07.1 COVID-19 VIRUS DETECTED: ICD-10-CM

## 2022-01-11 LAB
SARS-COV-2 RNA RESP QL NAA+PROBE: DETECTED
SARS-COV-2- CYCLE NUMBER: 26

## 2022-01-25 ENCOUNTER — PATIENT MESSAGE (OUTPATIENT)
Dept: ADMINISTRATIVE | Facility: HOSPITAL | Age: 72
End: 2022-01-25
Payer: MEDICARE

## 2022-03-16 ENCOUNTER — PATIENT MESSAGE (OUTPATIENT)
Dept: ADMINISTRATIVE | Facility: HOSPITAL | Age: 72
End: 2022-03-16
Payer: MEDICARE

## 2023-03-10 ENCOUNTER — HOSPITAL ENCOUNTER (EMERGENCY)
Facility: HOSPITAL | Age: 73
Discharge: HOME | End: 2023-03-10
Attending: EMERGENCY MEDICINE
Payer: MEDICARE

## 2023-03-10 VITALS
RESPIRATION RATE: 15 BRPM | DIASTOLIC BLOOD PRESSURE: 91 MMHG | HEART RATE: 91 BPM | OXYGEN SATURATION: 98 % | TEMPERATURE: 99 F | SYSTOLIC BLOOD PRESSURE: 161 MMHG

## 2023-03-10 DIAGNOSIS — S02.31XA CLOSED FRACTURE OF RIGHT ORBITAL FLOOR, INITIAL ENCOUNTER: Primary | ICD-10-CM

## 2023-03-10 PROCEDURE — 99284 EMERGENCY DEPT VISIT MOD MDM: CPT | Mod: ,,, | Performed by: EMERGENCY MEDICINE

## 2023-03-10 PROCEDURE — 99284 PR EMERGENCY DEPT VISIT,LEVEL IV: ICD-10-PCS | Mod: ,,, | Performed by: EMERGENCY MEDICINE

## 2023-03-10 PROCEDURE — 99284 EMERGENCY DEPT VISIT MOD MDM: CPT | Mod: 25

## 2023-03-10 RX ORDER — CEPHALEXIN 500 MG/1
500 CAPSULE ORAL 4 TIMES DAILY
Qty: 28 CAPSULE | Refills: 0 | Status: SHIPPED | OUTPATIENT
Start: 2023-03-10 | End: 2023-03-17

## 2023-03-10 NOTE — ASSESSMENT & PLAN NOTE
72 y.o F with R orbital floor fracture, unclear etiology. HPI and exam limited 2/2 dementia but EOM appears to be intact. Suspect some degree of V2 numbness based on fracture line but unable to assess on exam.     - Agree with ophtho evaluation  - No acute ENT interventions at this time   - Keflex   - Sinonasal precautions   - No nose blowing, sneeze with mouth open   - Ice to R eye for swelling   - Will arrange follow up with Dr. Reynoso to reassess

## 2023-03-10 NOTE — ED NOTES
MRN:[id number]                      After Visit Summary   10/30/2017    Bhaskar Ott    MRN: [id number]           Visit Information        Provider Department      10/30/2017 9:45 AM MG CV TECH; MG PC CARD; MG IMAGING NURSE; MG STRESS RM [facility]        Your next 10 appointments already scheduled     Oct 30, 2017 10:15 AM CDT   NM MPI WITH LEXISCAN with MGNM1   [facility]    [address] [place] [id number] [phone]           For a ONE day exam: Allow 3-4 hours for test. For a TWO day exam: Allow 2 hours PER day for test.  You may need to stop some medicines before the test. Follow your doctor s orders. - If you take a beta blocker: Follow your doctor s specific instructions on taking it prior to and on the day of your exam. - If you take Aggrenox or dipyridamole (Persantine, Permole), stop taking it 48 hours before your test. - If you take Viagra, Cialis or Levitra, stop taking it 48 hours before your test. - If you take theophylline or aminophylline, stop taking it 12 hours before your test.  For patients with diabetes: - If you take insulin, call your diabetes care team. Ask if you should take a 1/2 dose the morning of your test. - If you take diabetes medicine by mouth, don t take it on the morning of your test. Bring it with you to take after the test. (If you have questions, call your diabetes care team.)  Do not take nitrates on the day of your test. Do not wear your Nitro-Patch.  Stop all caffeine 12 hours before the test. This includes coffee, tea, soda pop, chocolate and certain medicines (such as Anacin, Excedrin and NoDoz). Also avoid decaf coffee and tea, as these contain small amounts of caffeine.  No alcohol, smoking or other tobacco for 12 hours before the test.  Stop eating 3 hours before the test. You may drink water.  Please wear a loose two-piece outfit. If you will have an exercise  Bed: [facility]  Expected date:   Expected time:   Means of arrival:   Comments:   test, bring rubber-soled walking shoes.  When you arrive, please tell us if you: - Have diabetes - Are breastfeeding - May be pregnant - Have a pacemaker of ICD (implantable defibrillator).  Please call your Imaging Department at your exam site with any questions.            2018 10:50 AM CDT   XR THORACIC/LUMBAR STANDING 2 VIEWS (SCOLI) with UCXR4   [facility] ([facility])    [address]  1st Floor  [place] [address] [phone]           Please bring a list of your current medicines to your exam. (Include vitamins, minerals and over-thecounter medicines.) Leave your valuables at home.  Tell your doctor if there is a chance you may be pregnant.  You do not need to do anything special for this exam.            2018 11:15 AM CDT   (Arrive by 11:00 AM)   Return Visit with Saul Bettencourt MD   [facility] ([facility])    [address] [place] [id number] [phone]              MyChart Information     My Team Zone is an electronic gateway that provides easy, online access to your medical records. With My Team Zone, you can request a clinic appointment, read your test results, renew a prescription or communicate with your care team.     To sign up for [email or web address] visit the website at [email or web address]   You will be asked to enter the access code listed below, as well as some personal information. Please follow the directions to create your username and password.     Your access code is: [email or web address]  Expires: 2018  9:52 AM     Your access code will  in 90 days. If you need help or a new code, please contact your [facility] or call [phone] for assistance.        Care EveryWhere ID     This is your Care EveryWhere ID. This could be used by other organizations to access your [place] medical records  [id number]        Equal Access to  Services     [facility]: Marcos Linder, waannamariada luqadaha, qaybta kaallaurie gomez, alesia coronado. [facility] [phone].    ATENCIÓN: Si habla español, tiene a bhatti disposición servicios gratuitos de asistencia lingüística. Llame al [phone].    We comply with applicable federal civil rights laws and [place] laws. We do not discriminate on the basis of race, color, national origin, age, disability, sex, sexual orientation, or gender identity.

## 2023-03-10 NOTE — CONSULTS
Consultation Report  Ophthalmology Service    Date: 03/10/2023    Chief complaint/Reason for Consult: concern for inferior rectus entrapment 2/2 right orbital floor fx     History of Present Illness: Miranda Houston is a 72 y.o. female with unknown POHx  who presents with right orbital floor fractures and fall.  Patient has severe alzheimers dementia, presented from NH with unwitnessed fall onto the right face.  Patient is not alert or oriented, only knows her name. She does not follow commands, does not answer questions appropriately otherwise.  She denies having eye pain but cannot trust answers.      POcularHx: unknown    Current eye gtts:       Family Hx:  family history includes Cancer in her mother; Cancer (age of onset: 70) in her father; Diabetes in her sister; Heart disease in her mother; Hypertension in her mother and sister; Seizures in her brother.     PMHx:  has a past medical history of Chronic rhinitis, Colon polyps, Hyperlipidemia, Hypertension, Pyoderma gangrenosum, and Steroid-induced diabetes mellitus.     PSurgHx:  has a past surgical history that includes  section.     Home Medications:   Prior to Admission medications    Medication Sig Start Date End Date Taking? Authorizing Provider   amLODIPine (NORVASC) 5 MG tablet TAKE 1 TABLET ONE TIME DAILY FOR BLOOD PRESSURE 3/31/20   Sabino Calle MD   donepeziL (ARICEPT) 5 MG tablet Take 1 tablet (5 mg total) by mouth every morning. 20  Sabino Calle MD   econazole nitrate 1 % cream Apply topically once daily. 20   Sabino Calle MD   haloperidoL (HALDOL) 0.5 MG tablet Take 0.5 tablets (0.25 mg total) by mouth every morning. 20  Sabino Calle MD   hydrocortisone 2.5 % cream Apply topically 3 (three) times daily. 19   Sabino Calle MD   losartan (COZAAR) 50 MG tablet  20   Historical Provider   memantine (NAMENDA) 10 MG Tab Take 1 tablet (10 mg total) by mouth 2 (two) times daily. For  memory 6/1/20 6/1/21  Sabino Calle MD   mupirocin (BACTROBAN) 2 % ointment Apply topically 2 (two) times daily. 3/25/21   Bianca Aquino PA-C   QUEtiapine (SEROQUEL) 25 MG Tab Take 1 tablet (25 mg total) by mouth nightly. 4/26/20 4/26/21  Sabino Calle MD        Medications this encounter:     Allergies: is allergic to no known drug allergies.     Social:  reports that she quit smoking about 25 years ago. Her smoking use included cigarettes. She has a 1.75 pack-year smoking history. She has never used smokeless tobacco. She reports current alcohol use of about 7.0 standard drinks per week. She reports that she does not use drugs.     ROS: As per HPI    Ocular examination/Dilated fundus examination:  Base Eye Exam       Visual Acuity    ROBE, patient not alert or oriented, she will wince to bright light and will close eyes when approaching eyes but will not count fingers or read letters. Eyes do not wander.              Tonometry (Applanation, 3:55 PM)         Right Left    Pressure 18 24   Squeezing very hard             Pupils         Dark Light Shape React APD    Right 4 2 Round Brisk None    Left 4 2 Round Brisk None              Extraocular Movement         Right Left     Ortho Ortho     -- -- --   -2  --   -- -- --    -- -- --   --  -2   -- -1 --      Very poor cooperation, will not follow instructions, she does have spontaneous movemetns in all directions             Dilation       Both eyes: 1% Mydriacyl, 2.5% Phenylephrine @ 3:55 PM                  Slit Lamp and Fundus Exam       External Exam         Right Left    External Right periorbital abrasion and edema of rigth brow and upper/lower lid Normal              Slit Lamp Exam         Right Left    Lids/Lashes soft edema, ecchymosis Normal    Conjunctiva/Sclera Tr inj White and quiet    Cornea Clear Clear    Anterior Chamber Deep and quiet Deep and quiet    Iris Round and reactive Round and reactive    Lens NSC NSC    Anterior Vitreous Normal Normal               Fundus Exam         Right Left    Disc Normal Normal    C/D Ratio 0.8 0.8    Macula Normal Normal    Vessels Normal Normal    Periphery Normal Normal                  CT Max/face, Head, C spine  Impression:     1. Inferior right frontal/supraorbital scalp localized soft tissue swelling/contusion without displaced skull fracture or acute intracranial abnormality identified.  2. Right orbital floor fracture with extension to the infraorbital foramen, as above.  Correlate for potential neurovascular compromise and/or tethering/entrapment of the right inferior rectus muscle.  Ophthalmology consultation may be warranted.  3. CT evidence of cervical spine acute osseous traumatic injury.  4. Cervical spondylosis resulting in at most mild acquired canal stenosis with moderate right neural foraminal narrowing at C6-7 level, as further discussed above.  5. Few additional findings as above.    Assessment/Plan:     Orbital fracture, right eye  - No evidence of entrapment on imaging, EOM grossly intact but patient does not follow commands. severe alzheimers limits subjective aspect of exam,   - Globe intact - Kiel negative, IOP unremarkable given patient squeezing extensively, DFE negative for retinal damage  - No obvious entrapment on CT Max/face  - fracture management/eval per ENT      Angel Henriquez MD PGY-2  [employer] Ophthalmology Resident  03/10/2023  4:03 PM

## 2023-03-10 NOTE — PROVIDER PROGRESS NOTES - EMERGENCY DEPT.
ED Resident HAND-OFF NOTE:  4:28 PM 3/10/2023  Miranda Houston is a 72 y.o. female who presented to the ED on 3/10/2023 and has been managed by  and Dr. Sena, who reports patient C/O eye injury. I assumed care of patient from off-going ED physician team at 4:28 PM pending ENT and ophthalmology eval.    On my evaluation, Miranda Houston appears well and is hemodynamically stable. Thus far, Miranda Houston has received:  Medications - No data to display    On my exam, I appreciate:  BP (!) 159/89   Pulse 94   Temp 98.3 °F (36.8 °C) (Axillary)   Resp 14   SpO2 99%           Additional ED course:  4:29 PM  Discussed with ENT and.  There is no trapped exam, recommended Keflex and follow-up in ENT clinic    Disposition: Discharged  I have discussed and counseled Miranda Houston regarding exam, results, diagnosis, treatment, and plan.  ______________________  Lex Das MD   Emergency Medicine Resident  3/10/2023

## 2023-03-10 NOTE — SUBJECTIVE & OBJECTIVE
Medications:  Continuous Infusions:  Scheduled Meds:  PRN Meds:     No current facility-administered medications on file prior to encounter.     Current Outpatient Medications on File Prior to Encounter   Medication Sig    amLODIPine (NORVASC) 5 MG tablet TAKE 1 TABLET ONE TIME DAILY FOR BLOOD PRESSURE    donepeziL (ARICEPT) 5 MG tablet Take 1 tablet (5 mg total) by mouth every morning.    econazole nitrate 1 % cream Apply topically once daily.    haloperidoL (HALDOL) 0.5 MG tablet Take 0.5 tablets (0.25 mg total) by mouth every morning.    hydrocortisone 2.5 % cream Apply topically 3 (three) times daily.    losartan (COZAAR) 50 MG tablet     memantine (NAMENDA) 10 MG Tab Take 1 tablet (10 mg total) by mouth 2 (two) times daily. For memory    mupirocin (BACTROBAN) 2 % ointment Apply topically 2 (two) times daily.    QUEtiapine (SEROQUEL) 25 MG Tab Take 1 tablet (25 mg total) by mouth nightly.       Review of patient's allergies indicates:   Allergen Reactions    No known drug allergies        Past Medical History:   Diagnosis Date    Chronic rhinitis     Colon polyps     Hyperlipidemia     Hypertension     Pyoderma gangrenosum     left leg    Steroid-induced diabetes mellitus      Past Surgical History:   Procedure Laterality Date     SECTION      2 surgeries.     Family History       Problem Relation (Age of Onset)    Cancer Mother, Father (70)    Diabetes Sister    Heart disease Mother    Hypertension Mother, Sister    Seizures Brother          Tobacco Use    Smoking status: Former     Packs/day: 0.25     Years: 7.00     Pack years: 1.75     Types: Cigarettes     Quit date: 1997     Years since quittin.6    Smokeless tobacco: Never   Substance and Sexual Activity    Alcohol use: Yes     Alcohol/week: 7.0 standard drinks     Types: 7 Glasses of wine per week    Drug use: No    Sexual activity: Not Currently     Review of Systems   Unable to perform ROS: Dementia   Objective:     Vital Signs (Most  Recent):  Temp: 98.3 °F (36.8 °C) (03/10/23 1502)  Pulse: 94 (03/10/23 1502)  Resp: 14 (03/10/23 1502)  BP: (!) 159/89 (03/10/23 1502)  SpO2: 99 % (03/10/23 1502)   Vital Signs (24h Range):  Temp:  [98.1 °F (36.7 °C)-98.6 °F (37 °C)] 98.3 °F (36.8 °C)  Pulse:  [92-98] 94  Resp:  [14-18] 14  SpO2:  [99 %-100 %] 99 %  BP: (158-163)/(86-96) 159/89        There is no height or weight on file to calculate BMI.        Physical Exam  Constitutional:       General: She is not in acute distress.     Appearance: Normal appearance.   HENT:      Right Ear: External ear normal.      Left Ear: External ear normal.   Eyes:      Comments: R periorbital swelling   Superficial abrasion to R eyebrow   Patient does not follow commands but appears to have intact EOM bilaterally  Pupils dilated per ophtho      Neurological:      Mental Status: She is alert.      Comments: Unable to assess for CN V numbness        Significant Labs:  All pertinent labs from the last 24 hours have been reviewed.    Significant Diagnostics:  CT: I have reviewed all pertinent results/findings within the past 24 hours and my personal findings are:  R orbital floor fx, no evidence of muscle entrapment, able to visualize muscles tracts

## 2023-03-10 NOTE — CONSULTS
[facility] - Emergency Dept  Otorhinolaryngology-Head & Neck Surgery  Consult Note    Patient Name: Miranda Houston  MRN: [id number]  Code Status: No Order  Admission Date: 3/10/2023  Hospital Length of Stay: 0 days  Attending Physician: Cedric Camejo DO  Primary Care Provider: Sabino Calle MD    Patient information was obtained from ER records.     Inpatient consult to ENT  Consult performed by: Sandi Morgan MD  Consult ordered by: Aquiles Sena MD        Subjective:     Chief Complaint/Reason for Admission: R orbital floor fx     History of Present Illness: 72 y.o F with hx of dementia presents to ED for R eye swelling. HPI limited as patient does not answer questions or follow commands. CT scan was done which showed R orbital floor fracture, unclear etiology. ENT consulted for evaluation.       Medications:  Continuous Infusions:  Scheduled Meds:  PRN Meds:     No current facility-administered medications on file prior to encounter.     Current Outpatient Medications on File Prior to Encounter   Medication Sig    amLODIPine (NORVASC) 5 MG tablet TAKE 1 TABLET ONE TIME DAILY FOR BLOOD PRESSURE    donepeziL (ARICEPT) 5 MG tablet Take 1 tablet (5 mg total) by mouth every morning.    econazole nitrate 1 % cream Apply topically once daily.    haloperidoL (HALDOL) 0.5 MG tablet Take 0.5 tablets (0.25 mg total) by mouth every morning.    hydrocortisone 2.5 % cream Apply topically 3 (three) times daily.    losartan (COZAAR) 50 MG tablet     memantine (NAMENDA) 10 MG Tab Take 1 tablet (10 mg total) by mouth 2 (two) times daily. For memory    mupirocin (BACTROBAN) 2 % ointment Apply topically 2 (two) times daily.    QUEtiapine (SEROQUEL) 25 MG Tab Take 1 tablet (25 mg total) by mouth nightly.       Review of patient's allergies indicates:   Allergen Reactions    No known drug allergies        Past Medical History:   Diagnosis Date    Chronic rhinitis     Colon polyps     Hyperlipidemia      Hypertension     Pyoderma gangrenosum     left leg    Steroid-induced diabetes mellitus      Past Surgical History:   Procedure Laterality Date     SECTION      2 surgeries.     Family History       Problem Relation (Age of Onset)    Cancer Mother, Father (70)    Diabetes Sister    Heart disease Mother    Hypertension Mother, Sister    Seizures Brother          Tobacco Use    Smoking status: Former     Packs/day: 0.25     Years: 7.00     Pack years: 1.75     Types: Cigarettes     Quit date: 1997     Years since quittin.6    Smokeless tobacco: Never   Substance and Sexual Activity    Alcohol use: Yes     Alcohol/week: 7.0 standard drinks     Types: 7 Glasses of wine per week    Drug use: No    Sexual activity: Not Currently     Review of Systems   Unable to perform ROS: Dementia   Objective:     Vital Signs (Most Recent):  Temp: 98.3 °F (36.8 °C) (03/10/23 1502)  Pulse: 94 (03/10/23 1502)  Resp: 14 (03/10/23 1502)  BP: (!) 159/89 (03/10/23 1502)  SpO2: 99 % (03/10/23 1502)   Vital Signs (24h Range):  Temp:  [98.1 °F (36.7 °C)-98.6 °F (37 °C)] 98.3 °F (36.8 °C)  Pulse:  [92-98] 94  Resp:  [14-18] 14  SpO2:  [99 %-100 %] 99 %  BP: (158-163)/(86-96) 159/89        There is no height or weight on file to calculate BMI.        Physical Exam  Constitutional:       General: She is not in acute distress.     Appearance: Normal appearance.   HENT:      Right Ear: External ear normal.      Left Ear: External ear normal.   Eyes:      Comments: R periorbital swelling   Superficial abrasion to R eyebrow   Patient does not follow commands but appears to have intact EOM bilaterally  Pupils dilated per ophtho      Neurological:      Mental Status: She is alert.      Comments: Unable to assess for CN V numbness        Significant Labs:  All pertinent labs from the last 24 hours have been reviewed.    Significant Diagnostics:  CT: I have reviewed all pertinent results/findings within the past 24 hours and my  personal findings are:  R orbital floor fx, no evidence of muscle entrapment, able to visualize muscles tracts       Assessment/Plan:     Closed fracture of right orbital floor  72 y.o F with R orbital floor fracture, unclear etiology. HPI and exam limited 2/2 dementia but EOM appears to be intact. Suspect some degree of V2 numbness based on fracture line but unable to assess on exam.     - Agree with ophtho evaluation  - No acute ENT interventions at this time   - Keflex   - Sinonasal precautions   - No nose blowing, sneeze with mouth open   - Ice to R eye for swelling   - Will arrange follow up with Dr. Reynoso to reassess         VTE Risk Mitigation (From admission, onward)    None          Sandi Morgan MD  Otorhinolaryngology-Head & Neck Surgery  [facility] - Emergency Dept

## 2023-03-10 NOTE — ED PROVIDER NOTES
Encounter Date: 3/10/2023       History     Chief Complaint   Patient presents with    Abrasion     Abrasion over R eye. Pt has dementia and was not found on ground, unknown source of injury.     Patient is a 72-year-old female with a past medical history of hypertension, hyperlipidemia and advanced dementia presenting to the ED from [facility] for concern for right periorbital swelling and a supraorbital abrasion.  Per triage note, facility does not know of any recent falls.  Patient is unable to provide much of a history secondary to her dementia.  Per chart review, she is not on anticoagulation.      Review of patient's allergies indicates:   Allergen Reactions    No known drug allergies      Past Medical History:   Diagnosis Date    Chronic rhinitis     Colon polyps     Hyperlipidemia     Hypertension     Pyoderma gangrenosum     left leg    Steroid-induced diabetes mellitus      Past Surgical History:   Procedure Laterality Date     SECTION      2 surgeries.     Family History   Problem Relation Age of Onset    Hypertension Mother     Heart disease Mother     Cancer Mother         lung    Cancer Father 70        colon    Diabetes Sister     Hypertension Sister     Seizures Brother      Social History     Tobacco Use    Smoking status: Former     Packs/day: 0.25     Years: 7.00     Pack years: 1.75     Types: Cigarettes     Quit date: 1997     Years since quittin.6    Smokeless tobacco: Never   Substance Use Topics    Alcohol use: Yes     Alcohol/week: 7.0 standard drinks     Types: 7 Glasses of wine per week    Drug use: No     Review of Systems  All other systems reviewed and were negative; see HPI also for additional ROS.    Physical Exam     Initial Vitals [03/10/23 1025]   BP Pulse Resp Temp SpO2   (!) 158/96 98 18 98.1 °F (36.7 °C) 100 %      MAP       --         Physical Exam    Nursing note and vitals reviewed.  Constitutional: She appears well-developed and well-nourished.  She is not diaphoretic. No distress.   Pleasantly demented.  Speaking full sentences.  No acute distress.   HENT:   Head: Normocephalic. Head is with abrasion.   Right Ear: External ear normal.   Left Ear: External ear normal.   Eyes:   Right periorbital swelling.  There is no conjunctival injection.  Limited EOM with downward gaze.    Neck: Neck supple.   Cardiovascular:  Normal rate, regular rhythm, normal heart sounds and intact distal pulses.           Pulmonary/Chest: Breath sounds normal. No respiratory distress. She has no wheezes. She has no rhonchi. She has no rales.   Abdominal: Abdomen is soft. She exhibits no distension. There is no abdominal tenderness. There is no rebound and no guarding.   Musculoskeletal:      Cervical back: Neck supple.     Neurological: She is alert and oriented to person, place, and time. GCS score is 15. GCS eye subscore is 4. GCS verbal subscore is 5. GCS motor subscore is 6.   Has difficulty following commands secondary to dementia.  Nonfocal neuro exam.   Skin: Skin is warm. Capillary refill takes less than 2 seconds. No rash noted.   Psychiatric: She has a normal mood and affect.       ED Course   Procedures  Labs Reviewed - No data to display       Imaging Results              CT Head Without Contrast (Final result)  Result time 03/10/23 12:54:06      Final result by Clem Vu MD (03/10/23 12:54:06)                   Impression:      1. Inferior right frontal/supraorbital scalp localized soft tissue swelling/contusion without displaced skull fracture or acute intracranial abnormality identified.  2. Right orbital floor fracture with extension to the infraorbital foramen, as above.  Correlate for potential neurovascular compromise and/or tethering/entrapment of the right inferior rectus muscle.  Ophthalmology consultation may be warranted.  3. CT evidence of cervical spine acute osseous traumatic injury.  4. Cervical spondylosis resulting in at most mild acquired canal  stenosis with moderate right neural foraminal narrowing at C6-7 level, as further discussed above.  5. Few additional findings as above.      Electronically signed by: Clem Vu MD  Date:    03/10/2023  Time:    12:54               Narrative:    EXAMINATION:  CT HEAD WITHOUT CONTRAST; CT CERVICAL SPINE WITHOUT CONTRAST; CT MAXILLOFACIAL WITHOUT CONTRAST    CLINICAL HISTORY:  Head trauma, minor (Age >= 65y);; Neck trauma (Age >= 65y);; Facial trauma, blunt;    TECHNIQUE:  Low dose axial CT images obtained throughout the head, face and cervical spine without intravenous contrast. Sagittal and coronal reconstructions were performed.    COMPARISON:  Head CT 09/19/2020, MRI brain 09/11/2017; chest radiograph 06/08/2020    FINDINGS:  Patient motion limited evaluation on initial images but mostly resolved with repeat scanning.    Head CT:    Intracranial compartment:    Age-related generalized cerebral volume loss.  Ventricles are midline and stable in size and configuration without distortion by mass effect or acute hydrocephalus noting cavum septum pellucidum.  No extra-axial blood or fluid collections.    Grossly stable mild patchy hypoattenuation of the subcortical and periventricular white matter likely sequela of chronic microvascular ischemic change.  Unchanged small hypoattenuating focus at the anterior limb left internal capsule likely remote lacunar-type infarct.  No definite new focal areas of abnormal parenchymal attenuation.  Skull base atherosclerotic vascular calcifications noted.  No parenchymal mass, hemorrhage, edema or major vascular distribution infarct.    Skull/extracranial contents (limited evaluation): Right inferior frontal/supraorbital scalp localized soft tissue swelling/contusion.  No displaced skull fracture.    Maxillofacial CT: Beam hardening with streak artifact from dental hardware and patient motion somewhat limits evaluation.  Patient is slightly rotated and tilted within the  scanner.    There is right periorbital and preseptal soft tissue swelling/contusion extending to the lateral infraorbital/malar region and overlying the anterior aspect of the right zygomatic arch.    There is suspected recent fracture involving the right orbital floor with mild comminution and slight depression of fragments into the roof of the maxillary sinus, up to 4-5 mm.  Fracture planes extend to the right infraorbital foramen which is mildly disrupted.  There is mild mucosal thickening throughout the maxillary sinus particularly along the roof near the fracture site with similar retention cyst versus polyp at the maxillary antra on the right, but no significant associated right-sided hemorrhagic opacification.  Remainder of the right orbit is intact.  Slight extraconal stranding within the inferior aspect of the right orbit near the right inferior rectus muscle.  The right extraocular muscles are otherwise normal in morphology and position.  No retro bulbar hematoma on either side.  Both ocular lenses are anteriorly located.  Both globes are symmetric and intact.  Left orbit is within normal limits.  No intraorbital gas on either side.    Bilateral orbital rims, zygomatic arches, pterygoid plates, mandibular condyles, TMJs and nasal bones appear relatively symmetric and intact.  Slight rightward deviation of the bony nasal septum which is otherwise intact.  Few small retention cysts versus polyps in the left maxillary antra.  Patchy opacification of a few middle right-sided ethmoidal air cells.  Remaining paranasal sinuses, middle ears and mastoid air cells are clear.  Suspected cerumen within the bilateral external auditory canals.  No subcutaneous emphysema or radiodense retained foreign body.    Included airway is midline and patent.  Bilateral parotid and submandibular glands are grossly within normal limits.    Cervical spine CT: Patient is rotated and tilted within the scanner.  Beam hardening with  streak artifact from dental hardware somewhat limits evaluation.    There is levocurvature.  Cervical lordosis is maintained.  No occipital condylar fracture.  Mild to moderate degenerative change at the atlantodental interval.  Dens and lateral masses are otherwise well aligned and intact.  Degenerative related minimal grade 1 anterolisthesis of C6 on 7.  Cervical vertebral body heights appear maintained without evidence of acute compression fracture.  No displaced fracture, dislocation or destructive osseous process.  No significant prevertebral soft tissue thickening.  No paraspinal mass or fluid collection.  No subcutaneous emphysema or radiodense retained foreign body.  Multilevel minimal to mild degenerative disc disease and overall mild uncovertebral and facet arthrosis.    C2-3: Mild to moderate right neural foraminal narrowing.  No significant spinal canal stenosis or left neural foraminal narrowing.    C3-4: Minimal bilateral neural foraminal narrowing.  No significant spinal canal stenosis.    C4-5: Mild to moderate left neural foraminal narrowing.  No significant spinal canal stenosis or right neural foraminal narrowing.    C5-6: Moderate right neural foraminal narrowing.  No significant spinal canal stenosis or left neural foraminal narrowing.    C6-7: Posterior disc osteophyte complex resulting in minimal acquired canal stenosis.  Moderate right and mild-to-moderate left neural foraminal narrowing.    C7-T1: Mild right neural foraminal narrowing.  No significant spinal canal stenosis or left neural foraminal narrowing.    Included airway is relatively midline and patent.  Minimal biapical pleuroparenchymal scarring.  No apical pneumothorax.  Scattered atherosclerotic vascular calcifications noted.                                       CT Maxillofacial Without Contrast (Final result)  Result time 03/10/23 12:54:06      Final result by Clem Vu MD (03/10/23 12:54:06)                   Impression:       1. Inferior right frontal/supraorbital scalp localized soft tissue swelling/contusion without displaced skull fracture or acute intracranial abnormality identified.  2. Right orbital floor fracture with extension to the infraorbital foramen, as above.  Correlate for potential neurovascular compromise and/or tethering/entrapment of the right inferior rectus muscle.  Ophthalmology consultation may be warranted.  3. CT evidence of cervical spine acute osseous traumatic injury.  4. Cervical spondylosis resulting in at most mild acquired canal stenosis with moderate right neural foraminal narrowing at C6-7 level, as further discussed above.  5. Few additional findings as above.      Electronically signed by: Clem Vu MD  Date:    03/10/2023  Time:    12:54               Narrative:    EXAMINATION:  CT HEAD WITHOUT CONTRAST; CT CERVICAL SPINE WITHOUT CONTRAST; CT MAXILLOFACIAL WITHOUT CONTRAST    CLINICAL HISTORY:  Head trauma, minor (Age >= 65y);; Neck trauma (Age >= 65y);; Facial trauma, blunt;    TECHNIQUE:  Low dose axial CT images obtained throughout the head, face and cervical spine without intravenous contrast. Sagittal and coronal reconstructions were performed.    COMPARISON:  Head CT 09/19/2020, MRI brain 09/11/2017; chest radiograph 06/08/2020    FINDINGS:  Patient motion limited evaluation on initial images but mostly resolved with repeat scanning.    Head CT:    Intracranial compartment:    Age-related generalized cerebral volume loss.  Ventricles are midline and stable in size and configuration without distortion by mass effect or acute hydrocephalus noting cavum septum pellucidum.  No extra-axial blood or fluid collections.    Grossly stable mild patchy hypoattenuation of the subcortical and periventricular white matter likely sequela of chronic microvascular ischemic change.  Unchanged small hypoattenuating focus at the anterior limb left internal capsule likely remote lacunar-type infarct.  No  definite new focal areas of abnormal parenchymal attenuation.  Skull base atherosclerotic vascular calcifications noted.  No parenchymal mass, hemorrhage, edema or major vascular distribution infarct.    Skull/extracranial contents (limited evaluation): Right inferior frontal/supraorbital scalp localized soft tissue swelling/contusion.  No displaced skull fracture.    Maxillofacial CT: Beam hardening with streak artifact from dental hardware and patient motion somewhat limits evaluation.  Patient is slightly rotated and tilted within the scanner.    There is right periorbital and preseptal soft tissue swelling/contusion extending to the lateral infraorbital/malar region and overlying the anterior aspect of the right zygomatic arch.    There is suspected recent fracture involving the right orbital floor with mild comminution and slight depression of fragments into the roof of the maxillary sinus, up to 4-5 mm.  Fracture planes extend to the right infraorbital foramen which is mildly disrupted.  There is mild mucosal thickening throughout the maxillary sinus particularly along the roof near the fracture site with similar retention cyst versus polyp at the maxillary antra on the right, but no significant associated right-sided hemorrhagic opacification.  Remainder of the right orbit is intact.  Slight extraconal stranding within the inferior aspect of the right orbit near the right inferior rectus muscle.  The right extraocular muscles are otherwise normal in morphology and position.  No retro bulbar hematoma on either side.  Both ocular lenses are anteriorly located.  Both globes are symmetric and intact.  Left orbit is within normal limits.  No intraorbital gas on either side.    Bilateral orbital rims, zygomatic arches, pterygoid plates, mandibular condyles, TMJs and nasal bones appear relatively symmetric and intact.  Slight rightward deviation of the bony nasal septum which is otherwise intact.  Few small  retention cysts versus polyps in the left maxillary antra.  Patchy opacification of a few middle right-sided ethmoidal air cells.  Remaining paranasal sinuses, middle ears and mastoid air cells are clear.  Suspected cerumen within the bilateral external auditory canals.  No subcutaneous emphysema or radiodense retained foreign body.    Included airway is midline and patent.  Bilateral parotid and submandibular glands are grossly within normal limits.    Cervical spine CT: Patient is rotated and tilted within the scanner.  Beam hardening with streak artifact from dental hardware somewhat limits evaluation.    There is levocurvature.  Cervical lordosis is maintained.  No occipital condylar fracture.  Mild to moderate degenerative change at the atlantodental interval.  Dens and lateral masses are otherwise well aligned and intact.  Degenerative related minimal grade 1 anterolisthesis of C6 on 7.  Cervical vertebral body heights appear maintained without evidence of acute compression fracture.  No displaced fracture, dislocation or destructive osseous process.  No significant prevertebral soft tissue thickening.  No paraspinal mass or fluid collection.  No subcutaneous emphysema or radiodense retained foreign body.  Multilevel minimal to mild degenerative disc disease and overall mild uncovertebral and facet arthrosis.    C2-3: Mild to moderate right neural foraminal narrowing.  No significant spinal canal stenosis or left neural foraminal narrowing.    C3-4: Minimal bilateral neural foraminal narrowing.  No significant spinal canal stenosis.    C4-5: Mild to moderate left neural foraminal narrowing.  No significant spinal canal stenosis or right neural foraminal narrowing.    C5-6: Moderate right neural foraminal narrowing.  No significant spinal canal stenosis or left neural foraminal narrowing.    C6-7: Posterior disc osteophyte complex resulting in minimal acquired canal stenosis.  Moderate right and  mild-to-moderate left neural foraminal narrowing.    C7-T1: Mild right neural foraminal narrowing.  No significant spinal canal stenosis or left neural foraminal narrowing.    Included airway is relatively midline and patent.  Minimal biapical pleuroparenchymal scarring.  No apical pneumothorax.  Scattered atherosclerotic vascular calcifications noted.                                       CT Cervical Spine Without Contrast (Final result)  Result time 03/10/23 12:54:06      Final result by Clem Vu MD (03/10/23 12:54:06)                   Impression:      1. Inferior right frontal/supraorbital scalp localized soft tissue swelling/contusion without displaced skull fracture or acute intracranial abnormality identified.  2. Right orbital floor fracture with extension to the infraorbital foramen, as above.  Correlate for potential neurovascular compromise and/or tethering/entrapment of the right inferior rectus muscle.  Ophthalmology consultation may be warranted.  3. CT evidence of cervical spine acute osseous traumatic injury.  4. Cervical spondylosis resulting in at most mild acquired canal stenosis with moderate right neural foraminal narrowing at C6-7 level, as further discussed above.  5. Few additional findings as above.      Electronically signed by: Clem Vu MD  Date:    03/10/2023  Time:    12:54               Narrative:    EXAMINATION:  CT HEAD WITHOUT CONTRAST; CT CERVICAL SPINE WITHOUT CONTRAST; CT MAXILLOFACIAL WITHOUT CONTRAST    CLINICAL HISTORY:  Head trauma, minor (Age >= 65y);; Neck trauma (Age >= 65y);; Facial trauma, blunt;    TECHNIQUE:  Low dose axial CT images obtained throughout the head, face and cervical spine without intravenous contrast. Sagittal and coronal reconstructions were performed.    COMPARISON:  Head CT 09/19/2020, MRI brain 09/11/2017; chest radiograph 06/08/2020    FINDINGS:  Patient motion limited evaluation on initial images but mostly resolved with repeat  scanning.    Head CT:    Intracranial compartment:    Age-related generalized cerebral volume loss.  Ventricles are midline and stable in size and configuration without distortion by mass effect or acute hydrocephalus noting cavum septum pellucidum.  No extra-axial blood or fluid collections.    Grossly stable mild patchy hypoattenuation of the subcortical and periventricular white matter likely sequela of chronic microvascular ischemic change.  Unchanged small hypoattenuating focus at the anterior limb left internal capsule likely remote lacunar-type infarct.  No definite new focal areas of abnormal parenchymal attenuation.  Skull base atherosclerotic vascular calcifications noted.  No parenchymal mass, hemorrhage, edema or major vascular distribution infarct.    Skull/extracranial contents (limited evaluation): Right inferior frontal/supraorbital scalp localized soft tissue swelling/contusion.  No displaced skull fracture.    Maxillofacial CT: Beam hardening with streak artifact from dental hardware and patient motion somewhat limits evaluation.  Patient is slightly rotated and tilted within the scanner.    There is right periorbital and preseptal soft tissue swelling/contusion extending to the lateral infraorbital/malar region and overlying the anterior aspect of the right zygomatic arch.    There is suspected recent fracture involving the right orbital floor with mild comminution and slight depression of fragments into the roof of the maxillary sinus, up to 4-5 mm.  Fracture planes extend to the right infraorbital foramen which is mildly disrupted.  There is mild mucosal thickening throughout the maxillary sinus particularly along the roof near the fracture site with similar retention cyst versus polyp at the maxillary antra on the right, but no significant associated right-sided hemorrhagic opacification.  Remainder of the right orbit is intact.  Slight extraconal stranding within the inferior aspect of the  right orbit near the right inferior rectus muscle.  The right extraocular muscles are otherwise normal in morphology and position.  No retro bulbar hematoma on either side.  Both ocular lenses are anteriorly located.  Both globes are symmetric and intact.  Left orbit is within normal limits.  No intraorbital gas on either side.    Bilateral orbital rims, zygomatic arches, pterygoid plates, mandibular condyles, TMJs and nasal bones appear relatively symmetric and intact.  Slight rightward deviation of the bony nasal septum which is otherwise intact.  Few small retention cysts versus polyps in the left maxillary antra.  Patchy opacification of a few middle right-sided ethmoidal air cells.  Remaining paranasal sinuses, middle ears and mastoid air cells are clear.  Suspected cerumen within the bilateral external auditory canals.  No subcutaneous emphysema or radiodense retained foreign body.    Included airway is midline and patent.  Bilateral parotid and submandibular glands are grossly within normal limits.    Cervical spine CT: Patient is rotated and tilted within the scanner.  Beam hardening with streak artifact from dental hardware somewhat limits evaluation.    There is levocurvature.  Cervical lordosis is maintained.  No occipital condylar fracture.  Mild to moderate degenerative change at the atlantodental interval.  Dens and lateral masses are otherwise well aligned and intact.  Degenerative related minimal grade 1 anterolisthesis of C6 on 7.  Cervical vertebral body heights appear maintained without evidence of acute compression fracture.  No displaced fracture, dislocation or destructive osseous process.  No significant prevertebral soft tissue thickening.  No paraspinal mass or fluid collection.  No subcutaneous emphysema or radiodense retained foreign body.  Multilevel minimal to mild degenerative disc disease and overall mild uncovertebral and facet arthrosis.    C2-3: Mild to moderate right neural  foraminal narrowing.  No significant spinal canal stenosis or left neural foraminal narrowing.    C3-4: Minimal bilateral neural foraminal narrowing.  No significant spinal canal stenosis.    C4-5: Mild to moderate left neural foraminal narrowing.  No significant spinal canal stenosis or right neural foraminal narrowing.    C5-6: Moderate right neural foraminal narrowing.  No significant spinal canal stenosis or left neural foraminal narrowing.    C6-7: Posterior disc osteophyte complex resulting in minimal acquired canal stenosis.  Moderate right and mild-to-moderate left neural foraminal narrowing.    C7-T1: Mild right neural foraminal narrowing.  No significant spinal canal stenosis or left neural foraminal narrowing.    Included airway is relatively midline and patent.  Minimal biapical pleuroparenchymal scarring.  No apical pneumothorax.  Scattered atherosclerotic vascular calcifications noted.                                    X-Rays:   Independently Interpreted Readings:   Head CT: CT head, maxillofacial shows inferior right orbital wall fracture, no LeFort fracture, no intracranial hemorrhage, soft tissue contusion and right orbital floor fracture with extension to the infraorbital foramen.   Medications - No data to display  Medical Decision Making:   History:   I obtained history from: someone other than patient.  Old Medical Records: I decided to obtain old medical records.  Old Records Summarized: records from another hospital.       <> Summary of Records: Attempted to obtain medical records and history from assisted living facility:  Unclear history of fall or unwitnessed event  Initial Assessment:   Emergent evaluation of evident facial trauma.  She is afebrile and hemodynamically stable.  Differential Diagnosis:   ICH, skull fracture, cervical fracture vs dislocation, orbital floor fracture vs other facial fracture, ocular muscle entrapment  Independently Interpreted Test(s):   I have ordered and  independently interpreted X-rays - see prior notes.  Clinical Tests:   Radiological Study: Ordered and Reviewed  ED Management:  CT max face is remarkable for a right orbital floor fracture with fracture planes extending into the right infraorbital foramen. Given these findings and physicam exam with limitation in downward movement.  Discussed case with both Ophthalmology and ENT. They evaluated patient at bedside.  The patient was signed out to the oncoming team at shift change pending final recommendations from both services.  Other:   I have discussed this case with another health care provider.       <> Summary of the Discussion: Ophthalmology and ENT          Attending Attestation:   Physician Attestation Statement for Resident:  As the supervising MD   Physician Attestation Statement: I have personally seen and examined this patient.   I agree with the above history.  -:   As the supervising MD I agree with the above PE.     As the supervising MD I agree with the above treatment, course, plan, and disposition.                        I have reviewed and concur with the resident's history, physical, assessment, and plan.  I have personally interviewed and examined the patient at bedside.   I did supervise any and all procedures and was present for any critical portion, and was always immediately available for help and as a resource.     The above history physical, review of symptoms, HPI and physical exam reflect my independent interpretation and evaluation.    Briefly, 72-year-old female with baseline dementia presenting from extended care facility with concern for facial trauma to the right periorbital region.  No obvious eye entrapment on my exam, no retrobulbar hematoma, unable to assess true visual acuity given severe dementia.  Patient able to follow commands, and I am able to visualize eye movement.  CT scan of the face and head obtained shows orbital floor fracture.  Discussed case with Ophthalmology and  ENT with plans for outpatient follow-up and no concern for entrapment.  Please see final sign-out note for further disposition.    Complexity:  Moderate High Risk    Final diagnoses:  [S02.31XA] Closed fracture of right orbital floor, initial encounter (Primary)     Cedric Camejo DO, FAAEM  Emergency Staff Physician   Dept of Emergency Medicine   [facility]  Spectralink: [id number]        Disclaimer: This note has been generated using voice-recognition software. There may be typographical errors that have been missed during proof-reading.                 Clinical Impression:   Final diagnoses:  [S02.31XA] Closed fracture of right orbital floor, initial encounter (Primary)        ED Disposition Condition    Discharge Stable          ED Prescriptions       Medication Sig Dispense Start Date End Date Auth. Provider    cephALEXin (KEFLEX) 500 MG capsule Take 1 capsule (500 mg total) by mouth 4 (four) times daily. for 7 days 28 capsule 3/10/2023 3/17/2023 Lex Das MD          Follow-up Information       Follow up With Specialties Details Why Contact Info    Marc Reynoso MD Otolaryngology Go to   [address] [facility] [address]  [phone]               Aquiles Sena MD  Resident  03/10/23 1612       Cedric Camejo DO  03/11/23 6712

## 2023-03-10 NOTE — ED NOTES
Miranda Houston, a 72 y.o. female presents to the ED w/ complaint of abrasion and swelling visible to region above R eye - pt presents w paperwork from [facility] stating that she was noted to have abrasion today above R eye w swelling. Pt has notable AMS possibly r/t hx of dementia - pt appears to be poor historian but is oriented to person and location. Pt denies falling and per ems pt was not found on ground at facility. Pt visibly stable, no active bleeding at this time and denies vision loss to R eye. Pt appears to be freshly clothed, clean, and well taken care of w no other visible signs of injury to this RN. Will continue to monitor.     Triage note:  Chief Complaint   Patient presents with    Abrasion     Abrasion over R eye. Pt has dementia and was not found on ground, unknown source of injury.     Review of patient's allergies indicates:   Allergen Reactions    No known drug allergies      Past Medical History:   Diagnosis Date    Chronic rhinitis     Colon polyps     Hyperlipidemia     Hypertension     Pyoderma gangrenosum     left leg    Steroid-induced diabetes mellitus

## 2023-03-27 ENCOUNTER — OFFICE VISIT (OUTPATIENT)
Dept: OTOLARYNGOLOGY | Facility: CLINIC | Age: 73
End: 2023-03-27
Payer: MEDICARE

## 2023-03-27 VITALS — HEART RATE: 89 BPM | SYSTOLIC BLOOD PRESSURE: 108 MMHG | DIASTOLIC BLOOD PRESSURE: 68 MMHG

## 2023-03-27 DIAGNOSIS — S02.31XD CLOSED FRACTURE OF RIGHT ORBITAL FLOOR WITH ROUTINE HEALING, SUBSEQUENT ENCOUNTER: Primary | ICD-10-CM

## 2023-03-27 PROCEDURE — 99999 PR PBB SHADOW E&M-EST. PATIENT-LVL II: CPT | Mod: PBBFAC,,, | Performed by: OTOLARYNGOLOGY

## 2023-03-27 PROCEDURE — 99212 OFFICE O/P EST SF 10 MIN: CPT | Mod: PBBFAC | Performed by: OTOLARYNGOLOGY

## 2023-03-27 PROCEDURE — 99999 PR PBB SHADOW E&M-EST. PATIENT-LVL II: ICD-10-PCS | Mod: PBBFAC,,, | Performed by: OTOLARYNGOLOGY

## 2023-03-27 PROCEDURE — 99213 PR OFFICE/OUTPT VISIT, EST, LEVL III, 20-29 MIN: ICD-10-PCS | Mod: S$PBB,,, | Performed by: OTOLARYNGOLOGY

## 2023-03-27 PROCEDURE — 99213 OFFICE O/P EST LOW 20 MIN: CPT | Mod: S$PBB,,, | Performed by: OTOLARYNGOLOGY

## 2023-03-27 NOTE — PROGRESS NOTES
History of Present Illness:   Miranda Houston is a 72 y.o. year old female evaluated on 3/27/2023, in the Otolaryngology-Head and Neck Surgery Clinic at [facility]. The patient was referred by Dr. Calle for evaluation of orbital fracture after a fall.  Patient has dementia and the interview was done mostly with the caretaker from facility.  She was not witnessed to the event but patient had a fall with unknown loss of consciousness.  She presented to our ER with CT scan done showing orbital fracture.  Due to mental condition she can not tell me if she is having any diplopia.  She does not appear to have any facial asymmetry or enophthalmos.  All swelling has resolved.  Caretaker denies any bleeding from the nose or any problems eating.               Past Medical/Surgical History  Past Medical History:   Diagnosis Date    Chronic rhinitis     Colon polyps     Hyperlipidemia     Hypertension     Pyoderma gangrenosum     left leg    Steroid-induced diabetes mellitus      Her  has a past surgical history that includes  section.     Past Family/Social History  Her family history includes Cancer in her mother; Cancer (age of onset: 70) in her father; Diabetes in her sister; Heart disease in her mother; Hypertension in her mother and sister; Seizures in her brother.  She  reports that she quit smoking about 25 years ago. Her smoking use included cigarettes. She has a 1.75 pack-year smoking history. She has never used smokeless tobacco. She reports current alcohol use of about 7.0 standard drinks per week. She reports that she does not use drugs.     Medications/Allergies/Immunizations  Her current medication(s) include:   Current Outpatient Medications   Medication Sig Dispense Refill    amLODIPine (NORVASC) 5 MG tablet TAKE 1 TABLET ONE TIME DAILY FOR BLOOD PRESSURE 90 tablet 3    econazole nitrate 1 % cream Apply topically once daily. 30 g 2    hydrocortisone 2.5 % cream Apply topically 3 (three)  times daily. 45 g 0    losartan (COZAAR) 50 MG tablet       mupirocin (BACTROBAN) 2 % ointment Apply topically 2 (two) times daily. 30 g 0    donepeziL (ARICEPT) 5 MG tablet Take 1 tablet (5 mg total) by mouth every morning. 30 tablet 6    haloperidoL (HALDOL) 0.5 MG tablet Take 0.5 tablets (0.25 mg total) by mouth every morning. 15 tablet 6    memantine (NAMENDA) 10 MG Tab Take 1 tablet (10 mg total) by mouth 2 (two) times daily. For memory 180 tablet 3    QUEtiapine (SEROQUEL) 25 MG Tab Take 1 tablet (25 mg total) by mouth nightly. 30 tablet 5     No current facility-administered medications for this visit.        Allergies: No known drug allergies     Immunizations:   Immunization History   Administered Date(s) Administered    Influenza - Quadrivalent 10/27/2015, 07/25/2020    PPD Test 06/08/2020, 07/25/2020    Pneumococcal Conjugate - 13 Valent 01/28/2016, 07/25/2020    Pneumococcal Polysaccharide - 23 Valent 06/24/2019, 07/25/2020         Review of Systems   Constitutional: Negative for fever, weight loss and weight gain.  Skin: Negative for rash, itchiness, dryness  HENT:  As per HPI  Cardiovascular: Negative for chest pain and dyspnea on exertion .   Respiratory: Is not experiencing shortness of breath.   Gastrointestinal: Negative for nausea and vomiting.   Neurological: Negative for headaches.   Lymph/Heme: Negative for lymphadenopathy or easy bruising  Musculoskeletal: Negative for joint or muscle pain  Psychiatric: The patient is not nervous/anxious.        All other systems are negative except for that listed in the HPI.      PHYSICAL EXAM:   Vital Signs:  /68   Pulse 89      General:  Well-developed, well-nourished  Communication and Voice:  Clear pitch and clarity  Hearing: Hearing adequate for verbal communication bilaterally   Inspection:  Normocephalic and atraumatic without mass or lesion  Palpation:  Facial skeleton intact without bony stepoffs  Parotid Glands:  No mass or  tenderness  Facial Strength:  Facial motility symmetric and full bilaterally  Pinna:  External ear intact and fully developed  External canal:  Canal is patent with intact skin  Tympanic Membrane:  Clear and mobile  External nose:  No scar or anatomic deformity  Internal Nose:  Septum intact and midline.  No edema, polyp, or rhinorrhea.  TMJ:  No pain to palpation with full mobility  Oral cavity, Lips, Teeth, and Gums:  Mucosa and teeth intact and viable, No lesions, masses or ulcers  Oropharynx: No erythema or exudate, no masses or ulcerations, non-obstructive tonsils  Nasopharynx:  No mass or lesion with intact mucosa  Hypopharynx:  Not well visualized secondary to gagging  Larynx:  Not well visualized secondary to gagging  Neck, Trachea, Lymphatics:  Midline trachea without mass or lesion, no lymphadenopathy  Thyroid:  No mass or nodularity  Eyes: No nystagmus with equal extraocular motion bilaterally  Neuro/Psych/Balance:  Patient is not oriented with flat affect wheelchair-bound; Cranial nerves I-XII are intact  Respiratory effort:  Equal inspiration and expiration without stridor  Peripheral Vascular:  Warm extremities with equal pulses     RADIOLOGIC REVIEW:    I have personally reviewed the CT scan and went over the imaging with caretaker.  Small nondisplaced orbital floor fracture no intervention needed  CT max face without contrast 03/10/2023  Impression:     1. Inferior right frontal/supraorbital scalp localized soft tissue swelling/contusion without displaced skull fracture or acute intracranial abnormality identified.  2. Right orbital floor fracture with extension to the infraorbital foramen, as above.  Correlate for potential neurovascular compromise and/or tethering/entrapment of the right inferior rectus muscle.  Ophthalmology consultation may be warranted.  3. CT evidence of cervical spine acute osseous traumatic injury.  4. Cervical spondylosis resulting in at most mild acquired canal stenosis  with moderate right neural foraminal narrowing at C6-7 level, as further discussed above.  5. Few additional findings as above.     ASSESSMENT:   1. Closed fracture of right orbital floor with routine healing, subsequent encounter            PLAN:   Non operative fracture of the right orbital floor with routine healing.  No further intervention needed and this was documented in a note for facility.  Follow-up as needed           DISCLAIMER: This note was prepared with [employer] voice recognition transcription software. Garbled syntax, mangled pronouns, and other bizarre constructions may be attributed to that software system. While efforts were made to correct any mistakes made by this voice recognition program, some errors and/or omissions may remain in the note that were missed when the note was originally created.

## 2023-04-21 ENCOUNTER — HOSPITAL ENCOUNTER (INPATIENT)
Facility: HOSPITAL | Age: 73
LOS: 7 days | Discharge: NURSING, REHAB OR LONG-TERM CARE | DRG: 065 | End: 2023-04-28
Attending: STUDENT IN AN ORGANIZED HEALTH CARE EDUCATION/TRAINING PROGRAM | Admitting: INTERNAL MEDICINE
Payer: MEDICARE

## 2023-04-21 DIAGNOSIS — R00.1 BRADYCARDIA: ICD-10-CM

## 2023-04-21 DIAGNOSIS — Z01.89 ENCOUNTER FOR IMAGING TO SCREEN FOR METAL PRIOR TO MRI: ICD-10-CM

## 2023-04-21 DIAGNOSIS — E11.69 TYPE 2 DIABETES MELLITUS WITH OTHER SPECIFIED COMPLICATION, UNSPECIFIED WHETHER LONG TERM INSULIN USE: ICD-10-CM

## 2023-04-21 DIAGNOSIS — I63.9 CVA (CEREBRAL VASCULAR ACCIDENT): ICD-10-CM

## 2023-04-21 DIAGNOSIS — I95.9 HYPOTENSION, UNSPECIFIED HYPOTENSION TYPE: ICD-10-CM

## 2023-04-21 DIAGNOSIS — E87.0 HYPERNATREMIA: Primary | ICD-10-CM

## 2023-04-21 DIAGNOSIS — A41.9 SEPSIS, DUE TO UNSPECIFIED ORGANISM, UNSPECIFIED WHETHER ACUTE ORGAN DYSFUNCTION PRESENT: ICD-10-CM

## 2023-04-21 DIAGNOSIS — R41.82 AMS (ALTERED MENTAL STATUS): ICD-10-CM

## 2023-04-21 DIAGNOSIS — S81.802A LEG WOUND, LEFT: ICD-10-CM

## 2023-04-21 PROBLEM — F03.90 DEMENTIA: Status: ACTIVE | Noted: 2023-04-21

## 2023-04-21 LAB
ALBUMIN SERPL BCP-MCNC: 2.5 G/DL (ref 3.5–5.2)
ALLENS TEST: ABNORMAL
ALLENS TEST: ABNORMAL
ALP SERPL-CCNC: 65 U/L (ref 55–135)
ALT SERPL W/O P-5'-P-CCNC: 40 U/L (ref 10–44)
AMPHET+METHAMPHET UR QL: NEGATIVE
ANION GAP SERPL CALC-SCNC: 18 MMOL/L (ref 8–16)
AST SERPL-CCNC: 38 U/L (ref 10–40)
BACTERIA #/AREA URNS AUTO: ABNORMAL /HPF
BARBITURATES UR QL SCN>200 NG/ML: NEGATIVE
BASOPHILS # BLD AUTO: 0.06 K/UL (ref 0–0.2)
BASOPHILS NFR BLD: 0.4 % (ref 0–1.9)
BENZODIAZ UR QL SCN>200 NG/ML: NEGATIVE
BILIRUB SERPL-MCNC: 0.5 MG/DL (ref 0.1–1)
BILIRUB UR QL STRIP: NEGATIVE
BNP SERPL-MCNC: 45 PG/ML (ref 0–99)
BUN SERPL-MCNC: 61 MG/DL (ref 8–23)
BZE UR QL SCN: NEGATIVE
CALCIUM SERPL-MCNC: 9.4 MG/DL (ref 8.7–10.5)
CANNABINOIDS UR QL SCN: NEGATIVE
CHLORIDE SERPL-SCNC: 119 MMOL/L (ref 95–110)
CLARITY UR REFRACT.AUTO: CLEAR
CO2 SERPL-SCNC: 25 MMOL/L (ref 23–29)
COLOR UR AUTO: YELLOW
CREAT SERPL-MCNC: 1.8 MG/DL (ref 0.5–1.4)
CREAT UR-MCNC: 254 MG/DL (ref 15–325)
DIFFERENTIAL METHOD: ABNORMAL
EOSINOPHIL # BLD AUTO: 0 K/UL (ref 0–0.5)
EOSINOPHIL NFR BLD: 0 % (ref 0–8)
ERYTHROCYTE [DISTWIDTH] IN BLOOD BY AUTOMATED COUNT: 15.1 % (ref 11.5–14.5)
EST. GFR  (NO RACE VARIABLE): 29.6 ML/MIN/1.73 M^2
GLUCOSE SERPL-MCNC: 121 MG/DL (ref 70–110)
GLUCOSE UR QL STRIP: NEGATIVE
HCO3 UR-SCNC: 34.6 MMOL/L (ref 24–28)
HCT VFR BLD AUTO: 44.2 % (ref 37–48.5)
HGB BLD-MCNC: 13.1 G/DL (ref 12–16)
HGB UR QL STRIP: NEGATIVE
HYALINE CASTS UR QL AUTO: 100 /LPF
IMM GRANULOCYTES # BLD AUTO: 0.09 K/UL (ref 0–0.04)
IMM GRANULOCYTES NFR BLD AUTO: 0.6 % (ref 0–0.5)
KETONES UR QL STRIP: ABNORMAL
LDH SERPL L TO P-CCNC: 3.01 MMOL/L (ref 0.5–2.2)
LEUKOCYTE ESTERASE UR QL STRIP: NEGATIVE
LYMPHOCYTES # BLD AUTO: 2.5 K/UL (ref 1–4.8)
LYMPHOCYTES NFR BLD: 17.4 % (ref 18–48)
MAGNESIUM SERPL-MCNC: 3.2 MG/DL (ref 1.6–2.6)
MCH RBC QN AUTO: 28.7 PG (ref 27–31)
MCHC RBC AUTO-ENTMCNC: 29.6 G/DL (ref 32–36)
MCV RBC AUTO: 97 FL (ref 82–98)
METHADONE UR QL SCN>300 NG/ML: NEGATIVE
MICROSCOPIC COMMENT: ABNORMAL
MONOCYTES # BLD AUTO: 1.5 K/UL (ref 0.3–1)
MONOCYTES NFR BLD: 10.2 % (ref 4–15)
NEUTROPHILS # BLD AUTO: 10.3 K/UL (ref 1.8–7.7)
NEUTROPHILS NFR BLD: 71.4 % (ref 38–73)
NITRITE UR QL STRIP: NEGATIVE
NRBC BLD-RTO: 0 /100 WBC
OPIATES UR QL SCN: NEGATIVE
PCO2 BLDA: 55.2 MMHG (ref 35–45)
PCP UR QL SCN>25 NG/ML: NEGATIVE
PH SMN: 7.41 [PH] (ref 7.35–7.45)
PH UR STRIP: 6 [PH] (ref 5–8)
PHOSPHATE SERPL-MCNC: 4.2 MG/DL (ref 2.7–4.5)
PLATELET # BLD AUTO: 385 K/UL (ref 150–450)
PMV BLD AUTO: 10.8 FL (ref 9.2–12.9)
PO2 BLDA: 18 MMHG (ref 40–60)
POC BE: 10 MMOL/L
POC SATURATED O2: 26 % (ref 95–100)
POC TCO2: 36 MMOL/L (ref 24–29)
POTASSIUM SERPL-SCNC: 4.3 MMOL/L (ref 3.5–5.1)
PROCALCITONIN SERPL IA-MCNC: 0.28 NG/ML
PROT SERPL-MCNC: 8.5 G/DL (ref 6–8.4)
PROT UR QL STRIP: ABNORMAL
RBC # BLD AUTO: 4.57 M/UL (ref 4–5.4)
RBC #/AREA URNS AUTO: 0 /HPF (ref 0–4)
SAMPLE: ABNORMAL
SAMPLE: ABNORMAL
SITE: ABNORMAL
SITE: ABNORMAL
SODIUM SERPL-SCNC: 162 MMOL/L (ref 136–145)
SP GR UR STRIP: 1.02 (ref 1–1.03)
SQUAMOUS #/AREA URNS AUTO: 4 /HPF
TOXICOLOGY INFORMATION: NORMAL
TROPONIN I SERPL DL<=0.01 NG/ML-MCNC: 0.03 NG/ML (ref 0–0.03)
TSH SERPL DL<=0.005 MIU/L-ACNC: 3.79 UIU/ML (ref 0.4–4)
URN SPEC COLLECT METH UR: ABNORMAL
WBC # BLD AUTO: 14.46 K/UL (ref 3.9–12.7)
WBC #/AREA URNS AUTO: 9 /HPF (ref 0–5)

## 2023-04-21 PROCEDURE — 80048 BASIC METABOLIC PNL TOTAL CA: CPT | Mod: XB

## 2023-04-21 PROCEDURE — 93010 ELECTROCARDIOGRAM REPORT: CPT | Mod: ,,, | Performed by: INTERNAL MEDICINE

## 2023-04-21 PROCEDURE — 83735 ASSAY OF MAGNESIUM: CPT | Performed by: STUDENT IN AN ORGANIZED HEALTH CARE EDUCATION/TRAINING PROGRAM

## 2023-04-21 PROCEDURE — 87040 BLOOD CULTURE FOR BACTERIA: CPT | Performed by: STUDENT IN AN ORGANIZED HEALTH CARE EDUCATION/TRAINING PROGRAM

## 2023-04-21 PROCEDURE — 86140 C-REACTIVE PROTEIN: CPT

## 2023-04-21 PROCEDURE — 84484 ASSAY OF TROPONIN QUANT: CPT | Performed by: STUDENT IN AN ORGANIZED HEALTH CARE EDUCATION/TRAINING PROGRAM

## 2023-04-21 PROCEDURE — 25000003 PHARM REV CODE 250: Performed by: STUDENT IN AN ORGANIZED HEALTH CARE EDUCATION/TRAINING PROGRAM

## 2023-04-21 PROCEDURE — 96366 THER/PROPH/DIAG IV INF ADDON: CPT

## 2023-04-21 PROCEDURE — 63600175 PHARM REV CODE 636 W HCPCS: Performed by: STUDENT IN AN ORGANIZED HEALTH CARE EDUCATION/TRAINING PROGRAM

## 2023-04-21 PROCEDURE — 81001 URINALYSIS AUTO W/SCOPE: CPT | Performed by: STUDENT IN AN ORGANIZED HEALTH CARE EDUCATION/TRAINING PROGRAM

## 2023-04-21 PROCEDURE — 87077 CULTURE AEROBIC IDENTIFY: CPT | Mod: 59 | Performed by: STUDENT IN AN ORGANIZED HEALTH CARE EDUCATION/TRAINING PROGRAM

## 2023-04-21 PROCEDURE — 94761 N-INVAS EAR/PLS OXIMETRY MLT: CPT

## 2023-04-21 PROCEDURE — 84443 ASSAY THYROID STIM HORMONE: CPT | Performed by: STUDENT IN AN ORGANIZED HEALTH CARE EDUCATION/TRAINING PROGRAM

## 2023-04-21 PROCEDURE — 93010 EKG 12-LEAD: ICD-10-PCS | Mod: ,,, | Performed by: INTERNAL MEDICINE

## 2023-04-21 PROCEDURE — 87186 SC STD MICRODIL/AGAR DIL: CPT | Performed by: STUDENT IN AN ORGANIZED HEALTH CARE EDUCATION/TRAINING PROGRAM

## 2023-04-21 PROCEDURE — 96365 THER/PROPH/DIAG IV INF INIT: CPT

## 2023-04-21 PROCEDURE — 12000002 HC ACUTE/MED SURGE SEMI-PRIVATE ROOM

## 2023-04-21 PROCEDURE — 84145 PROCALCITONIN (PCT): CPT | Performed by: STUDENT IN AN ORGANIZED HEALTH CARE EDUCATION/TRAINING PROGRAM

## 2023-04-21 PROCEDURE — 82803 BLOOD GASES ANY COMBINATION: CPT

## 2023-04-21 PROCEDURE — 85652 RBC SED RATE AUTOMATED: CPT

## 2023-04-21 PROCEDURE — 84100 ASSAY OF PHOSPHORUS: CPT | Performed by: STUDENT IN AN ORGANIZED HEALTH CARE EDUCATION/TRAINING PROGRAM

## 2023-04-21 PROCEDURE — 96368 THER/DIAG CONCURRENT INF: CPT

## 2023-04-21 PROCEDURE — 99291 PR CRITICAL CARE, E/M 30-74 MINUTES: ICD-10-PCS | Mod: ,,, | Performed by: STUDENT IN AN ORGANIZED HEALTH CARE EDUCATION/TRAINING PROGRAM

## 2023-04-21 PROCEDURE — 83880 ASSAY OF NATRIURETIC PEPTIDE: CPT | Performed by: STUDENT IN AN ORGANIZED HEALTH CARE EDUCATION/TRAINING PROGRAM

## 2023-04-21 PROCEDURE — 87150 DNA/RNA AMPLIFIED PROBE: CPT | Mod: 59 | Performed by: STUDENT IN AN ORGANIZED HEALTH CARE EDUCATION/TRAINING PROGRAM

## 2023-04-21 PROCEDURE — 99900035 HC TECH TIME PER 15 MIN (STAT)

## 2023-04-21 PROCEDURE — 99291 CRITICAL CARE FIRST HOUR: CPT | Mod: ,,, | Performed by: STUDENT IN AN ORGANIZED HEALTH CARE EDUCATION/TRAINING PROGRAM

## 2023-04-21 PROCEDURE — 27000221 HC OXYGEN, UP TO 24 HOURS

## 2023-04-21 PROCEDURE — 99285 EMERGENCY DEPT VISIT HI MDM: CPT | Mod: 25

## 2023-04-21 PROCEDURE — 80307 DRUG TEST PRSMV CHEM ANLYZR: CPT | Performed by: STUDENT IN AN ORGANIZED HEALTH CARE EDUCATION/TRAINING PROGRAM

## 2023-04-21 PROCEDURE — 83605 ASSAY OF LACTIC ACID: CPT

## 2023-04-21 PROCEDURE — 85025 COMPLETE CBC W/AUTO DIFF WBC: CPT | Performed by: STUDENT IN AN ORGANIZED HEALTH CARE EDUCATION/TRAINING PROGRAM

## 2023-04-21 PROCEDURE — 80053 COMPREHEN METABOLIC PANEL: CPT | Performed by: STUDENT IN AN ORGANIZED HEALTH CARE EDUCATION/TRAINING PROGRAM

## 2023-04-21 PROCEDURE — 93005 ELECTROCARDIOGRAM TRACING: CPT

## 2023-04-21 RX ORDER — HEPARIN SODIUM 5000 [USP'U]/ML
5000 INJECTION, SOLUTION INTRAVENOUS; SUBCUTANEOUS EVERY 8 HOURS
Status: DISCONTINUED | OUTPATIENT
Start: 2023-04-22 | End: 2023-04-24

## 2023-04-21 RX ORDER — SODIUM CHLORIDE 0.9 % (FLUSH) 0.9 %
10 SYRINGE (ML) INJECTION
Status: DISCONTINUED | OUTPATIENT
Start: 2023-04-22 | End: 2023-04-26

## 2023-04-21 RX ORDER — DEXTROSE MONOHYDRATE 50 MG/ML
INJECTION, SOLUTION INTRAVENOUS CONTINUOUS
Status: DISCONTINUED | OUTPATIENT
Start: 2023-04-22 | End: 2023-04-22

## 2023-04-21 RX ADMIN — SODIUM CHLORIDE, SODIUM LACTATE, POTASSIUM CHLORIDE, AND CALCIUM CHLORIDE 1000 ML: .6; .31; .03; .02 INJECTION, SOLUTION INTRAVENOUS at 08:04

## 2023-04-21 RX ADMIN — VANCOMYCIN HYDROCHLORIDE 2000 MG: 500 INJECTION, POWDER, LYOPHILIZED, FOR SOLUTION INTRAVENOUS at 08:04

## 2023-04-21 RX ADMIN — SODIUM CHLORIDE, SODIUM LACTATE, POTASSIUM CHLORIDE, AND CALCIUM CHLORIDE 1974 ML: .6; .31; .03; .02 INJECTION, SOLUTION INTRAVENOUS at 08:04

## 2023-04-21 RX ADMIN — PIPERACILLIN SODIUM AND TAZOBACTAM SODIUM 4.5 G: 4; .5 INJECTION, POWDER, FOR SOLUTION INTRAVENOUS at 08:04

## 2023-04-22 LAB
ALBUMIN SERPL BCP-MCNC: 2 G/DL (ref 3.5–5.2)
ALLENS TEST: NORMAL
ALP SERPL-CCNC: 56 U/L (ref 55–135)
ALT SERPL W/O P-5'-P-CCNC: 33 U/L (ref 10–44)
ANION GAP SERPL CALC-SCNC: 10 MMOL/L (ref 8–16)
ANION GAP SERPL CALC-SCNC: 10 MMOL/L (ref 8–16)
ANION GAP SERPL CALC-SCNC: 11 MMOL/L (ref 8–16)
ANION GAP SERPL CALC-SCNC: 12 MMOL/L (ref 8–16)
ANION GAP SERPL CALC-SCNC: 12 MMOL/L (ref 8–16)
AST SERPL-CCNC: 33 U/L (ref 10–40)
BASOPHILS # BLD AUTO: 0.07 K/UL (ref 0–0.2)
BASOPHILS # BLD AUTO: 0.08 K/UL (ref 0–0.2)
BASOPHILS NFR BLD: 0.5 % (ref 0–1.9)
BASOPHILS NFR BLD: 0.5 % (ref 0–1.9)
BILIRUB SERPL-MCNC: 0.5 MG/DL (ref 0.1–1)
BUN SERPL-MCNC: 31 MG/DL (ref 8–23)
BUN SERPL-MCNC: 36 MG/DL (ref 8–23)
BUN SERPL-MCNC: 42 MG/DL (ref 8–23)
BUN SERPL-MCNC: 48 MG/DL (ref 8–23)
BUN SERPL-MCNC: 53 MG/DL (ref 8–23)
CALCIUM SERPL-MCNC: 8.7 MG/DL (ref 8.7–10.5)
CALCIUM SERPL-MCNC: 8.7 MG/DL (ref 8.7–10.5)
CALCIUM SERPL-MCNC: 8.8 MG/DL (ref 8.7–10.5)
CALCIUM SERPL-MCNC: 8.9 MG/DL (ref 8.7–10.5)
CALCIUM SERPL-MCNC: 9 MG/DL (ref 8.7–10.5)
CHLORIDE SERPL-SCNC: 118 MMOL/L (ref 95–110)
CHLORIDE SERPL-SCNC: 119 MMOL/L (ref 95–110)
CO2 SERPL-SCNC: 23 MMOL/L (ref 23–29)
CO2 SERPL-SCNC: 27 MMOL/L (ref 23–29)
CO2 SERPL-SCNC: 28 MMOL/L (ref 23–29)
CREAT SERPL-MCNC: 1 MG/DL (ref 0.5–1.4)
CREAT SERPL-MCNC: 1.2 MG/DL (ref 0.5–1.4)
CREAT SERPL-MCNC: 1.3 MG/DL (ref 0.5–1.4)
CREAT SERPL-MCNC: 1.3 MG/DL (ref 0.5–1.4)
CREAT SERPL-MCNC: 1.7 MG/DL (ref 0.5–1.4)
CRP SERPL-MCNC: 134.5 MG/L (ref 0–8.2)
DIFFERENTIAL METHOD: ABNORMAL
DIFFERENTIAL METHOD: ABNORMAL
EOSINOPHIL # BLD AUTO: 0 K/UL (ref 0–0.5)
EOSINOPHIL # BLD AUTO: 0 K/UL (ref 0–0.5)
EOSINOPHIL NFR BLD: 0.1 % (ref 0–8)
EOSINOPHIL NFR BLD: 0.1 % (ref 0–8)
ERYTHROCYTE [DISTWIDTH] IN BLOOD BY AUTOMATED COUNT: 14.9 % (ref 11.5–14.5)
ERYTHROCYTE [DISTWIDTH] IN BLOOD BY AUTOMATED COUNT: 15.1 % (ref 11.5–14.5)
ERYTHROCYTE [SEDIMENTATION RATE] IN BLOOD BY PHOTOMETRIC METHOD: >120 MM/HR (ref 0–36)
EST. GFR  (NO RACE VARIABLE): 31.7 ML/MIN/1.73 M^2
EST. GFR  (NO RACE VARIABLE): 43.7 ML/MIN/1.73 M^2
EST. GFR  (NO RACE VARIABLE): 43.7 ML/MIN/1.73 M^2
EST. GFR  (NO RACE VARIABLE): 48.1 ML/MIN/1.73 M^2
EST. GFR  (NO RACE VARIABLE): 59.9 ML/MIN/1.73 M^2
GLUCOSE SERPL-MCNC: 102 MG/DL (ref 70–110)
GLUCOSE SERPL-MCNC: 122 MG/DL (ref 70–110)
GLUCOSE SERPL-MCNC: 131 MG/DL (ref 70–110)
GLUCOSE SERPL-MCNC: 132 MG/DL (ref 70–110)
GLUCOSE SERPL-MCNC: 138 MG/DL (ref 70–110)
HCT VFR BLD AUTO: 36.3 % (ref 37–48.5)
HCT VFR BLD AUTO: 37 % (ref 37–48.5)
HGB BLD-MCNC: 10.4 G/DL (ref 12–16)
HGB BLD-MCNC: 10.8 G/DL (ref 12–16)
IMM GRANULOCYTES # BLD AUTO: 0.1 K/UL (ref 0–0.04)
IMM GRANULOCYTES # BLD AUTO: 0.13 K/UL (ref 0–0.04)
IMM GRANULOCYTES NFR BLD AUTO: 0.7 % (ref 0–0.5)
IMM GRANULOCYTES NFR BLD AUTO: 0.9 % (ref 0–0.5)
LDH SERPL L TO P-CCNC: 1.95 MMOL/L (ref 0.5–2.2)
LYMPHOCYTES # BLD AUTO: 2.1 K/UL (ref 1–4.8)
LYMPHOCYTES # BLD AUTO: 2.1 K/UL (ref 1–4.8)
LYMPHOCYTES NFR BLD: 14.5 % (ref 18–48)
LYMPHOCYTES NFR BLD: 14.8 % (ref 18–48)
MAGNESIUM SERPL-MCNC: 2.9 MG/DL (ref 1.6–2.6)
MCH RBC QN AUTO: 28 PG (ref 27–31)
MCH RBC QN AUTO: 28.6 PG (ref 27–31)
MCHC RBC AUTO-ENTMCNC: 28.7 G/DL (ref 32–36)
MCHC RBC AUTO-ENTMCNC: 29.2 G/DL (ref 32–36)
MCV RBC AUTO: 98 FL (ref 82–98)
MCV RBC AUTO: 98 FL (ref 82–98)
MONOCYTES # BLD AUTO: 1.4 K/UL (ref 0.3–1)
MONOCYTES # BLD AUTO: 1.6 K/UL (ref 0.3–1)
MONOCYTES NFR BLD: 10.7 % (ref 4–15)
MONOCYTES NFR BLD: 9.9 % (ref 4–15)
MRSA ID BY PCR: NEGATIVE
NEUTROPHILS # BLD AUTO: 10.5 K/UL (ref 1.8–7.7)
NEUTROPHILS # BLD AUTO: 10.8 K/UL (ref 1.8–7.7)
NEUTROPHILS NFR BLD: 73.3 % (ref 38–73)
NEUTROPHILS NFR BLD: 74 % (ref 38–73)
NRBC BLD-RTO: 0 /100 WBC
NRBC BLD-RTO: 0 /100 WBC
PHOSPHATE SERPL-MCNC: 3.5 MG/DL (ref 2.7–4.5)
PLATELET # BLD AUTO: 329 K/UL (ref 150–450)
PLATELET # BLD AUTO: 333 K/UL (ref 150–450)
PMV BLD AUTO: 10.9 FL (ref 9.2–12.9)
PMV BLD AUTO: 11.1 FL (ref 9.2–12.9)
POCT GLUCOSE: 147 MG/DL (ref 70–110)
POTASSIUM SERPL-SCNC: 3.7 MMOL/L (ref 3.5–5.1)
POTASSIUM SERPL-SCNC: 3.9 MMOL/L (ref 3.5–5.1)
POTASSIUM SERPL-SCNC: 4.1 MMOL/L (ref 3.5–5.1)
POTASSIUM SERPL-SCNC: 4.1 MMOL/L (ref 3.5–5.1)
POTASSIUM SERPL-SCNC: 4.3 MMOL/L (ref 3.5–5.1)
PROT SERPL-MCNC: 6.8 G/DL (ref 6–8.4)
RBC # BLD AUTO: 3.71 M/UL (ref 4–5.4)
RBC # BLD AUTO: 3.77 M/UL (ref 4–5.4)
SAMPLE: NORMAL
SITE: NORMAL
SODIUM SERPL-SCNC: 153 MMOL/L (ref 136–145)
SODIUM SERPL-SCNC: 156 MMOL/L (ref 136–145)
SODIUM SERPL-SCNC: 156 MMOL/L (ref 136–145)
SODIUM SERPL-SCNC: 158 MMOL/L (ref 136–145)
SODIUM SERPL-SCNC: 158 MMOL/L (ref 136–145)
STAPH AUREUS ID BY PCR: NEGATIVE
VANCOMYCIN SERPL-MCNC: 4.3 UG/ML
WBC # BLD AUTO: 14.11 K/UL (ref 3.9–12.7)
WBC # BLD AUTO: 14.74 K/UL (ref 3.9–12.7)

## 2023-04-22 PROCEDURE — 99291 PR CRITICAL CARE, E/M 30-74 MINUTES: ICD-10-PCS | Mod: ,,, | Performed by: NURSE PRACTITIONER

## 2023-04-22 PROCEDURE — 94761 N-INVAS EAR/PLS OXIMETRY MLT: CPT

## 2023-04-22 PROCEDURE — 25000003 PHARM REV CODE 250: Performed by: INTERNAL MEDICINE

## 2023-04-22 PROCEDURE — 63600175 PHARM REV CODE 636 W HCPCS: Performed by: STUDENT IN AN ORGANIZED HEALTH CARE EDUCATION/TRAINING PROGRAM

## 2023-04-22 PROCEDURE — 99291 CRITICAL CARE FIRST HOUR: CPT | Mod: ,,, | Performed by: NURSE PRACTITIONER

## 2023-04-22 PROCEDURE — 63600175 PHARM REV CODE 636 W HCPCS: Performed by: INTERNAL MEDICINE

## 2023-04-22 PROCEDURE — 99900031 HC PATIENT EDUCATION (STAT)

## 2023-04-22 PROCEDURE — 83735 ASSAY OF MAGNESIUM: CPT

## 2023-04-22 PROCEDURE — 25000003 PHARM REV CODE 250

## 2023-04-22 PROCEDURE — 80048 BASIC METABOLIC PNL TOTAL CA: CPT | Mod: 91,XB | Performed by: INTERNAL MEDICINE

## 2023-04-22 PROCEDURE — 85025 COMPLETE CBC W/AUTO DIFF WBC: CPT

## 2023-04-22 PROCEDURE — 11000001 HC ACUTE MED/SURG PRIVATE ROOM

## 2023-04-22 PROCEDURE — 84100 ASSAY OF PHOSPHORUS: CPT

## 2023-04-22 PROCEDURE — 63600175 PHARM REV CODE 636 W HCPCS

## 2023-04-22 PROCEDURE — 85025 COMPLETE CBC W/AUTO DIFF WBC: CPT | Mod: 91

## 2023-04-22 PROCEDURE — 83605 ASSAY OF LACTIC ACID: CPT

## 2023-04-22 PROCEDURE — 80048 BASIC METABOLIC PNL TOTAL CA: CPT | Mod: 91,XB

## 2023-04-22 PROCEDURE — 25000003 PHARM REV CODE 250: Performed by: STUDENT IN AN ORGANIZED HEALTH CARE EDUCATION/TRAINING PROGRAM

## 2023-04-22 PROCEDURE — 25500020 PHARM REV CODE 255: Performed by: INTERNAL MEDICINE

## 2023-04-22 PROCEDURE — 36415 COLL VENOUS BLD VENIPUNCTURE: CPT | Performed by: INTERNAL MEDICINE

## 2023-04-22 PROCEDURE — 99900035 HC TECH TIME PER 15 MIN (STAT)

## 2023-04-22 PROCEDURE — 87040 BLOOD CULTURE FOR BACTERIA: CPT | Mod: 59 | Performed by: INTERNAL MEDICINE

## 2023-04-22 PROCEDURE — 82803 BLOOD GASES ANY COMBINATION: CPT

## 2023-04-22 PROCEDURE — A9585 GADOBUTROL INJECTION: HCPCS | Performed by: INTERNAL MEDICINE

## 2023-04-22 PROCEDURE — 80202 ASSAY OF VANCOMYCIN: CPT | Performed by: INTERNAL MEDICINE

## 2023-04-22 PROCEDURE — 80053 COMPREHEN METABOLIC PANEL: CPT

## 2023-04-22 RX ORDER — INSULIN ASPART 100 [IU]/ML
0-5 INJECTION, SOLUTION INTRAVENOUS; SUBCUTANEOUS EVERY 6 HOURS PRN
Status: DISCONTINUED | OUTPATIENT
Start: 2023-04-22 | End: 2023-04-26

## 2023-04-22 RX ORDER — MUPIROCIN 20 MG/G
OINTMENT TOPICAL 2 TIMES DAILY
Status: COMPLETED | OUTPATIENT
Start: 2023-04-22 | End: 2023-04-26

## 2023-04-22 RX ORDER — GLUCAGON 1 MG
1 KIT INJECTION
Status: DISCONTINUED | OUTPATIENT
Start: 2023-04-22 | End: 2023-04-26

## 2023-04-22 RX ORDER — DEXTROSE MONOHYDRATE 50 MG/ML
INJECTION, SOLUTION INTRAVENOUS CONTINUOUS
Status: DISCONTINUED | OUTPATIENT
Start: 2023-04-22 | End: 2023-04-25

## 2023-04-22 RX ORDER — GADOBUTROL 604.72 MG/ML
10 INJECTION INTRAVENOUS
Status: COMPLETED | OUTPATIENT
Start: 2023-04-22 | End: 2023-04-22

## 2023-04-22 RX ADMIN — PIPERACILLIN SODIUM AND TAZOBACTAM SODIUM 4.5 G: 4; .5 INJECTION, POWDER, FOR SOLUTION INTRAVENOUS at 12:04

## 2023-04-22 RX ADMIN — PIPERACILLIN SODIUM AND TAZOBACTAM SODIUM 4.5 G: 4; .5 INJECTION, POWDER, FOR SOLUTION INTRAVENOUS at 09:04

## 2023-04-22 RX ADMIN — HEPARIN SODIUM 5000 UNITS: 5000 INJECTION INTRAVENOUS; SUBCUTANEOUS at 06:04

## 2023-04-22 RX ADMIN — DEXTROSE: 5 SOLUTION INTRAVENOUS at 12:04

## 2023-04-22 RX ADMIN — GADOBUTROL 10 ML: 604.72 INJECTION INTRAVENOUS at 12:04

## 2023-04-22 RX ADMIN — MUPIROCIN: 20 OINTMENT TOPICAL at 08:04

## 2023-04-22 RX ADMIN — DEXTROSE MONOHYDRATE: 50 INJECTION, SOLUTION INTRAVENOUS at 02:04

## 2023-04-22 RX ADMIN — MUPIROCIN: 20 OINTMENT TOPICAL at 09:04

## 2023-04-22 RX ADMIN — HEPARIN SODIUM 5000 UNITS: 5000 INJECTION INTRAVENOUS; SUBCUTANEOUS at 03:04

## 2023-04-22 RX ADMIN — VANCOMYCIN HYDROCHLORIDE 1750 MG: 750 INJECTION, POWDER, LYOPHILIZED, FOR SOLUTION INTRAVENOUS at 05:04

## 2023-04-22 RX ADMIN — PIPERACILLIN SODIUM AND TAZOBACTAM SODIUM 4.5 G: 4; .5 INJECTION, POWDER, FOR SOLUTION INTRAVENOUS at 04:04

## 2023-04-22 RX ADMIN — HEPARIN SODIUM 5000 UNITS: 5000 INJECTION INTRAVENOUS; SUBCUTANEOUS at 09:04

## 2023-04-22 NOTE — HOSPITAL COURSE
Admitted to MICU for AMS & hypernatremia (admission Na 162, calculated free water deficit 4.6 L).  Improved s/p D5 rehydration.  No vasopressor or higher ventilatory support needed; otherwise stable for step-down from MICU.

## 2023-04-22 NOTE — PROGRESS NOTES
Pharmacokinetic Initial Assessment: IV Vancomycin    Assessment/Plan:    Initiate intravenous vancomycin with loading dose of 2000 mg once, done in ED.  Since SCr increased from baseline, check random vancomycin level 12-24 hours after initial dose to determine if scheduling subsequent doses appropriate or if intermittent dosing indicated.   Desired empiric serum trough concentration is 10 to 20 mcg/mL.  Draw vancomycin random level on 04/22/2023 at 0900.  Pharmacy will continue to follow and monitor vancomycin.      Please contact pharmacy at extension [phone] with any questions regarding this assessment.     Thank you for the consult,   Velia Hedrick       Patient brief summary:  Miranda Houston is a 72 y.o. female initiated on antimicrobial therapy with IV Vancomycin for treatment of suspected skin & soft tissue infection.    Drug Allergies:   Review of patient's allergies indicates:   Allergen Reactions    No known drug allergies        Actual Body Weight:   65.8 kg    Renal Function:   Estimated Creatinine Clearance: 26.4 mL/min (A) (based on SCr of 1.8 mg/dL (H)).     CBC (last 72 hours):  Recent Labs   Lab Result Units 04/21/23 2037   WBC K/uL 14.46*   Hemoglobin g/dL 13.1   Hematocrit % 44.2   Platelets K/uL 385   Gran % % 71.4   Lymph % % 17.4*   Mono % % 10.2   Eosinophil % % 0.0   Basophil % % 0.4   Differential Method  Automated       Metabolic Panel (last 72 hours):  Recent Labs   Lab Result Units 04/21/23 2037 04/21/23 2046   Sodium mmol/L 162*  --    Potassium mmol/L 4.3  --    Chloride mmol/L 119*  --    CO2 mmol/L 25  --    Glucose mg/dL 121*  --    Glucose, UA   --  Negative   BUN mg/dL 61*  --    Creatinine mg/dL 1.8*  --    Creatinine, Urine mg/dL  --  254.0   Albumin g/dL 2.5*  --    Total Bilirubin mg/dL 0.5  --    Alkaline Phosphatase U/L 65  --    AST U/L 38  --    ALT U/L 40  --    Magnesium mg/dL 3.2*  --    Phosphorus mg/dL 4.2  --        Drug levels (last 3 results):  No results  for input(s): VANCOMYCINRA, VANCORANDOM, VANCOMYCINPE, VANCOPEAK, VANCOMYCINTR, VANCOTROUGH in the last 72 hours.    Microbiologic Results:  Microbiology Results (last 7 days)       Procedure Component Value Units Date/Time    Blood culture x two cultures. Draw prior to antibiotics. [[id number]] Collected: 04/21/23 2038    Order Status: Sent Specimen: Blood from Peripheral, Antecubital, Left Updated: 04/21/23 2044    Blood culture x two cultures. Draw prior to antibiotics. [[id number]] Collected: 04/21/23 2038    Order Status: Sent Specimen: Blood from Peripheral, Antecubital, Left Updated: 04/21/23 2044

## 2023-04-22 NOTE — H&P
"[facility] - Cardiac Medical ICU  Critical Care Medicine  History & Physical    Patient Name: Miranda Houston  MRN: [id number]  Admission Date: 2023  Hospital Length of Stay: 1 days  Code Status: Partial Code  Attending Physician: Nestor Juan*   Primary Care Provider: Sabino Calle MD   Principal Problem: Hypernatremia    Subjective:     HPI:  Ms. Houston is a 72 year old female with PMH notable for advanced dementia (resides at [facility]), HTN, HLD, pyoderma gangrenosum, and chronic rhinitis who presented to [facility] ED via EMS for AMS and "failure to thrive." Per chart review, facility reports poor PO intake and decreased mentation. EMS reports patient was only responsive to painful stimulus.     Initial lab evaluation revealed leukocytosis with WBC 14, stable H/H, sed rate > 120, , Na 162, Cl 119, JOSEFA with BUN/Cr 61/1.8. CT head with no acute intracranial findings. XRAY foot, tib/fib, and ankle without fracture. CXR with small L effusion. In the ED she was given a total of 2.9L IVF and started on broad spectrum abx with infectious work up pending. Toxicology negative.     Critical care consulted for AMS and hypernatremia.          Hospital/ICU Course:  No notes on file     Past Medical History:   Diagnosis Date    Chronic rhinitis     Colon polyps     Hyperlipidemia     Hypertension     Pyoderma gangrenosum     left leg    Steroid-induced diabetes mellitus        Past Surgical History:   Procedure Laterality Date     SECTION      2 surgeries.       Review of patient's allergies indicates:   Allergen Reactions    No known drug allergies        Family History       Problem Relation (Age of Onset)    Cancer Mother, Father (70)    Diabetes Sister    Heart disease Mother    Hypertension Mother, Sister    Seizures Brother          Tobacco Use    Smoking status: Former     Packs/day: 0.25     Years: 7.00     Pack years: 1.75     Types: Cigarettes     Quit date: 1997     " Years since quittin.7    Smokeless tobacco: Never   Substance and Sexual Activity    Alcohol use: Yes     Alcohol/week: 7.0 standard drinks     Types: 7 Glasses of wine per week    Drug use: No    Sexual activity: Not Currently      Review of Systems   Unable to perform ROS: Dementia   Objective:     Vital Signs (Most Recent):  Temp: 97.5 °F (36.4 °C) (23)  Pulse: 74 (23)  Resp: 15 (23)  BP: (!) 117/59 (23)  SpO2: 97 % (23)   Vital Signs (24h Range):  Temp:  [96.6 °F (35.9 °C)-99.4 °F (37.4 °C)] 97.5 °F (36.4 °C)  Pulse:  [74-94] 74  Resp:  [15-23] 15  SpO2:  [97 %-100 %] 97 %  BP: ()/(38-87) 117/59   Weight: 65.8 kg (145 lb)  Body mass index is 24.89 kg/m².      Intake/Output Summary (Last 24 hours) at 2023 0033  Last data filed at 2023 2348  Gross per 24 hour   Intake 3474 ml   Output --   Net 3474 ml       Physical Exam  Vitals reviewed.   Constitutional:       General: She is not in acute distress.     Appearance: She is ill-appearing.   HENT:      Mouth/Throat:      Mouth: Mucous membranes are dry.      Pharynx: Oropharynx is clear. No oropharyngeal exudate.   Eyes:      General: No scleral icterus.     Pupils: Pupils are equal, round, and reactive to light.   Cardiovascular:      Rate and Rhythm: Normal rate and regular rhythm.      Pulses: Normal pulses.      Heart sounds: No murmur heard.  Pulmonary:      Effort: Pulmonary effort is normal. No respiratory distress.   Abdominal:      General: Bowel sounds are normal. There is no distension.      Palpations: Abdomen is soft.      Tenderness: There is no abdominal tenderness.   Musculoskeletal:      Right lower leg: No edema.      Left lower leg: No edema.   Skin:     Comments: LLE wound   Neurological:      GCS: GCS eye subscore is 4. GCS verbal subscore is 1. GCS motor subscore is 5.       Vents:     Lines/Drains/Airways       Peripheral Intravenous Line  Duration                   Peripheral IV - Single Lumen 04/21/23 2046 20 G Left Antecubital <1 day         Peripheral IV - Single Lumen 04/21/23 2047 20 G Right Antecubital <1 day                  Significant Labs:    CBC/Anemia Profile:  Recent Labs   Lab 04/21/23 2037   WBC 14.46*   HGB 13.1   HCT 44.2      MCV 97   RDW 15.1*        Chemistries:  Recent Labs   Lab 04/21/23 2037 04/21/23  2356   * 158*   K 4.3 4.3   * 119*   CO2 25 27   BUN 61* 53*   CREATININE 1.8* 1.7*   CALCIUM 9.4 8.9   ALBUMIN 2.5*  --    PROT 8.5*  --    BILITOT 0.5  --    ALKPHOS 65  --    ALT 40  --    AST 38  --    MG 3.2*  --    PHOS 4.2  --        All pertinent labs within the past 24 hours have been reviewed.    Significant Imaging: I have reviewed all pertinent imaging results/findings within the past 24 hours.    Assessment/Plan:     Neuro  Dementia  -- Holding home Donepezil and Namenda given AMS. Will resume when clinically able.    Derm  Pyoderma gangrenosum    Concern for infection in left lower extremity. WBC elevated 14.46    - f/u ESR, CRP  - F/U MRI of L forefoot and tibula/fibula with contrast. Will await GFR > 30 before proceeding w contrast  - Continue broad spectrum antibiotics - vancomycin and zosyn  - f/u culture data      Cardiac/Vascular  Essential hypertension  Initially hypotensive on admission, responsive to fluid bolus. Patient remains normotensive.    - Hold antihypertensive medications at this time. Will resume as clinically indicated and as renal function allows.    Renal/  Hypernatremia  Sodium 162 on admission with poor PO Intake per [facility]. Given 1.9L IVF in total in ED and then started on continuous IVF     -- Continue D5  -- goal correction no less than 152   -- BMP q3h           Critical Care Daily Checklist:    A: Awake: RASS Goal/Actual Goal:    Actual:     B: Spontaneous Breathing Trial Performed?     C: SAT & SBT Coordinated?  N/A                      D: Delirium: CAM-ICU     E: Early Mobility  Performed? No   F: Feeding Goal:    Status:     Current Diet Order   Procedures    Diet NPO      AS: Analgesia/Sedation PRN    T: Thromboembolic Prophylaxis SCD   H: HOB > 300 Yes   U: Stress Ulcer Prophylaxis (if needed)    G: Glucose Control < 180 goal    B: Bowel Function     I: Indwelling Catheter (Lines & Moreno) Necessity PIV   D: De-escalation of Antimicrobials/Pharmacotherapies Follow infectious work up     Plan for the day/ETD Admit to hyperNa    Code Status:  Family/Goals of Care: Partial Code       Critical Care Time: 50 minutes    Plan of care discussed with PCCM fellow. To be discussed with Dr. Gan and attestation to follow.    Critical secondary to Patient has a condition that poses threat to life and bodily function: hypernatremia and altered mental status      Critical care was time spent personally by me on the following activities: development of treatment plan with patient or surrogate and bedside caregivers, discussions with consultants, evaluation of patient's response to treatment, examination of patient, ordering and performing treatments and interventions, ordering and review of laboratory studies, ordering and review of radiographic studies, pulse oximetry, re-evaluation of patient's condition. This critical care time did not overlap with that of any other provider or involve time for any procedures.     Gaudencio Flynn, NP  Critical Care Medicine  [facility] - Cardiac Medical ICU

## 2023-04-22 NOTE — PLAN OF CARE
MAP stayed above 65 tonight without need for pressors.  D5W off as of 6:20 per MD order. No acute events overnight.    Problem: Adult Inpatient Plan of Care  Goal: Plan of Care Review  Outcome: Ongoing, Progressing  Goal: Patient-Specific Goal (Individualized)  Outcome: Ongoing, Progressing  Goal: Absence of Hospital-Acquired Illness or Injury  Outcome: Ongoing, Progressing  Goal: Optimal Comfort and Wellbeing  Outcome: Ongoing, Progressing  Goal: Readiness for Transition of Care  Outcome: Ongoing, Progressing     Problem: Impaired Wound Healing  Goal: Optimal Wound Healing  Outcome: Ongoing, Progressing     Problem: Skin Injury Risk Increased  Goal: Skin Health and Integrity  Outcome: Ongoing, Progressing     Problem: Fall Injury Risk  Goal: Absence of Fall and Fall-Related Injury  Outcome: Ongoing, Progressing     Problem: Infection  Goal: Absence of Infection Signs and Symptoms  Outcome: Ongoing, Progressing

## 2023-04-22 NOTE — RESIDENT HANDOFF
"Transfer of Care Note  Critical Care to Hospital Medicine    Admit Date: 4/21/2023  LOS: 1    CC: Hypernatremia    Code Status: Partial Code    Transfer to Hospital Medicine Team B discussed with Andres Dukes III, MD.    Subjective:     HPI: Ms. Houston is a 72 year old female with PMH notable for advanced dementia (resides at [facility]), HTN, HLD, pyoderma gangrenosum, and chronic rhinitis who presented to [facility] ED via EMS for AMS and "failure to thrive." Per chart review, facility reports poor PO intake and decreased mentation. EMS reports patient was only responsive to painful stimulus.     Initial lab evaluation revealed leukocytosis with WBC 14, stable H/H, sed rate > 120, , Na 162, Cl 119, JOSEFA with BUN/Cr 61/1.8. CT head with no acute intracranial findings. XRAY foot, tib/fib, and ankle without fracture. CXR with small L effusion. In the ED she was given a total of 2.9L IVF and started on broad spectrum abx with infectious work up pending. Toxicology negative.     Critical care consulted for AMS and hypernatremia.        Hospital/ICU Course: Admitted to MICU for AMS & hypernatremia (admission Na 162, calculated free water deficit 4.6 L).  Improved s/p D5 rehydration.  No vasopressor or higher ventilatory support needed; otherwise stable for step-down from MICU.    Medically stable for stepdown to  for further management.    To Follow Up:     - f/u Na; currently on BMP q.3. w/ Na trending down at good rate; avoid overcorrection (not < 150-152 in 24 hrs)  - f/u MRI LLE to assess for cellulitis vs OM    Discharge Plan:     - TBD; SNF? NH?    Call with any questions.  [id number]   "

## 2023-04-22 NOTE — ASSESSMENT & PLAN NOTE
Concern for infection in left lower extremity. WBC elevated 14.46    - f/u ESR, CRP  - F/U MRI of L forefoot and tibula/fibula with contrast. Will await GFR > 30 before proceeding w contrast  - Continue broad spectrum antibiotics - vancomycin and zosyn  - f/u culture data

## 2023-04-22 NOTE — SUBJECTIVE & OBJECTIVE
Past Medical History:   Diagnosis Date    Chronic rhinitis     Colon polyps     Hyperlipidemia     Hypertension     Pyoderma gangrenosum     left leg    Steroid-induced diabetes mellitus        Past Surgical History:   Procedure Laterality Date     SECTION      2 surgeries.       Review of patient's allergies indicates:   Allergen Reactions    No known drug allergies        Family History       Problem Relation (Age of Onset)    Cancer Mother, Father (70)    Diabetes Sister    Heart disease Mother    Hypertension Mother, Sister    Seizures Brother          Tobacco Use    Smoking status: Former     Packs/day: 0.25     Years: 7.00     Pack years: 1.75     Types: Cigarettes     Quit date: 1997     Years since quittin.7    Smokeless tobacco: Never   Substance and Sexual Activity    Alcohol use: Yes     Alcohol/week: 7.0 standard drinks     Types: 7 Glasses of wine per week    Drug use: No    Sexual activity: Not Currently      Review of Systems   Unable to perform ROS: Dementia   Objective:     Vital Signs (Most Recent):  Temp: 97.5 °F (36.4 °C) (23)  Pulse: 74 (23 234)  Resp: 15 (23)  BP: (!) 117/59 (23)  SpO2: 97 % (23)   Vital Signs (24h Range):  Temp:  [96.6 °F (35.9 °C)-99.4 °F (37.4 °C)] 97.5 °F (36.4 °C)  Pulse:  [74-94] 74  Resp:  [15-23] 15  SpO2:  [97 %-100 %] 97 %  BP: ()/(38-87) 117/59   Weight: 65.8 kg (145 lb)  Body mass index is 24.89 kg/m².      Intake/Output Summary (Last 24 hours) at 2023 0033  Last data filed at 2023 2348  Gross per 24 hour   Intake 3474 ml   Output --   Net 3474 ml       Physical Exam  Vitals reviewed.   Constitutional:       General: She is not in acute distress.     Appearance: She is ill-appearing.   HENT:      Mouth/Throat:      Mouth: Mucous membranes are dry.      Pharynx: Oropharynx is clear. No oropharyngeal exudate.   Eyes:      General: No scleral icterus.     Pupils: Pupils are equal,  round, and reactive to light.   Cardiovascular:      Rate and Rhythm: Normal rate and regular rhythm.      Pulses: Normal pulses.      Heart sounds: No murmur heard.  Pulmonary:      Effort: Pulmonary effort is normal. No respiratory distress.   Abdominal:      General: Bowel sounds are normal. There is no distension.      Palpations: Abdomen is soft.      Tenderness: There is no abdominal tenderness.   Musculoskeletal:      Right lower leg: No edema.      Left lower leg: No edema.   Skin:     Comments: LLE wound   Neurological:      GCS: GCS eye subscore is 4. GCS verbal subscore is 1. GCS motor subscore is 5.       Vents:     Lines/Drains/Airways       Peripheral Intravenous Line  Duration                  Peripheral IV - Single Lumen 04/21/23 2046 20 G Left Antecubital <1 day         Peripheral IV - Single Lumen 04/21/23 2047 20 G Right Antecubital <1 day                  Significant Labs:    CBC/Anemia Profile:  Recent Labs   Lab 04/21/23 2037   WBC 14.46*   HGB 13.1   HCT 44.2      MCV 97   RDW 15.1*        Chemistries:  Recent Labs   Lab 04/21/23 2037 04/21/23  2356   * 158*   K 4.3 4.3   * 119*   CO2 25 27   BUN 61* 53*   CREATININE 1.8* 1.7*   CALCIUM 9.4 8.9   ALBUMIN 2.5*  --    PROT 8.5*  --    BILITOT 0.5  --    ALKPHOS 65  --    ALT 40  --    AST 38  --    MG 3.2*  --    PHOS 4.2  --        All pertinent labs within the past 24 hours have been reviewed.    Significant Imaging: I have reviewed all pertinent imaging results/findings within the past 24 hours.

## 2023-04-22 NOTE — ASSESSMENT & PLAN NOTE
Initially hypotensive on admission, responsive to fluid bolus. Patient remains normotensive.    - Hold antihypertensive medications at this time. Will resume as clinically indicated and as renal function allows.

## 2023-04-22 NOTE — CONSULTS
Patient seen and evaluated by critical care medicine. To be admitted to ICU for further management. Full H&P to follow.     THUY Coe, [facility]  Pulmonary Critical Care Medicine   04/21/2023

## 2023-04-22 NOTE — HPI
"Ms. Houston is a 72 year old female with PMH notable for advanced dementia (resides at [facility]), HTN, HLD, pyoderma gangrenosum, and chronic rhinitis who presented to [facility] ED via EMS for AMS and "failure to thrive." Per chart review, facility reports poor PO intake and decreased mentation. EMS reports patient was only responsive to painful stimulus.     Initial lab evaluation revealed leukocytosis with WBC 14, stable H/H, sed rate > 120, , Na 162, Cl 119, JOSEFA with BUN/Cr 61/1.8. CT head with no acute intracranial findings. XRAY foot, tib/fib, and ankle without fracture. CXR with small L effusion. In the ED she was given a total of 2.9L IVF and started on broad spectrum abx with infectious work up pending. Toxicology negative.     Critical care consulted for AMS and hypernatremia.      "

## 2023-04-22 NOTE — ASSESSMENT & PLAN NOTE
Sodium 162 on admission with poor PO Intake per [facility]. Given 1.9L IVF in total in ED and then started on continuous IVF     -- Continue D5  -- goal correction no less than 152   -- BMP q3h

## 2023-04-22 NOTE — PROGRESS NOTES
Pharmacokinetic Assessment Follow Up: IV Vancomycin    Vancomycin Regimen Assessment & Plan  - Vancomycin level drawn ~13 after vancomycin 2000 mg IV x1 loading dose resulted as 4.3 mcg/mL.  - JOSEFA appears resolving, down trending SCr and good UOP. Changing from pulse dosing by levels to vancomycin 1750 mg IV x1, followed by 1250 mg IV q12h.  - Draw trough on 4/23 @1100 prior to 3rd dose of this regimen, goal 10-20 mcg/mL.    Drug levels (last 3 results):  Recent Labs   Lab Result Units 04/22/23  0952   Vancomycin, Random ug/mL 4.3     Pharmacy will continue to follow and monitor vancomycin.    Please contact pharmacy at extension [phone] for questions regarding this assessment.    Thank you for the consult,   Andres Muñiz     Patient brief summary:  Miranda Houston is a 72 y.o. female initiated on antimicrobial therapy with IV Vancomycin for treatment of skin & soft tissue infection    Drug Allergies:   Review of patient's allergies indicates:   Allergen Reactions    No known drug allergies      Actual Body Weight:   88.2 kg    Renal Function:   Estimated Creatinine Clearance: 42.1 mL/min (based on SCr of 1.3 mg/dL).,     Dialysis Method (if applicable):  N/A

## 2023-04-22 NOTE — ED PROVIDER NOTES
"Source of History:   Patient, and medical record, without language barrier or      Chief complaint:  Failure To Thrive (Pt coming from [facility] for decreased mental status and failure to thrive. Per EMS faculty state pt has been not eating and not responding normally for several days. Per EMS pt did respond to pain but non-verbal. History of dementia )    HPI:  Miranda Houston is a 72 y.o. female with history of dementia, pyoderma gangrenosum presenting with chief complaint of failure to thrive.  Patient was brought in from [facility] for decreased mental status and low blood pressures.  Reportedly patient has not been eating drinking or responding appropriately.  Per EMS patient was only responding to painful stimuli and not to verbal stimuli    This is the extent to the patients complaints today here in the emergency department.    ROS: As per HPI and below:  ROS    Review of patient's allergies indicates:   Allergen Reactions    No known drug allergies      PMH:  As per HPI and below:  Past Medical History:   Diagnosis Date    Chronic rhinitis     Colon polyps     Hyperlipidemia     Hypertension     Pyoderma gangrenosum     left leg    Steroid-induced diabetes mellitus      Past Surgical History:   Procedure Laterality Date     SECTION      2 surgeries.     Social History     Tobacco Use    Smoking status: Former     Packs/day: 0.25     Years: 7.00     Pack years: 1.75     Types: Cigarettes     Quit date: 1997     Years since quittin.7    Smokeless tobacco: Never   Substance Use Topics    Alcohol use: Yes     Alcohol/week: 7.0 standard drinks     Types: 7 Glasses of wine per week    Drug use: No     Vitals:    /60   Pulse 84   Temp 97.7 °F (36.5 °C)   Resp (!) 21   Ht 5' 4" (1.626 m)   Wt 65.8 kg (145 lb)   SpO2 100%   BMI 24.89 kg/m²     Physical Exam  Vitals and nursing note reviewed.   Constitutional:       Appearance: She is underweight. She is " ill-appearing. She is not toxic-appearing.   HENT:      Head: Normocephalic and atraumatic.      Mouth/Throat:      Mouth: Mucous membranes are moist.   Eyes:      General: No scleral icterus.  Cardiovascular:      Rate and Rhythm: Normal rate and regular rhythm.      Pulses: Normal pulses.      Heart sounds: Normal heart sounds. No murmur heard.    No friction rub. No gallop.      Comments: Hypotensive  Pulmonary:      Effort: Pulmonary effort is normal. No respiratory distress.      Breath sounds: Normal breath sounds. No stridor. No wheezing, rhonchi or rales.      Comments: Labored breathing  Abdominal:      General: Abdomen is flat. There is no distension.      Palpations: Abdomen is soft.      Tenderness: There is no abdominal tenderness. There is no guarding.   Musculoskeletal:         General: No swelling or deformity.      Cervical back: Normal range of motion and neck supple.   Skin:     General: Skin is warm and dry.      Capillary Refill: Capillary refill takes less than 2 seconds.      Coloration: Skin is pale. Skin is not jaundiced.      Findings: Lesion (Large left leg wound with ulceration and drainage) present. No rash.   Neurological:      Mental Status: She is lethargic.     Procedures    Laboratory Studies:  Labs that have been ordered have been independently reviewed and interpreted by myself.  Labs Reviewed   CBC W/ AUTO DIFFERENTIAL - Abnormal; Notable for the following components:       Result Value    WBC 14.46 (*)     MCHC 29.6 (*)     RDW 15.1 (*)     Immature Granulocytes 0.6 (*)     Gran # (ANC) 10.3 (*)     Immature Grans (Abs) 0.09 (*)     Mono # 1.5 (*)     Lymph % 17.4 (*)     All other components within normal limits   COMPREHENSIVE METABOLIC PANEL - Abnormal; Notable for the following components:    Sodium 162 (*)     Chloride 119 (*)     Glucose 121 (*)     BUN 61 (*)     Creatinine 1.8 (*)     Total Protein 8.5 (*)     Albumin 2.5 (*)     Anion Gap 18 (*)     eGFR 29.6 (*)      All other components within normal limits   URINALYSIS, REFLEX TO URINE CULTURE - Abnormal; Notable for the following components:    Protein, UA 1+ (*)     Ketones, UA Trace (*)     All other components within normal limits    Narrative:     Specimen Source->Urine   MAGNESIUM - Abnormal; Notable for the following components:    Magnesium 3.2 (*)     All other components within normal limits   PROCALCITONIN - Abnormal; Notable for the following components:    Procalcitonin 0.28 (*)     All other components within normal limits   URINALYSIS MICROSCOPIC - Abnormal; Notable for the following components:    WBC, UA 9 (*)     Bacteria Few (*)     Hyaline Casts,  (*)     All other components within normal limits    Narrative:     Specimen Source->Urine   ISTAT LACTATE - Abnormal; Notable for the following components:    POC Lactate 3.01 (*)     All other components within normal limits   ISTAT PROCEDURE - Abnormal; Notable for the following components:    POC PCO2 55.2 (*)     POC PO2 18 (*)     POC HCO3 34.6 (*)     POC SATURATED O2 26 (*)     POC TCO2 36 (*)     All other components within normal limits   CULTURE, BLOOD   CULTURE, BLOOD   PHOSPHORUS   B-TYPE NATRIURETIC PEPTIDE   TROPONIN I   TSH   DRUG SCREEN PANEL, URINE EMERGENCY    Narrative:     Specimen Source->Urine   SEDIMENTATION RATE   C-REACTIVE PROTEIN   BASIC METABOLIC PANEL     Imaging Results              CT Head Without Contrast (Final result)  Result time 04/21/23 22:11:57      Final result by Natan Suarez MD (04/21/23 22:11:57)                   Impression:      No evidence of acute intracranial pathology.    Nonacute right orbital floor fracture, similar compared to 03/10/2023.  No acute calvarial fracture.    Electronically signed by resident: Henrique Moreno  Date:    04/21/2023  Time:    21:52    Electronically signed by: Natan Suarez MD  Date:    04/21/2023  Time:    22:11               Narrative:    EXAMINATION:  CT HEAD WITHOUT  CONTRAST    CLINICAL HISTORY:  Mental status change, unknown cause;    TECHNIQUE:  Low dose axial CT images obtained throughout the head without the use of intravenous contrast.  Axial, sagittal and coronal reconstructions were performed.    COMPARISON:  CT head 03/10/2023.    FINDINGS:  Prominence of the ventricles and sulci compatible with mild generalized cerebral volume loss.  No hydrocephalus.    Punctate hypoattenuating focus the left basal ganglia, likely remote lacunar-type infarct versus prominent perivascular space.  Mild patchy hypoattenuation in the supratentorial white matter, nonspecific but most likely reflecting chronic microvascular ischemic changes. No recent or remote major vascular distribution infarct. No acute hemorrhage.  No mass effect or midline shift.    No extra-axial blood or fluid collections.    Right orbital floor fracture, similar to prior.  No acute calvarial fracture.  Frontal sinus aplasia.  Mucous retention cyst in the inferior right maxillary antrum.  Mastoid air cells  are essentially clear.                                       X-Ray Ankle Complete Left (Final result)  Result time 04/21/23 22:08:30      Final result by Natan Suarez MD (04/21/23 22:08:30)                   Impression:      No acute fracture.    Smooth appearing periosteal reaction along the mid to distal shaft left tibia.  Correlate clinically for history of prior insult/injury.  Prior radiographs 04/01/2011 suggested a significant injury to the soft tissues of the lower leg in this region but no subsequent follow-up radiographs are available until today.    Additional findings as above.      Electronically signed by: Natan Suarez MD  Date:    04/21/2023  Time:    22:08               Narrative:    EXAMINATION:  XR ANKLE COMPLETE 3 VIEW LEFT; XR FOOT COMPLETE 3 VIEW LEFT; XR TIBIA FIBULA 2 VIEW LEFT    CLINICAL HISTORY:  Unspecified open wound, left lower leg, initial encounter    TECHNIQUE:  AP, lateral  and oblique views of the left ankle and left foot were performed.  AP and lateral views left tibia and fibula were performed.    COMPARISON:  Knees 03/21/2017.  Left leg 04/01/2011.    FINDINGS:  No fracture or dislocation.  Smooth appearing periosteal reaction along the mid to distal shaft of the left tibia.  Degenerative change left knee similar to 03/21/2017.  Ankle mortise is symmetric.  Talar dome is maintained.  Lisfranc joints appear congruent.  Metatarsus primus varus with hallux valgus.  Mild degenerative change at the 1st MTP joint.  Soft tissue swelling about the ankle, particularly medially, and over the dorsum of the foot.                                       X-Ray Foot Complete Left (Final result)  Result time 04/21/23 22:08:30      Final result by Natan Suarez MD (04/21/23 22:08:30)                   Impression:      No acute fracture.    Smooth appearing periosteal reaction along the mid to distal shaft left tibia.  Correlate clinically for history of prior insult/injury.  Prior radiographs 04/01/2011 suggested a significant injury to the soft tissues of the lower leg in this region but no subsequent follow-up radiographs are available until today.    Additional findings as above.      Electronically signed by: aNtan Suarez MD  Date:    04/21/2023  Time:    22:08               Narrative:    EXAMINATION:  XR ANKLE COMPLETE 3 VIEW LEFT; XR FOOT COMPLETE 3 VIEW LEFT; XR TIBIA FIBULA 2 VIEW LEFT    CLINICAL HISTORY:  Unspecified open wound, left lower leg, initial encounter    TECHNIQUE:  AP, lateral and oblique views of the left ankle and left foot were performed.  AP and lateral views left tibia and fibula were performed.    COMPARISON:  Knees 03/21/2017.  Left leg 04/01/2011.    FINDINGS:  No fracture or dislocation.  Smooth appearing periosteal reaction along the mid to distal shaft of the left tibia.  Degenerative change left knee similar to 03/21/2017.  Ankle mortise is symmetric.  Talar  dome is maintained.  Lisfranc joints appear congruent.  Metatarsus primus varus with hallux valgus.  Mild degenerative change at the 1st MTP joint.  Soft tissue swelling about the ankle, particularly medially, and over the dorsum of the foot.                                       X-Ray Tibia Fibula 2 View Left (Final result)  Result time 04/21/23 22:08:30      Final result by Natan Suarez MD (04/21/23 22:08:30)                   Impression:      No acute fracture.    Smooth appearing periosteal reaction along the mid to distal shaft left tibia.  Correlate clinically for history of prior insult/injury.  Prior radiographs 04/01/2011 suggested a significant injury to the soft tissues of the lower leg in this region but no subsequent follow-up radiographs are available until today.    Additional findings as above.      Electronically signed by: Natan Suarez MD  Date:    04/21/2023  Time:    22:08               Narrative:    EXAMINATION:  XR ANKLE COMPLETE 3 VIEW LEFT; XR FOOT COMPLETE 3 VIEW LEFT; XR TIBIA FIBULA 2 VIEW LEFT    CLINICAL HISTORY:  Unspecified open wound, left lower leg, initial encounter    TECHNIQUE:  AP, lateral and oblique views of the left ankle and left foot were performed.  AP and lateral views left tibia and fibula were performed.    COMPARISON:  Knees 03/21/2017.  Left leg 04/01/2011.    FINDINGS:  No fracture or dislocation.  Smooth appearing periosteal reaction along the mid to distal shaft of the left tibia.  Degenerative change left knee similar to 03/21/2017.  Ankle mortise is symmetric.  Talar dome is maintained.  Lisfranc joints appear congruent.  Metatarsus primus varus with hallux valgus.  Mild degenerative change at the 1st MTP joint.  Soft tissue swelling about the ankle, particularly medially, and over the dorsum of the foot.                                       X-Ray Chest AP Portable (Final result)  Result time 04/21/23 22:14:46      Final result by Mann Dempsey MD  (04/21/23 22:14:46)                   Impression:      Suspected mild left basilar infiltrates superimposed on bilateral basilar atelectatic change and perhaps minimal pleural fluid at the left costophrenic angle.      Electronically signed by: Mann Dempsey  Date:    04/21/2023  Time:    22:14               Narrative:    EXAMINATION:  XR CHEST AP PORTABLE    CLINICAL HISTORY:  ams;    TECHNIQUE:  Single frontal view of the chest was performed.    COMPARISON:  Chest radiograph May 6, 2021    FINDINGS:  Single portable chest view is submitted.  There is diminished depth of inspiration and minimal rotation, when accounting for these factors the appearance of the cardiomediastinal silhouette is thought stable.    There is somewhat curvilinear opacity at the right lung base, this is likely atelectatic change.  The lungs bilaterally demonstrate accentuated attenuation of pulmonary bronchovascular markings consistent with diminished depth of inspiration.    Opacity at the left lung base is thought likely related to components of atelectasis and mild basilar infiltrate there may be minimal pleural fluid at the left costophrenic angle.  There is no large pleural effusion bilaterally and there is no pneumothorax bilaterally.    The osseous structures demonstrate chronic change.                                    EKG (independently interpreted by me): Rhythm: nsr, rate of  86 BPM, no ST elevations or depressions, QTc 483    Imaging (independently interpreted by me):  Mild left basilar infiltrate    I decided to obtain the patient's medical records.  Summary of Medical Records:    Medications   piperacillin-tazobactam (ZOSYN) 4.5 g in dextrose 5 % in water (D5W) 5 % 100 mL IVPB (MB+) (0 g Intravenous Stopped 4/21/23 2140)   dextrose 5 % (D5W) infusion (has no administration in time range)   sodium chloride 0.9% flush 10 mL (has no administration in time range)   heparin (porcine) injection 5,000 Units (has no administration  in time range)   lactated ringers bolus 1,974 mL (1,974 mLs Intravenous New Bag 4/21/23 2030)   vancomycin 2 g in dextrose 5 % 500 mL IVPB (2,000 mg Intravenous New Bag 4/21/23 2053)   lactated ringers bolus 1,000 mL (0 mLs Intravenous Stopped 4/21/23 2053)     MDM:    72 y.o. female with altered mental status and hypotension    Differential Dx:  Differential includes but is not limited to sepsis, pneumonia, electrolyte derangement, ICH    ED Management:  AMS workup started for an acute presentation of an emergent condition with multiple comorbidities.  Called back to patient's room for hypotension in the 60s over 30s.  Pressure bagged 1 L of fluids with appropriate response in blood pressure. Broad spectrum antibiotics ordered for sepsis coverage. Patient given full 30cc/kg fluid bolus with blood pressures stable with systolic's of 120s and increase in mental status after normalization of blood pressure.  Patient found to be hypernatremic.  MICU consulted for admission due to symptomatic hypernatremia    Discussed with:  Critical care medicine  Sepsis Attestation: At 11:34 PM,  I attest a sepsis perfusion exam was performed within 6 hours of sepsis, severe sepsis, or septic shock presentation, following fluid resuscitation.      ED Course as of 04/21/23 2339 Fri Apr 21, 2023 2117 WBC(!): 14.46 [NN]   2117 POC Lactate(!): 3.01 [NN]   2117 Hyaline Casts, UA(!): 100 [NN]   2117 WBC, UA(!): 9 [NN]   2117 Bacteria, UA(!): Few [NN]   2117 WBC(!): 14.46 [AW]   2233 EKG with normal sinus rhythm, rate 86, no STEMI, independently interpreted by me. [NN]   2250 Sodium(!!): 162 [AW]      ED Course User Index  [AW] Robert Fournier MD  [NN] Randee Albarran MD     Diagnostic Impression:    Final diagnoses:  [R41.82] AMS (altered mental status)  [S81.802A] Leg wound, left  [E87.0] Hypernatremia (Primary)  [A41.9] Sepsis, due to unspecified organism, unspecified whether acute organ dysfunction present  [I95.9]  Hypotension, unspecified hypotension type     ED Disposition Condition    Admit                    Robert Fournier MD  Resident  04/21/23 4788

## 2023-04-22 NOTE — ED NOTES
Pt arrived from ED bed 22 to ED bed 2. Pt on 15 L no-rebreather and 1 LR infusing via pressure bag. Pt minimally responsive at this time. Dr. Albarran and Rns at bedside.

## 2023-04-23 PROBLEM — G93.40 ACUTE ENCEPHALOPATHY: Status: ACTIVE | Noted: 2023-04-23

## 2023-04-23 LAB
ALBUMIN SERPL BCP-MCNC: 2.1 G/DL (ref 3.5–5.2)
ALP SERPL-CCNC: 57 U/L (ref 55–135)
ALT SERPL W/O P-5'-P-CCNC: 29 U/L (ref 10–44)
ANION GAP SERPL CALC-SCNC: 10 MMOL/L (ref 8–16)
ANION GAP SERPL CALC-SCNC: 11 MMOL/L (ref 8–16)
ANION GAP SERPL CALC-SCNC: 8 MMOL/L (ref 8–16)
ANION GAP SERPL CALC-SCNC: 9 MMOL/L (ref 8–16)
ANION GAP SERPL CALC-SCNC: 9 MMOL/L (ref 8–16)
AST SERPL-CCNC: 32 U/L (ref 10–40)
BASOPHILS # BLD AUTO: 0.08 K/UL (ref 0–0.2)
BASOPHILS NFR BLD: 0.6 % (ref 0–1.9)
BILIRUB SERPL-MCNC: 0.5 MG/DL (ref 0.1–1)
BUN SERPL-MCNC: 18 MG/DL (ref 8–23)
BUN SERPL-MCNC: 19 MG/DL (ref 8–23)
BUN SERPL-MCNC: 22 MG/DL (ref 8–23)
BUN SERPL-MCNC: 22 MG/DL (ref 8–23)
BUN SERPL-MCNC: 25 MG/DL (ref 8–23)
BUN SERPL-MCNC: 25 MG/DL (ref 8–23)
BUN SERPL-MCNC: 28 MG/DL (ref 8–23)
CALCIUM SERPL-MCNC: 8.4 MG/DL (ref 8.7–10.5)
CALCIUM SERPL-MCNC: 8.4 MG/DL (ref 8.7–10.5)
CALCIUM SERPL-MCNC: 8.5 MG/DL (ref 8.7–10.5)
CALCIUM SERPL-MCNC: 8.5 MG/DL (ref 8.7–10.5)
CALCIUM SERPL-MCNC: 8.6 MG/DL (ref 8.7–10.5)
CALCIUM SERPL-MCNC: 8.6 MG/DL (ref 8.7–10.5)
CALCIUM SERPL-MCNC: 8.8 MG/DL (ref 8.7–10.5)
CHLORIDE SERPL-SCNC: 114 MMOL/L (ref 95–110)
CHLORIDE SERPL-SCNC: 115 MMOL/L (ref 95–110)
CHLORIDE SERPL-SCNC: 115 MMOL/L (ref 95–110)
CHLORIDE SERPL-SCNC: 117 MMOL/L (ref 95–110)
CO2 SERPL-SCNC: 24 MMOL/L (ref 23–29)
CO2 SERPL-SCNC: 24 MMOL/L (ref 23–29)
CO2 SERPL-SCNC: 25 MMOL/L (ref 23–29)
CO2 SERPL-SCNC: 26 MMOL/L (ref 23–29)
CREAT SERPL-MCNC: 1 MG/DL (ref 0.5–1.4)
CREAT SERPL-MCNC: 1.1 MG/DL (ref 0.5–1.4)
CREAT SERPL-MCNC: 1.1 MG/DL (ref 0.5–1.4)
DIFFERENTIAL METHOD: ABNORMAL
EOSINOPHIL # BLD AUTO: 0.1 K/UL (ref 0–0.5)
EOSINOPHIL NFR BLD: 0.6 % (ref 0–8)
ERYTHROCYTE [DISTWIDTH] IN BLOOD BY AUTOMATED COUNT: 14.7 % (ref 11.5–14.5)
EST. GFR  (NO RACE VARIABLE): 53.4 ML/MIN/1.73 M^2
EST. GFR  (NO RACE VARIABLE): 53.4 ML/MIN/1.73 M^2
EST. GFR  (NO RACE VARIABLE): 59.9 ML/MIN/1.73 M^2
GLUCOSE SERPL-MCNC: 116 MG/DL (ref 70–110)
GLUCOSE SERPL-MCNC: 117 MG/DL (ref 70–110)
GLUCOSE SERPL-MCNC: 118 MG/DL (ref 70–110)
GLUCOSE SERPL-MCNC: 119 MG/DL (ref 70–110)
GLUCOSE SERPL-MCNC: 119 MG/DL (ref 70–110)
GLUCOSE SERPL-MCNC: 124 MG/DL (ref 70–110)
GLUCOSE SERPL-MCNC: 156 MG/DL (ref 70–110)
HCT VFR BLD AUTO: 36.8 % (ref 37–48.5)
HGB BLD-MCNC: 10.5 G/DL (ref 12–16)
IMM GRANULOCYTES # BLD AUTO: 0.19 K/UL (ref 0–0.04)
IMM GRANULOCYTES NFR BLD AUTO: 1.5 % (ref 0–0.5)
LYMPHOCYTES # BLD AUTO: 2.4 K/UL (ref 1–4.8)
LYMPHOCYTES NFR BLD: 19.2 % (ref 18–48)
MAGNESIUM SERPL-MCNC: 2.7 MG/DL (ref 1.6–2.6)
MCH RBC QN AUTO: 27.9 PG (ref 27–31)
MCHC RBC AUTO-ENTMCNC: 28.5 G/DL (ref 32–36)
MCV RBC AUTO: 98 FL (ref 82–98)
MONOCYTES # BLD AUTO: 1.4 K/UL (ref 0.3–1)
MONOCYTES NFR BLD: 11.1 % (ref 4–15)
NEUTROPHILS # BLD AUTO: 8.3 K/UL (ref 1.8–7.7)
NEUTROPHILS NFR BLD: 67 % (ref 38–73)
NRBC BLD-RTO: 1 /100 WBC
PHOSPHATE SERPL-MCNC: 2.9 MG/DL (ref 2.7–4.5)
PLATELET # BLD AUTO: 338 K/UL (ref 150–450)
PMV BLD AUTO: 11.3 FL (ref 9.2–12.9)
POCT GLUCOSE: 113 MG/DL (ref 70–110)
POTASSIUM SERPL-SCNC: 3.4 MMOL/L (ref 3.5–5.1)
POTASSIUM SERPL-SCNC: 3.4 MMOL/L (ref 3.5–5.1)
POTASSIUM SERPL-SCNC: 3.5 MMOL/L (ref 3.5–5.1)
POTASSIUM SERPL-SCNC: 3.6 MMOL/L (ref 3.5–5.1)
POTASSIUM SERPL-SCNC: 3.6 MMOL/L (ref 3.5–5.1)
PROT SERPL-MCNC: 7 G/DL (ref 6–8.4)
RBC # BLD AUTO: 3.77 M/UL (ref 4–5.4)
SODIUM SERPL-SCNC: 148 MMOL/L (ref 136–145)
SODIUM SERPL-SCNC: 149 MMOL/L (ref 136–145)
SODIUM SERPL-SCNC: 151 MMOL/L (ref 136–145)
SODIUM SERPL-SCNC: 151 MMOL/L (ref 136–145)
SODIUM SERPL-SCNC: 152 MMOL/L (ref 136–145)
SODIUM SERPL-SCNC: 152 MMOL/L (ref 136–145)
SODIUM SERPL-SCNC: 153 MMOL/L (ref 136–145)
VANCOMYCIN TROUGH SERPL-MCNC: 27.6 UG/ML (ref 10–22)
WBC # BLD AUTO: 12.42 K/UL (ref 3.9–12.7)

## 2023-04-23 PROCEDURE — 36415 COLL VENOUS BLD VENIPUNCTURE: CPT | Performed by: INTERNAL MEDICINE

## 2023-04-23 PROCEDURE — 25000003 PHARM REV CODE 250

## 2023-04-23 PROCEDURE — 36415 COLL VENOUS BLD VENIPUNCTURE: CPT

## 2023-04-23 PROCEDURE — 80053 COMPREHEN METABOLIC PANEL: CPT

## 2023-04-23 PROCEDURE — 20600001 HC STEP DOWN PRIVATE ROOM

## 2023-04-23 PROCEDURE — 85025 COMPLETE CBC W/AUTO DIFF WBC: CPT

## 2023-04-23 PROCEDURE — 25000003 PHARM REV CODE 250: Performed by: INTERNAL MEDICINE

## 2023-04-23 PROCEDURE — 99233 PR SUBSEQUENT HOSPITAL CARE,LEVL III: ICD-10-PCS | Mod: ,,, | Performed by: FAMILY MEDICINE

## 2023-04-23 PROCEDURE — 99233 SBSQ HOSP IP/OBS HIGH 50: CPT | Mod: ,,, | Performed by: FAMILY MEDICINE

## 2023-04-23 PROCEDURE — 63600175 PHARM REV CODE 636 W HCPCS

## 2023-04-23 PROCEDURE — 80202 ASSAY OF VANCOMYCIN: CPT | Performed by: INTERNAL MEDICINE

## 2023-04-23 PROCEDURE — 83735 ASSAY OF MAGNESIUM: CPT

## 2023-04-23 PROCEDURE — 80048 BASIC METABOLIC PNL TOTAL CA: CPT | Mod: XB | Performed by: INTERNAL MEDICINE

## 2023-04-23 PROCEDURE — 84100 ASSAY OF PHOSPHORUS: CPT

## 2023-04-23 PROCEDURE — 63600175 PHARM REV CODE 636 W HCPCS: Performed by: INTERNAL MEDICINE

## 2023-04-23 PROCEDURE — 11000001 HC ACUTE MED/SURG PRIVATE ROOM

## 2023-04-23 RX ADMIN — MUPIROCIN: 20 OINTMENT TOPICAL at 11:04

## 2023-04-23 RX ADMIN — HEPARIN SODIUM 5000 UNITS: 5000 INJECTION INTRAVENOUS; SUBCUTANEOUS at 05:04

## 2023-04-23 RX ADMIN — PIPERACILLIN SODIUM AND TAZOBACTAM SODIUM 4.5 G: 4; .5 INJECTION, POWDER, FOR SOLUTION INTRAVENOUS at 11:04

## 2023-04-23 RX ADMIN — PIPERACILLIN SODIUM AND TAZOBACTAM SODIUM 4.5 G: 4; .5 INJECTION, POWDER, FOR SOLUTION INTRAVENOUS at 12:04

## 2023-04-23 RX ADMIN — VANCOMYCIN HYDROCHLORIDE 1250 MG: 1.25 INJECTION, POWDER, LYOPHILIZED, FOR SOLUTION INTRAVENOUS at 05:04

## 2023-04-23 RX ADMIN — HEPARIN SODIUM 5000 UNITS: 5000 INJECTION INTRAVENOUS; SUBCUTANEOUS at 11:04

## 2023-04-23 RX ADMIN — MUPIROCIN: 20 OINTMENT TOPICAL at 08:04

## 2023-04-23 RX ADMIN — PIPERACILLIN SODIUM AND TAZOBACTAM SODIUM 4.5 G: 4; .5 INJECTION, POWDER, FOR SOLUTION INTRAVENOUS at 05:04

## 2023-04-23 NOTE — CONSULTS
"  [facility] - Intensive Care ([facility]-)  Adult Nutrition  Consult Note    SUMMARY     Recommendations    1. When diet is advanced, recommend low sodium diet.     2. When PO intake allowed, recommend adding Radhames BID x 2 weeks, MVI, vit C, and zinc to promote wound healing.     3. If pt is eating <50% of meals, add ONS Boost Plus to meals.    Goals: Meet % EEN by RD f/u.  Nutrition Goal Status: new  Communication of RD Recs: other (comment) (POC)    Assessment and Plan    Nutrition Problem  Inadequate oral intake    Related to (etiology):   NPO status  Poor PO intake prior to admit    Signs and Symptoms (as evidenced by):   Intakes less than 50% of estimated nutrient needs      Interventions/Recommendations (treatment strategy):  Collaboration of nutrition care with other providers    Nutrition Diagnosis Status:   New       Reason for Assessment    Reason For Assessment: consult  Diagnosis: other (see comments) (Hypernatremia)  Relevant Medical History: HTN, dementia, HLD, colon polyps, pyoderma gangrenosum  Interdisciplinary Rounds: did not attend    General Information Comments: RD consulted for lower left leg wound. Media reviewed. Pt resides in [facility] per chart who reports poor PO intake. Decline in mentation per chart. RD unable to speak with caregiver d/t dementia. Weight trends reviewed. Pt is NPO at this time.    Nutrition Discharge Planning: Pending clinical course    Nutrition Risk Screen    Nutrition Risk Screen: reduced oral intake over the last month    Nutrition/Diet History    Food Allergies: NKFA    Anthropometrics    Temp: 98.9 °F (37.2 °C)  Height Method: Estimated  Height: 5' 4" (162.6 cm)  Height (inches): 64 in  Weight Method: Bed Scale  Weight: 88.2 kg (194 lb 7.1 oz)  Weight (lb): 194.45 lb  Ideal Body Weight (IBW), Female: 120 lb  % Ideal Body Weight, Female (lb): 162.04 %  BMI (Calculated): 33.4  BMI Grade: 30 - 34.9- obesity - grade I       Lab/Procedures/Meds    Pertinent " Labs Reviewed: reviewed  Pertinent Labs Comments: Na 151, Cl 117, GFR 53.4, Glu 156, Ca 8.4, RBC 3.77, H/H 10.5/36.8  Pertinent Medications Reviewed: reviewed  Pertinent Medications Comments: heparin, vancomycin, D5W, glucagon, insulin    Estimated/Assessed Needs    Weight Used For Calorie Calculations: 54.5 kg (120 lb 2.4 oz) (IBW)  Energy Calorie Requirements (kcal): 1125-9954 kcal (30-35 kcal/kg IBW)  Energy Need Method: Kcal/kg  Protein Requirements: 55 g (1 g/kg IBW)  Weight Used For Protein Calculations: 54.5 kg (120 lb 2.4 oz) (IBW)  Fluid Requirements (mL): 1 ml/kcal or per MD     RDA Method (mL): 1635       Nutrition Prescription Ordered    Current Diet Order: NPO    Evaluation of Received Nutrient/Fluid Intake    I/O: +2.8 L since admit  Comments: LBM: 4/22  % Intake of Estimated Energy Needs: 0 - 25 %  % Meal Intake: NPO    Nutrition Risk    Level of Risk/Frequency of Follow-up:  (1x/week)       Monitor and Evaluation    Food and Nutrient Intake: energy intake, food and beverage intake  Food and Nutrient Adminstration: diet order  Knowledge/Beliefs/Attitudes: food and nutrition knowledge/skill  Physical Activity and Function: factors affecting access to physical activity  Anthropometric Measurements: height/length, weight, weight change, body mass index  Biochemical Data, Medical Tests and Procedures: electrolyte and renal panel, gastrointestinal profile, glucose/endocrine profile, inflammatory profile, lipid profile  Nutrition-Focused Physical Findings: overall appearance, extremities, muscles and bones, head and eyes, skin       Nutrition Follow-Up    RD Follow-up?: Yes

## 2023-04-23 NOTE — PROGRESS NOTES
Pharmacokinetic Assessment Follow Up: IV Vancomycin    Vancomycin serum concentration assessment(s):    The trough level was drawn correctly and can be used to guide therapy at this time. The measurement is above the desired definitive target range of 10 to 20 mcg/mL.    Vancomycin Regimen Plan:    Discontinue the scheduled vancomycin regimen and re-dose when the random level is less than 20 mcg/mL, next level to be drawn at AM labs on 04/24/23.    Drug levels (last 3 results):  Recent Labs   Lab Result Units 04/22/23  0952 04/23/23  1548   Vancomycin, Random ug/mL 4.3  --    Vancomycin-Trough ug/mL  --  27.6*       Pharmacy will continue to follow and monitor vancomycin.    Please contact pharmacy at extension [phone] for questions regarding this assessment.    Thank you for the consult,   Carl Clark       Patient brief summary:  Miranda Houston is a 72 y.o. female initiated on antimicrobial therapy with IV Vancomycin for treatment of skin & soft tissue infection        Drug Allergies:   Review of patient's allergies indicates:   Allergen Reactions    No known drug allergies        Actual Body Weight:   88.2 kg    Renal Function:   Estimated Creatinine Clearance: 49.7 mL/min (based on SCr of 1.1 mg/dL).,         CBC (last 72 hours):  Recent Labs   Lab Result Units 04/21/23 2037 04/22/23 0355 04/22/23 0952 04/23/23  0359   WBC K/uL 14.46* 14.11* 14.74* 12.42   Hemoglobin g/dL 13.1 10.4* 10.8* 10.5*   Hematocrit % 44.2 36.3* 37.0 36.8*   Platelets K/uL 385 333 329 338   Gran % % 71.4 74.0* 73.3* 67.0   Lymph % % 17.4* 14.8* 14.5* 19.2   Mono % % 10.2 9.9 10.7 11.1   Eosinophil % % 0.0 0.1 0.1 0.6   Basophil % % 0.4 0.5 0.5 0.6   Differential Method  Automated Automated Automated Automated       Metabolic Panel (last 72 hours):  Recent Labs   Lab Result Units 04/21/23 2037 04/21/23 2046 04/21/23  2356 04/22/23  0355 04/22/23  0952 04/22/23  1549 04/22/23 2045 04/22/23  2349 04/23/23  0359 04/23/23  0907  04/23/23  1247 04/23/23  1548   Sodium mmol/L 162*  --  158* 156* 158* 156* 153* 153* 152*  152* 151* 148* 151*   Potassium mmol/L 4.3  --  4.3 3.9 4.1 4.1 3.7 3.4* 3.6  3.6 3.5 3.4* 3.5   Chloride mmol/L 119*  --  119* 119* 119* 119* 118* 117* 117*  117* 117* 114* 115*   CO2 mmol/L 25  --  27 27 28 27 23 26 26  26 24 24 25   Glucose mg/dL 121*  --  132* 131* 102 122* 138* 116* 119*  119* 156* 118* 124*   Glucose, UA   --  Negative  --   --   --   --   --   --   --   --   --   --    BUN mg/dL 61*  --  53* 48* 42* 36* 31* 28* 25*  25* 22 22 19   Creatinine mg/dL 1.8*  --  1.7* 1.3 1.3 1.2 1.0 1.0 1.0  1.0 1.1 1.0 1.1   Creatinine, Urine mg/dL  --  254.0  --   --   --   --   --   --   --   --   --   --    Albumin g/dL 2.5*  --   --  2.0*  --   --   --   --  2.1*  --   --   --    Total Bilirubin mg/dL 0.5  --   --  0.5  --   --   --   --  0.5  --   --   --    Alkaline Phosphatase U/L 65  --   --  56  --   --   --   --  57  --   --   --    AST U/L 38  --   --  33  --   --   --   --  32  --   --   --    ALT U/L 40  --   --  33  --   --   --   --  29  --   --   --    Magnesium mg/dL 3.2*  --   --  2.9*  --   --   --   --  2.7*  --   --   --    Phosphorus mg/dL 4.2  --   --  3.5  --   --   --   --  2.9  --   --   --        Vancomycin Administrations:  vancomycin given in the last 96 hours                     vancomycin 1,250 mg in dextrose 5 % (D5W) 250 mL IVPB (Vial-Mate) (mg) 1,250 mg New Bag 04/23/23 0523    vancomycin 1.75 g in 5 % dextrose 500 mL IVPB (mg) 1,750 mg New Bag 04/22/23 1708    vancomycin 2 g in dextrose 5 % 500 mL IVPB (mg) 2,000 mg New Bag 04/21/23 2053                    Microbiologic Results:  Microbiology Results (last 7 days)       Procedure Component Value Units Date/Time    Blood culture x two cultures. Draw prior to antibiotics. [[id number]]  (Abnormal) Collected: 04/21/23 2038    Order Status: Completed Specimen: Blood from Peripheral, Antecubital, Left Updated: 04/23/23 1326     Blood  Culture, Routine Gram stain aer bottle: Gram positive cocci in clusters resembling Staph      Results called to and read back by:Allyssa Jose RN 04/22/2023      STAPHYLOCOCCUS SIMULANS  Susceptibility pending      Narrative:      Aerobic and anaerobic    Blood culture x two cultures. Draw prior to antibiotics. [[id number]]  (Abnormal) Collected: 04/21/23 2038    Order Status: Completed Specimen: Blood from Peripheral, Antecubital, Left Updated: 04/23/23 1325     Blood Culture, Routine Gram stain aer bottle: Gram positive cocci in clusters resembling Staph      Results called to and read back by:Allyssa Jose RN 04/22/2023  16:31      Gram stain ra bottle: Gram positive cocci in clusters resembling Staph      Results called to and read back by: Corin Hendrickson RN 04/23/2023  12:26      STAPHYLOCOCCUS CAPITIS  Susceptibility pending      Narrative:      Aerobic and anaerobic    Blood culture [[id number]] Collected: 04/22/23 2045    Order Status: Completed Specimen: Blood Updated: 04/23/23 0515     Blood Culture, Routine No Growth to date    Narrative:      If possible, please obtain before administration of  antibiotics    Blood culture [[id number]] Collected: 04/22/23 1757    Order Status: Completed Specimen: Blood from Peripheral, Forearm, Left Updated: 04/23/23 0145     Blood Culture, Routine No Growth to date    Narrative:      If possible, please obtain before administration of  antibiotics    MRSA/SA Rapid ID by PCR from Blood culture [[id number]] Collected: 04/21/23 2038    Order Status: Completed Updated: 04/22/23 1746     Staph aureus ID by PCR Negative     Methicillin Resistant ID by PCR Negative    Narrative:      Aerobic and anaerobic

## 2023-04-23 NOTE — PLAN OF CARE
Recommendations     1. When diet is advanced, recommend low sodium diet.      2. When PO intake allowed, recommend adding Radhames BID x 2 weeks, MVI, vit C, and zinc to promote wound healing.      3. If pt is eating <50% of meals, add ONS Boost Plus to meals.     Goals: Meet % EEN by RD f/u.  Nutrition Goal Status: new  Communication of RD Recs: other (comment) (POC)

## 2023-04-24 PROBLEM — I63.9 CVA (CEREBRAL VASCULAR ACCIDENT): Status: ACTIVE | Noted: 2023-04-23

## 2023-04-24 PROBLEM — N17.9 AKI (ACUTE KIDNEY INJURY): Status: ACTIVE | Noted: 2023-04-24

## 2023-04-24 PROBLEM — R78.81 BACTEREMIA: Status: ACTIVE | Noted: 2023-04-24

## 2023-04-24 PROBLEM — G93.41 ACUTE METABOLIC ENCEPHALOPATHY: Status: ACTIVE | Noted: 2023-04-23

## 2023-04-24 LAB
ALBUMIN SERPL BCP-MCNC: 2 G/DL (ref 3.5–5.2)
ALP SERPL-CCNC: 59 U/L (ref 55–135)
ALT SERPL W/O P-5'-P-CCNC: 26 U/L (ref 10–44)
AMMONIA PLAS-SCNC: 52 UMOL/L (ref 10–50)
ANION GAP SERPL CALC-SCNC: 9 MMOL/L (ref 8–16)
ASCENDING AORTA: 3.29 CM
AST SERPL-CCNC: 24 U/L (ref 10–40)
AV INDEX (PROSTH): 0.96
AV MEAN GRADIENT: 5 MMHG
AV PEAK GRADIENT: 8 MMHG
AV VALVE AREA: 2.95 CM2
AV VELOCITY RATIO: 0.7
BACTERIA BLD CULT: ABNORMAL
BASOPHILS # BLD AUTO: 0.09 K/UL (ref 0–0.2)
BASOPHILS NFR BLD: 0.8 % (ref 0–1.9)
BILIRUB SERPL-MCNC: 0.5 MG/DL (ref 0.1–1)
BSA FOR ECHO PROCEDURE: 1.99 M2
BUN SERPL-MCNC: 17 MG/DL (ref 8–23)
CALCIUM SERPL-MCNC: 8.5 MG/DL (ref 8.7–10.5)
CHLORIDE SERPL-SCNC: 114 MMOL/L (ref 95–110)
CO2 SERPL-SCNC: 23 MMOL/L (ref 23–29)
CREAT SERPL-MCNC: 1 MG/DL (ref 0.5–1.4)
CV ECHO LV RWT: 0.46 CM
DIFFERENTIAL METHOD: ABNORMAL
DOP CALC AO PEAK VEL: 1.42 M/S
DOP CALC AO VTI: 24.14 CM
DOP CALC LVOT AREA: 3.1 CM2
DOP CALC LVOT DIAMETER: 1.98 CM
DOP CALC LVOT PEAK VEL: 1 M/S
DOP CALC LVOT STROKE VOLUME: 71.12 CM3
DOP CALCLVOT PEAK VEL VTI: 23.11 CM
E WAVE DECELERATION TIME: 335.67 MSEC
E/A RATIO: 0.72
E/E' RATIO: 10.57 M/S
ECHO LV POSTERIOR WALL: 0.85 CM (ref 0.6–1.1)
EJECTION FRACTION: 60 %
EOSINOPHIL # BLD AUTO: 0.1 K/UL (ref 0–0.5)
EOSINOPHIL NFR BLD: 1.1 % (ref 0–8)
ERYTHROCYTE [DISTWIDTH] IN BLOOD BY AUTOMATED COUNT: 14.5 % (ref 11.5–14.5)
EST. GFR  (NO RACE VARIABLE): 59.9 ML/MIN/1.73 M^2
FOLATE SERPL-MCNC: 7.6 NG/ML (ref 4–24)
FRACTIONAL SHORTENING: 33 % (ref 28–44)
GLUCOSE SERPL-MCNC: 114 MG/DL (ref 70–110)
HCT VFR BLD AUTO: 37.1 % (ref 37–48.5)
HGB BLD-MCNC: 11.2 G/DL (ref 12–16)
IMM GRANULOCYTES # BLD AUTO: 0.22 K/UL (ref 0–0.04)
IMM GRANULOCYTES NFR BLD AUTO: 2 % (ref 0–0.5)
INTERVENTRICULAR SEPTUM: 0.87 CM (ref 0.6–1.1)
LA MAJOR: 4.69 CM
LA MINOR: 4.58 CM
LA WIDTH: 3.33 CM
LEFT ATRIUM SIZE: 3.64 CM
LEFT ATRIUM VOLUME INDEX: 24.7 ML/M2
LEFT ATRIUM VOLUME: 47.75 CM3
LEFT INTERNAL DIMENSION IN SYSTOLE: 2.49 CM (ref 2.1–4)
LEFT VENTRICLE DIASTOLIC VOLUME INDEX: 30.17 ML/M2
LEFT VENTRICLE DIASTOLIC VOLUME: 58.23 ML
LEFT VENTRICLE MASS INDEX: 47 G/M2
LEFT VENTRICLE SYSTOLIC VOLUME INDEX: 11.4 ML/M2
LEFT VENTRICLE SYSTOLIC VOLUME: 22.05 ML
LEFT VENTRICULAR INTERNAL DIMENSION IN DIASTOLE: 3.7 CM (ref 3.5–6)
LEFT VENTRICULAR MASS: 90.93 G
LV LATERAL E/E' RATIO: 8.22 M/S
LV SEPTAL E/E' RATIO: 14.8 M/S
LYMPHOCYTES # BLD AUTO: 2.4 K/UL (ref 1–4.8)
LYMPHOCYTES NFR BLD: 21.6 % (ref 18–48)
MAGNESIUM SERPL-MCNC: 2.5 MG/DL (ref 1.6–2.6)
MCH RBC QN AUTO: 28.8 PG (ref 27–31)
MCHC RBC AUTO-ENTMCNC: 30.2 G/DL (ref 32–36)
MCV RBC AUTO: 95 FL (ref 82–98)
MONOCYTES # BLD AUTO: 1.1 K/UL (ref 0.3–1)
MONOCYTES NFR BLD: 10.2 % (ref 4–15)
MV PEAK A VEL: 1.03 M/S
MV PEAK E VEL: 0.74 M/S
MV STENOSIS PRESSURE HALF TIME: 97.34 MS
MV VALVE AREA P 1/2 METHOD: 2.26 CM2
NEUTROPHILS # BLD AUTO: 7.1 K/UL (ref 1.8–7.7)
NEUTROPHILS NFR BLD: 64.3 % (ref 38–73)
NRBC BLD-RTO: 1 /100 WBC
PHOSPHATE SERPL-MCNC: 3.1 MG/DL (ref 2.7–4.5)
PLATELET # BLD AUTO: 155 K/UL (ref 150–450)
PMV BLD AUTO: 11.5 FL (ref 9.2–12.9)
POCT GLUCOSE: 133 MG/DL (ref 70–110)
POCT GLUCOSE: 148 MG/DL (ref 70–110)
POCT GLUCOSE: 157 MG/DL (ref 70–110)
POCT GLUCOSE: 161 MG/DL (ref 70–110)
POTASSIUM SERPL-SCNC: 3.4 MMOL/L (ref 3.5–5.1)
PROT SERPL-MCNC: 6.9 G/DL (ref 6–8.4)
RA MAJOR: 5.1 CM
RA WIDTH: 3.35 CM
RBC # BLD AUTO: 3.89 M/UL (ref 4–5.4)
RIGHT VENTRICULAR END-DIASTOLIC DIMENSION: 2.77 CM
RV TISSUE DOPPLER FREE WALL SYSTOLIC VELOCITY 1 (APICAL 4 CHAMBER VIEW): 11 CM/S
SINUS: 2.42 CM
SODIUM SERPL-SCNC: 146 MMOL/L (ref 136–145)
STJ: 2.84 CM
T4 FREE SERPL-MCNC: 1.14 NG/DL (ref 0.71–1.51)
TDI LATERAL: 0.09 M/S
TDI SEPTAL: 0.05 M/S
TDI: 0.07 M/S
TRICUSPID ANNULAR PLANE SYSTOLIC EXCURSION: 1.65 CM
TSH SERPL DL<=0.005 MIU/L-ACNC: 4.07 UIU/ML (ref 0.4–4)
VANCOMYCIN SERPL-MCNC: 16.1 UG/ML
VIT B12 SERPL-MCNC: 975 PG/ML (ref 210–950)
WBC # BLD AUTO: 11.09 K/UL (ref 3.9–12.7)

## 2023-04-24 PROCEDURE — 85025 COMPLETE CBC W/AUTO DIFF WBC: CPT

## 2023-04-24 PROCEDURE — 99233 PR SUBSEQUENT HOSPITAL CARE,LEVL III: ICD-10-PCS | Mod: ,,, | Performed by: FAMILY MEDICINE

## 2023-04-24 PROCEDURE — 83735 ASSAY OF MAGNESIUM: CPT

## 2023-04-24 PROCEDURE — 80053 COMPREHEN METABOLIC PANEL: CPT

## 2023-04-24 PROCEDURE — 82140 ASSAY OF AMMONIA: CPT | Performed by: FAMILY MEDICINE

## 2023-04-24 PROCEDURE — 63600175 PHARM REV CODE 636 W HCPCS

## 2023-04-24 PROCEDURE — 25000003 PHARM REV CODE 250: Performed by: FAMILY MEDICINE

## 2023-04-24 PROCEDURE — 11000001 HC ACUTE MED/SURG PRIVATE ROOM

## 2023-04-24 PROCEDURE — 80202 ASSAY OF VANCOMYCIN: CPT | Performed by: FAMILY MEDICINE

## 2023-04-24 PROCEDURE — 84100 ASSAY OF PHOSPHORUS: CPT

## 2023-04-24 PROCEDURE — 25000003 PHARM REV CODE 250: Performed by: INTERNAL MEDICINE

## 2023-04-24 PROCEDURE — 63600175 PHARM REV CODE 636 W HCPCS: Performed by: FAMILY MEDICINE

## 2023-04-24 PROCEDURE — 25000003 PHARM REV CODE 250

## 2023-04-24 PROCEDURE — 84439 ASSAY OF FREE THYROXINE: CPT | Performed by: FAMILY MEDICINE

## 2023-04-24 PROCEDURE — 82746 ASSAY OF FOLIC ACID SERUM: CPT | Performed by: FAMILY MEDICINE

## 2023-04-24 PROCEDURE — 82607 VITAMIN B-12: CPT | Performed by: FAMILY MEDICINE

## 2023-04-24 PROCEDURE — 99233 SBSQ HOSP IP/OBS HIGH 50: CPT | Mod: ,,, | Performed by: FAMILY MEDICINE

## 2023-04-24 PROCEDURE — 84443 ASSAY THYROID STIM HORMONE: CPT | Performed by: FAMILY MEDICINE

## 2023-04-24 RX ORDER — POTASSIUM CHLORIDE 7.45 MG/ML
10 INJECTION INTRAVENOUS
Status: COMPLETED | OUTPATIENT
Start: 2023-04-24 | End: 2023-04-24

## 2023-04-24 RX ORDER — ENOXAPARIN SODIUM 100 MG/ML
40 INJECTION SUBCUTANEOUS EVERY 24 HOURS
Status: DISCONTINUED | OUTPATIENT
Start: 2023-04-24 | End: 2023-04-25

## 2023-04-24 RX ADMIN — MUPIROCIN: 20 OINTMENT TOPICAL at 08:04

## 2023-04-24 RX ADMIN — METHYLPREDNISOLONE SODIUM SUCCINATE 40 MG: 40 INJECTION, POWDER, FOR SOLUTION INTRAMUSCULAR; INTRAVENOUS at 09:04

## 2023-04-24 RX ADMIN — VANCOMYCIN HYDROCHLORIDE 1500 MG: 1.5 INJECTION, POWDER, LYOPHILIZED, FOR SOLUTION INTRAVENOUS at 09:04

## 2023-04-24 RX ADMIN — POTASSIUM CHLORIDE 10 MEQ: 7.46 INJECTION, SOLUTION INTRAVENOUS at 08:04

## 2023-04-24 RX ADMIN — POTASSIUM CHLORIDE 10 MEQ: 7.46 INJECTION, SOLUTION INTRAVENOUS at 07:04

## 2023-04-24 RX ADMIN — DEXTROSE MONOHYDRATE: 50 INJECTION, SOLUTION INTRAVENOUS at 08:04

## 2023-04-24 RX ADMIN — PIPERACILLIN SODIUM AND TAZOBACTAM SODIUM 4.5 G: 4; .5 INJECTION, POWDER, FOR SOLUTION INTRAVENOUS at 05:04

## 2023-04-24 RX ADMIN — ENOXAPARIN SODIUM 40 MG: 40 INJECTION SUBCUTANEOUS at 05:04

## 2023-04-24 RX ADMIN — HEPARIN SODIUM 5000 UNITS: 5000 INJECTION INTRAVENOUS; SUBCUTANEOUS at 05:04

## 2023-04-24 NOTE — PLAN OF CARE
New stroke identified on MRI.  Discussed with patient's son, Angel, who is going to speak to the rest of the family.  They are considering palliative/hospice.  We will hold off on stroke order set and specifically on antiplatelets that would need to be administered rectally at the request of Angel.

## 2023-04-24 NOTE — ASSESSMENT & PLAN NOTE
Chronic Dementia  Dehydration  Hypernatremia/hyperchloremia  Possible infectious process  -continue hypotonic IV fluids, dehydration and electrolyte abnormalities improving.  -continue IV antibiotics for possible left lower extremity cellulitis.  Consider discussion with ID and Podiatry.   -family says cognitive decline has been rapid over the last few weeks.  Concern for other neurologic process.  MRI brain, carotid ultrasound, echo pending.

## 2023-04-24 NOTE — HPI
"Per admitting physician:   Ms. Houston is a 72 year old female with PMH notable for advanced dementia (resides at [facility]), HTN, HLD, pyoderma gangrenosum, and chronic rhinitis who presented to [facility] ED via EMS for AMS and "failure to thrive." Per chart review, facility reports poor PO intake and decreased mentation. EMS reports patient was only responsive to painful stimulus.      Initial lab evaluation revealed leukocytosis with WBC 14, stable H/H, sed rate > 120, , Na 162, Cl 119, JOSEFA with BUN/Cr 61/1.8. CT head with no acute intracranial findings. XRAY foot, tib/fib, and ankle without fracture. CXR with small L effusion. In the ED she was given a total of 2.9L IVF and started on broad spectrum abx with infectious work up pending. Toxicology negative.      Critical care consulted for AMS and hypernatremia.       "

## 2023-04-24 NOTE — PROGRESS NOTES
Therapy with vancomycin complete and/or consult discontinued by provider.  Pharmacy will sign off, please re-consult as needed.    Thank you,  Audra RileyD

## 2023-04-24 NOTE — PROGRESS NOTES
Pharmacokinetic Assessment Follow Up: IV Vancomycin    Vancomycin serum concentration assessment(s):    The random level was drawn correctly and can be used to guide therapy at this time. The measurement is within the desired definitive target range of 10 to 20 mcg/mL.    Vancomycin Regimen Plan:    Change regimen to Vancomycin 1500 mg IV every 24 hours with next serum trough concentration measured at 0730 prior to 3rd dose on 4/26.    Drug levels (last 3 results):  Recent Labs   Lab Result Units 04/22/23  0952 04/23/23  1548 04/24/23  0406   Vancomycin, Random ug/mL 4.3  --  16.1   Vancomycin-Trough ug/mL  --  27.6*  --        Pharmacy will continue to follow and monitor vancomycin.    Please contact pharmacy at extension [phone] for questions regarding this assessment.    Thank you for the consult,   Radha Horne       Patient brief summary:  Miranda Houston is a 72 y.o. female initiated on antimicrobial therapy with IV Vancomycin for treatment of skin & soft tissue infection.    Drug Allergies:   Review of patient's allergies indicates:   Allergen Reactions    No known drug allergies        Actual Body Weight:   88.2 kg    Renal Function:   Estimated Creatinine Clearance: 54.7 mL/min (based on SCr of 1 mg/dL).,     Dialysis Method (if applicable):  N/A    CBC (last 72 hours):  Recent Labs   Lab Result Units 04/21/23 2037 04/22/23  0355 04/22/23  0952 04/23/23  0359 04/24/23  0406   WBC K/uL 14.46* 14.11* 14.74* 12.42 11.09   Hemoglobin g/dL 13.1 10.4* 10.8* 10.5* 11.2*   Hematocrit % 44.2 36.3* 37.0 36.8* 37.1   Platelets K/uL 385 333 329 338 155   Gran % % 71.4 74.0* 73.3* 67.0 64.3   Lymph % % 17.4* 14.8* 14.5* 19.2 21.6   Mono % % 10.2 9.9 10.7 11.1 10.2   Eosinophil % % 0.0 0.1 0.1 0.6 1.1   Basophil % % 0.4 0.5 0.5 0.6 0.8   Differential Method  Automated Automated Automated Automated Automated       Metabolic Panel (last 72 hours):  Recent Labs   Lab Result Units 04/21/23 2037 04/21/23 2046  04/21/23  2356 04/22/23  0355 04/22/23  0952 04/22/23  1549 04/22/23  2045 04/22/23  2349 04/23/23  0359 04/23/23  0907 04/23/23  1247 04/23/23  1548 04/23/23 2020 04/24/23  0406   Sodium mmol/L 162*  --  158* 156* 158* 156* 153* 153* 152*  152* 151* 148* 151* 149* 146*   Potassium mmol/L 4.3  --  4.3 3.9 4.1 4.1 3.7 3.4* 3.6  3.6 3.5 3.4* 3.5 3.5 3.4*   Chloride mmol/L 119*  --  119* 119* 119* 119* 118* 117* 117*  117* 117* 114* 115* 115* 114*   CO2 mmol/L 25  --  27 27 28 27 23 26 26  26 24 24 25 26 23   Glucose mg/dL 121*  --  132* 131* 102 122* 138* 116* 119*  119* 156* 118* 124* 117* 114*   Glucose, UA   --  Negative  --   --   --   --   --   --   --   --   --   --   --   --    BUN mg/dL 61*  --  53* 48* 42* 36* 31* 28* 25*  25* 22 22 19 18 17   Creatinine mg/dL 1.8*  --  1.7* 1.3 1.3 1.2 1.0 1.0 1.0  1.0 1.1 1.0 1.1 1.0 1.0   Creatinine, Urine mg/dL  --  254.0  --   --   --   --   --   --   --   --   --   --   --   --    Albumin g/dL 2.5*  --   --  2.0*  --   --   --   --  2.1*  --   --   --   --  2.0*   Total Bilirubin mg/dL 0.5  --   --  0.5  --   --   --   --  0.5  --   --   --   --  0.5   Alkaline Phosphatase U/L 65  --   --  56  --   --   --   --  57  --   --   --   --  59   AST U/L 38  --   --  33  --   --   --   --  32  --   --   --   --  24   ALT U/L 40  --   --  33  --   --   --   --  29  --   --   --   --  26   Magnesium mg/dL 3.2*  --   --  2.9*  --   --   --   --  2.7*  --   --   --   --  2.5   Phosphorus mg/dL 4.2  --   --  3.5  --   --   --   --  2.9  --   --   --   --  3.1       Vancomycin Administrations:  vancomycin given in the last 96 hours                     vancomycin 1,250 mg in dextrose 5 % (D5W) 250 mL IVPB (Vial-Mate) (mg) 1,250 mg New Bag 04/23/23 0523    vancomycin 1.75 g in 5 % dextrose 500 mL IVPB (mg) 1,750 mg New Bag 04/22/23 1708    vancomycin 2 g in dextrose 5 % 500 mL IVPB (mg) 2,000 mg New Bag 04/21/23 0980                    Microbiologic Results:  Microbiology  Results (last 7 days)       Procedure Component Value Units Date/Time    Blood culture x two cultures. Draw prior to antibiotics. [[id number]]  (Abnormal) Collected: 04/21/23 2038    Order Status: Completed Specimen: Blood from Peripheral, Antecubital, Left Updated: 04/24/23 0714     Blood Culture, Routine Gram stain aer bottle: Gram positive cocci in clusters resembling Staph      Results called to and read back by:Allyssa Jose RN 04/22/2023  16:31      Gram stain ra bottle: Gram positive cocci in clusters resembling Staph      Results called to and read back by: Corin Hendrickson RN 04/23/2023  12:26      STAPHYLOCOCCUS CAPITIS  Susceptibility pending      Narrative:      Aerobic and anaerobic    Blood culture [[id number]] Collected: 04/22/23 2045    Order Status: Completed Specimen: Blood Updated: 04/23/23 2212     Blood Culture, Routine No Growth to date      No Growth to date    Narrative:      If possible, please obtain before administration of  antibiotics    Blood culture [[id number]] Collected: 04/22/23 1757    Order Status: Completed Specimen: Blood from Peripheral, Forearm, Left Updated: 04/23/23 2012     Blood Culture, Routine No Growth to date      No Growth to date    Narrative:      If possible, please obtain before administration of  antibiotics    Blood culture x two cultures. Draw prior to antibiotics. [[id number]]  (Abnormal) Collected: 04/21/23 2038    Order Status: Completed Specimen: Blood from Peripheral, Antecubital, Left Updated: 04/23/23 1326     Blood Culture, Routine Gram stain aer bottle: Gram positive cocci in clusters resembling Staph      Results called to and read back by:Allyssa Jose RN 04/22/2023      STAPHYLOCOCCUS SIMULANS  Susceptibility pending      Narrative:      Aerobic and anaerobic    MRSA/SA Rapid ID by PCR from Blood culture [[id number]] Collected: 04/21/23 2038    Order Status: Completed Updated: 04/22/23 1746     Staph aureus ID by PCR Negative     Methicillin  Resistant ID by PCR Negative    Narrative:      Aerobic and anaerobic

## 2023-04-24 NOTE — ASSESSMENT & PLAN NOTE
Possible surrounding cellulitis  -continue vanc and Zosyn  -wound care consulted  -consider discussion with Dermatology  -consider Infectious Disease consult for possible surrounding infection.

## 2023-04-24 NOTE — CONSULTS
[facility] - Intensive Care ([place] [address])  Wound Care    Patient Name:  Miranda Houston   MRN:  [id number]  Date: 2023  Diagnosis: Acute metabolic encephalopathy    History:     Past Medical History:   Diagnosis Date    Chronic rhinitis     Colon polyps     Hyperlipidemia     Hypertension     Pyoderma gangrenosum     left leg    Steroid-induced diabetes mellitus        Social History     Socioeconomic History    Marital status: Single   Tobacco Use    Smoking status: Former     Packs/day: 0.25     Years: 7.00     Pack years: 1.75     Types: Cigarettes     Quit date: 1997     Years since quittin.7    Smokeless tobacco: Never   Substance and Sexual Activity    Alcohol use: Yes     Alcohol/week: 7.0 standard drinks     Types: 7 Glasses of wine per week    Drug use: No    Sexual activity: Not Currently       Precautions:     Allergies as of 2023 - Reviewed 2023   Allergen Reaction Noted    No known drug allergies  2012       [facility] Assessment Details/Treatment     Wound care consult performed for assessment of left lower leg wound.  Patient's family reports wound is chronic. Patient resides at [facility] and goes to  [facility] wound care clinic for outpatient treatment.  Recently on 23, patient has wound debridement with current wound care orders in place.  Per [facility] Wound Care Records, there has been improvement in wound with these current therapies.  Discussed findings with treatment team. Recommend continuing current outpatient wound care orders:  Clean with vashe.  Apply garfield collegen and cover with Ag (silver) foam and secure with non compressing ace wrap. Perform 3 xs weekly. Treatment team in agreement.   Wound etiology is not fully determined as patient has hx of Pyoderma Gangrenosum in health records but no  pathology report to confirm. Wound care clinic acknowledges PG history in records but wound is label as a venous ulcer.    Staff nurse to perform wound care.  Wound  care to follow up as needed.  Please re consult wound care for any new concerns.     04/24/23 1630   WOCN Assessment   WOCN Total Time (mins) 60   Visit Date 04/24/23   Visit Time 1600   Consult Type New   WOCN Speciality Wound   Intervention assessed;team conference;orders;applied;chart review        Altered Skin Integrity 04/21/23 2054 Left lower Leg Non pressure chronic ulcer   Date First Assessed/Time First Assessed: 04/21/23 2054   Altered Skin Integrity Present on Admission - Did Patient arrive to the hospital with altered skin?: yes  Side: Left  Orientation: lower  Location: Leg  Is this injury device related?: No  Prima...   Wound Image     Drainage Amount Moderate   Drainage Characteristics/Odor Creamy;Yellow;Malodorous   Appearance Red;Wet;Granulating   Red (%), Wound Tissue Color 100 %   Periwound Area Macerated;Denuded   Wound Edges Rolled/closed   Care Cleansed with:;Antimicrobial agent   Dressing Collagen;Applied;Silver;Hydrofiber;Island/border   Dressing Change Due 04/26/23 04/24/2023

## 2023-04-24 NOTE — SUBJECTIVE & OBJECTIVE
Interval History:  Patient seen and examined with no family at bedside.  Patient says hello appropriately.  Patient then answers okay and I do not know for all other questions.  Patient not following commands.  Patient does moan in pain when attempting to examine bilateral lower extremities.    Labs reviewed:  WBC improved and WNL, hemoglobin low but stable.  Sodium continues to improve.  Creatinine improved to within normal limits from admission.    Discussed with critical Care Medicine, MRI left lower extremity ordered.  Patient does have history of pyoderma gangrenosum but concern for possible associated cellulitis.    Continue on broad-spectrum IV antibiotics for now.  Consider discussion with Podiatry, Dermatology, and Infectious Disease.    Discussed with orthopedics and no recommendations for any surgical intervention.  No osteomyelitis on MRI and no infectious involvement of tendons.    Contacted both of the patient's sons, Chidi and Angel.  Family says that the patient has had a fairly sharp decline in mentation over the last few weeks.  Possibly progressing dementia versus infectious process.  CT head with mention of possible basal ganglia infarcts.  Family says patient has no history of strokes.  MRI, carotid U/S, echo ordered.      Objective:     Vital Signs (Most Recent):  Temp: 98.9 °F (37.2 °C) (04/23/23 1200)  Pulse: 78 (04/23/23 1638)  Resp: 16 (04/23/23 1200)  BP: 132/63 (04/23/23 1200)  SpO2: 100 % (04/23/23 1900) Vital Signs (24h Range):  Temp:  [97.1 °F (36.2 °C)-99.3 °F (37.4 °C)] 98.9 °F (37.2 °C)  Pulse:  [] 78  Resp:  [5-35] 16  SpO2:  [99 %-100 %] 100 %  BP: (107-132)/(56-63) 132/63     Weight: 88.2 kg (194 lb 7.1 oz)  Body mass index is 33.38 kg/m².    Intake/Output Summary (Last 24 hours) at 4/23/2023 2130  Last data filed at 4/23/2023 1751  Gross per 24 hour   Intake --   Output 1050 ml   Net -1050 ml      Physical Exam  Vitals reviewed.   Constitutional:       General: She is  not in acute distress.     Appearance: She is ill-appearing.   HENT:      Mouth/Throat:      Mouth: Mucous membranes are dry.   Cardiovascular:      Rate and Rhythm: Normal rate and regular rhythm.   Pulmonary:      Effort: Pulmonary effort is normal. No respiratory distress.   Abdominal:      General: Bowel sounds are normal. There is no distension.      Palpations: Abdomen is soft.      Tenderness: There is no abdominal tenderness.   Musculoskeletal:      Right lower leg: No edema.      Left lower leg: No edema.   Skin:     Comments: LLE ulcer anterior shin with bandage intact and patient will not tolerate me taking it off.  surrounding edema, erythema, and tenderness to palpation.  Left foot wrapped with dressing clean, dry, intact.  Patient in extreme discomfort when attempting to unwrap.

## 2023-04-24 NOTE — ASSESSMENT & PLAN NOTE
Acute encephalopathy on Chronic Dementia  Dehydration  Hypernatremia/hyperchloremia  -continue hypotonic IV fluids, dehydration and electrolyte abnormalities improving.  -on better examination of lower extremity ulcer, does not appear to be infected.  -family says cognitive decline has been rapid over the last few weeks.  Concern for other neurologic process.  MRI brain concerning for recent/subacute left parietal stroke.  -discussed with the patient's son, Angel, who is planning on transitioning to comfort focused care when he arrives on 04/25.  Angel declines any therapy including PT/OT/ST and declines medications as they would have to be administered rectally.

## 2023-04-24 NOTE — SUBJECTIVE & OBJECTIVE
Interval History:  Patient seen and examined with no family at bedside.  Patient is sleeping comfortably at the time of my exam.  She opens her eyes to voice but is not responding today.  Patient wakes and is in obvious discomfort while examining left lower extremity    MRI 4/23 concerning for acute/recent left parietal lobe infarction    Labs reviewed:  WBC improved and WNL.  Sodium improved to 146.  Chloride improved to 114.  Potassium slightly low.    Discussed with palliative Medicine in updated on clinical status.  They will see the patient in the morning and discuss goals of care with patient's son.    I updated patient's son, Angel, on MRI results concerning for recent/acute stroke.  Patient's son is coming in from [place] in 4/25.  Considering hospice      Objective:     Vital Signs (Most Recent):  Temp: 98 °F (36.7 °C) (04/24/23 1603)  Pulse: 79 (04/24/23 1603)  Resp: 17 (04/24/23 1603)  BP: 139/72 (04/24/23 1603)  SpO2: 99 % (04/24/23 1603) Vital Signs (24h Range):  Temp:  [98 °F (36.7 °C)-98.9 °F (37.2 °C)] 98 °F (36.7 °C)  Pulse:  [66-89] 79  Resp:  [9-30] 17  SpO2:  [95 %-100 %] 99 %  BP: ()/(52-94) 139/72     Weight: 88 kg (194 lb)  Body mass index is 33.3 kg/m².  No intake or output data in the 24 hours ending 04/24/23 1805     Physical Exam  Vitals reviewed.   Constitutional:       General: She is not in acute distress.     Appearance: She is ill-appearing.   HENT:      Mouth/Throat:      Mouth: Mucous membranes are dry.   Cardiovascular:      Rate and Rhythm: Normal rate and regular rhythm.   Pulmonary:      Effort: Pulmonary effort is normal. No respiratory distress.   Abdominal:      General: Bowel sounds are normal. There is no distension.      Palpations: Abdomen is soft.      Tenderness: There is no abdominal tenderness.   Musculoskeletal:      Right lower leg: No edema.      Left lower leg: No edema.   Skin:     Comments: LLE anterior shin ulcer with edema and chronic venous stasis  changes.  On better examination, No significant streaking erythema or warmth.

## 2023-04-24 NOTE — PROGRESS NOTES
"[facility] - Intensive Care ([address] Medicine  Progress Note    Patient Name: Miranda Houston  MRN: [id number]  Patient Class: IP- Inpatient   Admission Date: 4/21/2023  Length of Stay: 2 days  Attending Physician: Andres Dukes III,*  Primary Care Provider: Sabino Calle MD        Subjective:     Principal Problem:Acute encephalopathy        HPI:  Per admitting physician:   Ms. Houston is a 72 year old female with PMH notable for advanced dementia (resides at [facility]), HTN, HLD, pyoderma gangrenosum, and chronic rhinitis who presented to [facility] ED via EMS for AMS and "failure to thrive." Per chart review, facility reports poor PO intake and decreased mentation. EMS reports patient was only responsive to painful stimulus.      Initial lab evaluation revealed leukocytosis with WBC 14, stable H/H, sed rate > 120, , Na 162, Cl 119, JOSEFA with BUN/Cr 61/1.8. CT head with no acute intracranial findings. XRAY foot, tib/fib, and ankle without fracture. CXR with small L effusion. In the ED she was given a total of 2.9L IVF and started on broad spectrum abx with infectious work up pending. Toxicology negative.      Critical care consulted for AMS and hypernatremia.           Overview/Hospital Course:  No notes on file    Interval History:  Patient seen and examined with no family at bedside.  Patient says hello appropriately.  Patient then answers okay and I do not know for all other questions.  Patient not following commands.  Patient does moan in pain when attempting to examine bilateral lower extremities.    Labs reviewed:  WBC improved and WNL, hemoglobin low but stable.  Sodium continues to improve.  Creatinine improved to within normal limits from admission.    Discussed with critical Care Medicine, MRI left lower extremity ordered.  Patient does have history of pyoderma gangrenosum but concern for possible associated cellulitis.    Continue on broad-spectrum IV antibiotics for " now.  Consider discussion with Podiatry, Dermatology, and Infectious Disease.    Discussed with orthopedics and no recommendations for any surgical intervention.  No osteomyelitis on MRI and no infectious involvement of tendons.    Contacted both of the patient's sons, Chidi and Angel.  Family says that the patient has had a fairly sharp decline in mentation over the last few weeks.  Possibly progressing dementia versus infectious process.  CT head with mention of possible basal ganglia infarcts.  Family says patient has no history of strokes.  MRI, carotid U/S, echo ordered.      Objective:     Vital Signs (Most Recent):  Temp: 98.9 °F (37.2 °C) (04/23/23 1200)  Pulse: 78 (04/23/23 1638)  Resp: 16 (04/23/23 1200)  BP: 132/63 (04/23/23 1200)  SpO2: 100 % (04/23/23 1900) Vital Signs (24h Range):  Temp:  [97.1 °F (36.2 °C)-99.3 °F (37.4 °C)] 98.9 °F (37.2 °C)  Pulse:  [] 78  Resp:  [5-35] 16  SpO2:  [99 %-100 %] 100 %  BP: (107-132)/(56-63) 132/63     Weight: 88.2 kg (194 lb 7.1 oz)  Body mass index is 33.38 kg/m².    Intake/Output Summary (Last 24 hours) at 4/23/2023 2130  Last data filed at 4/23/2023 1751  Gross per 24 hour   Intake --   Output 1050 ml   Net -1050 ml      Physical Exam  Vitals reviewed.   Constitutional:       General: She is not in acute distress.     Appearance: She is ill-appearing.   HENT:      Mouth/Throat:      Mouth: Mucous membranes are dry.   Cardiovascular:      Rate and Rhythm: Normal rate and regular rhythm.   Pulmonary:      Effort: Pulmonary effort is normal. No respiratory distress.   Abdominal:      General: Bowel sounds are normal. There is no distension.      Palpations: Abdomen is soft.      Tenderness: There is no abdominal tenderness.   Musculoskeletal:      Right lower leg: No edema.      Left lower leg: No edema.   Skin:     Comments: LLE ulcer anterior shin with bandage intact and patient will not tolerate me taking it off.  surrounding edema, erythema, and tenderness  to palpation.  Left foot wrapped with dressing clean, dry, intact.  Patient in extreme discomfort when attempting to unwrap.         Assessment/Plan:      * Acute encephalopathy  Chronic Dementia  Dehydration  Hypernatremia/hyperchloremia  Possible infectious process  -continue hypotonic IV fluids, dehydration and electrolyte abnormalities improving.  -continue IV antibiotics for possible left lower extremity cellulitis.  Consider discussion with ID and Podiatry.   -family says cognitive decline has been rapid over the last few weeks.  Concern for other neurologic process.  MRI brain, carotid ultrasound, echo pending.    JOSEFA  -likely prerenal  -resolved with IV fluids    Pyoderma gangrenosum  Possible surrounding cellulitis  -continue vanc and Zosyn  -wound care consulted  -consider discussion with Dermatology  -consider Infectious Disease consult for possible surrounding infection.      Essential hypertension  -currently controlled on no medication  -continue to monitor        VTE Risk Mitigation (From admission, onward)           Ordered     heparin (porcine) injection 5,000 Units  Every 8 hours         04/21/23 2325     IP VTE HIGH RISK PATIENT  Once         04/21/23 2325     Place sequential compression device  Until discontinued         04/21/23 2325                    Discharge Planning   EDITA: 4/27/2023     Code Status: Partial Code   Is the patient medically ready for discharge?:     Reason for patient still in hospital (select all that apply): Patient trending condition, Laboratory test, Treatment, Imaging, Consult recommendations, PT / OT recommendations and Pending disposition                     Andres Dukes III, MD  Department of Hospital Medicine   [facility] - Intensive Care ([place]-14)

## 2023-04-24 NOTE — PROGRESS NOTES
"[facility] - Intensive Care ([address] Medicine  Progress Note    Patient Name: Miranda Houston  MRN: [id number]  Patient Class: IP- Inpatient   Admission Date: 4/21/2023  Length of Stay: 3 days  Attending Physician: Andres Dukes III,*  Primary Care Provider: Sabino Calle MD        Subjective:     Principal Problem:CVA (cerebral vascular accident)        HPI:  Per admitting physician:   Ms. Houston is a 72 year old female with PMH notable for advanced dementia (resides at [facility]), HTN, HLD, pyoderma gangrenosum, and chronic rhinitis who presented to [facility] ED via EMS for AMS and "failure to thrive." Per chart review, facility reports poor PO intake and decreased mentation. EMS reports patient was only responsive to painful stimulus.      Initial lab evaluation revealed leukocytosis with WBC 14, stable H/H, sed rate > 120, , Na 162, Cl 119, JOSEFA with BUN/Cr 61/1.8. CT head with no acute intracranial findings. XRAY foot, tib/fib, and ankle without fracture. CXR with small L effusion. In the ED she was given a total of 2.9L IVF and started on broad spectrum abx with infectious work up pending. Toxicology negative.      Critical care consulted for AMS and hypernatremia.           Overview/Hospital Course:  No notes on file    Interval History:  Patient seen and examined with no family at bedside.  Patient is sleeping comfortably at the time of my exam.  She opens her eyes to voice but is not responding today.  Patient wakes and is in obvious discomfort while examining left lower extremity    MRI 4/23 concerning for acute/recent left parietal lobe infarction    Labs reviewed:  WBC improved and WNL.  Sodium improved to 146.  Chloride improved to 114.  Potassium slightly low.    Discussed with palliative Medicine in updated on clinical status.  They will see the patient in the morning and discuss goals of care with patient's son.    I updated patient's son, Angel, on MRI results " concerning for recent/acute stroke.  Patient's son is coming in from [place] in 4/25.  Considering hospice      Objective:     Vital Signs (Most Recent):  Temp: 98 °F (36.7 °C) (04/24/23 1603)  Pulse: 79 (04/24/23 1603)  Resp: 17 (04/24/23 1603)  BP: 139/72 (04/24/23 1603)  SpO2: 99 % (04/24/23 1603) Vital Signs (24h Range):  Temp:  [98 °F (36.7 °C)-98.9 °F (37.2 °C)] 98 °F (36.7 °C)  Pulse:  [66-89] 79  Resp:  [9-30] 17  SpO2:  [95 %-100 %] 99 %  BP: ()/(52-94) 139/72     Weight: 88 kg (194 lb)  Body mass index is 33.3 kg/m².  No intake or output data in the 24 hours ending 04/24/23 1805     Physical Exam  Vitals reviewed.   Constitutional:       General: She is not in acute distress.     Appearance: She is ill-appearing.   HENT:      Mouth/Throat:      Mouth: Mucous membranes are dry.   Cardiovascular:      Rate and Rhythm: Normal rate and regular rhythm.   Pulmonary:      Effort: Pulmonary effort is normal. No respiratory distress.   Abdominal:      General: Bowel sounds are normal. There is no distension.      Palpations: Abdomen is soft.      Tenderness: There is no abdominal tenderness.   Musculoskeletal:      Right lower leg: No edema.      Left lower leg: No edema.   Skin:     Comments: LLE anterior shin ulcer with edema and chronic venous stasis changes.  On better examination, No significant streaking erythema or warmth.           Assessment/Plan:      * CVA (cerebral vascular accident)  Acute encephalopathy on Chronic Dementia  Dehydration  Hypernatremia/hyperchloremia  -continue hypotonic IV fluids, dehydration and electrolyte abnormalities improving.  -on better examination of lower extremity ulcer, does not appear to be infected.  -family says cognitive decline has been rapid over the last few weeks.  Concern for other neurologic process.  MRI brain concerning for recent/subacute left parietal stroke.  -discussed with the patient's son, Angel, who is planning on transitioning to comfort focused  care when he arrives on 04/25.  Angel declines any therapy including PT/OT/ST and declines medications as they would have to be administered rectally.    Pyoderma gangrenosum  -does not appear infected  -wound care consulted  -I called orthopedic surgery who reviewed the images on 04/23 and is not concern for deeper infection.  -discontinue antibiotics.  -continue IV steroids as medication may provide relief to lesion that seems extremely painful.    Essential hypertension  -currently controlled on no medication  -continue to monitor        VTE Risk Mitigation (From admission, onward)         Ordered     enoxaparin injection 40 mg  Daily         04/24/23 0619     IP VTE HIGH RISK PATIENT  Once         04/21/23 2325     Place sequential compression device  Until discontinued         04/21/23 2325                Discharge Planning   EDITA: 4/27/2023     Code Status: DNR   Is the patient medically ready for discharge?: No    Reason for patient still in hospital (select all that apply): Patient trending condition, Treatment and Pending disposition                     Andres Dukes III, MD  Department of Hospital Medicine   [facility] - Intensive Care ([place]

## 2023-04-24 NOTE — NURSING
Patient Aox1, VSS on RA. Bed locked/ lowered, call light/ bedside table within reach.   Most of the shift patient asleep and  lethargic. Md aware.  Patient had episodes of being fully awake but not oriented. Patient clean and dry. Moreno catheter d/c.

## 2023-04-24 NOTE — ASSESSMENT & PLAN NOTE
-does not appear infected  -wound care consulted  -I called orthopedic surgery who reviewed the images on 04/23 and is not concern for deeper infection.  -discontinue antibiotics.  -continue IV steroids as medication may provide relief to lesion that seems extremely painful.

## 2023-04-25 PROBLEM — Z51.5 ENCOUNTER FOR PALLIATIVE CARE: Status: ACTIVE | Noted: 2023-04-25

## 2023-04-25 PROBLEM — Z71.89 COUNSELING REGARDING ADVANCED CARE PLANNING AND GOALS OF CARE: Status: ACTIVE | Noted: 2023-04-25

## 2023-04-25 LAB
BACTERIA BLD CULT: ABNORMAL
POCT GLUCOSE: 119 MG/DL (ref 70–110)
POCT GLUCOSE: 127 MG/DL (ref 70–110)
POCT GLUCOSE: 131 MG/DL (ref 70–110)
POCT GLUCOSE: 143 MG/DL (ref 70–110)
POCT GLUCOSE: 151 MG/DL (ref 70–110)

## 2023-04-25 PROCEDURE — 99232 SBSQ HOSP IP/OBS MODERATE 35: CPT | Mod: ,,, | Performed by: FAMILY MEDICINE

## 2023-04-25 PROCEDURE — 99223 1ST HOSP IP/OBS HIGH 75: CPT | Mod: ,,, | Performed by: NURSE PRACTITIONER

## 2023-04-25 PROCEDURE — 99497 ADVNCD CARE PLAN 30 MIN: CPT | Mod: 25,,, | Performed by: NURSE PRACTITIONER

## 2023-04-25 PROCEDURE — 11000001 HC ACUTE MED/SURG PRIVATE ROOM

## 2023-04-25 PROCEDURE — 99497 PR ADVNCD CARE PLAN 30 MIN: ICD-10-PCS | Mod: 25,,, | Performed by: NURSE PRACTITIONER

## 2023-04-25 PROCEDURE — 51798 US URINE CAPACITY MEASURE: CPT

## 2023-04-25 PROCEDURE — 99900035 HC TECH TIME PER 15 MIN (STAT)

## 2023-04-25 PROCEDURE — 99223 PR INITIAL HOSPITAL CARE,LEVL III: ICD-10-PCS | Mod: ,,, | Performed by: NURSE PRACTITIONER

## 2023-04-25 PROCEDURE — 63600175 PHARM REV CODE 636 W HCPCS: Performed by: FAMILY MEDICINE

## 2023-04-25 PROCEDURE — 94761 N-INVAS EAR/PLS OXIMETRY MLT: CPT

## 2023-04-25 PROCEDURE — 25000003 PHARM REV CODE 250: Performed by: INTERNAL MEDICINE

## 2023-04-25 PROCEDURE — 99232 PR SUBSEQUENT HOSPITAL CARE,LEVL II: ICD-10-PCS | Mod: ,,, | Performed by: FAMILY MEDICINE

## 2023-04-25 PROCEDURE — 27000221 HC OXYGEN, UP TO 24 HOURS

## 2023-04-25 RX ORDER — SODIUM CHLORIDE, SODIUM LACTATE, POTASSIUM CHLORIDE, CALCIUM CHLORIDE 600; 310; 30; 20 MG/100ML; MG/100ML; MG/100ML; MG/100ML
INJECTION, SOLUTION INTRAVENOUS CONTINUOUS
Status: DISCONTINUED | OUTPATIENT
Start: 2023-04-25 | End: 2023-04-29 | Stop reason: HOSPADM

## 2023-04-25 RX ADMIN — MUPIROCIN: 20 OINTMENT TOPICAL at 08:04

## 2023-04-25 RX ADMIN — SODIUM CHLORIDE, POTASSIUM CHLORIDE, SODIUM LACTATE AND CALCIUM CHLORIDE: 600; 310; 30; 20 INJECTION, SOLUTION INTRAVENOUS at 03:04

## 2023-04-25 RX ADMIN — DEXTROSE MONOHYDRATE: 50 INJECTION, SOLUTION INTRAVENOUS at 03:04

## 2023-04-25 RX ADMIN — METHYLPREDNISOLONE SODIUM SUCCINATE 40 MG: 40 INJECTION, POWDER, FOR SOLUTION INTRAMUSCULAR; INTRAVENOUS at 08:04

## 2023-04-25 NOTE — HPI
Ms. Houston is a 73 y/o female with PMHx of advanced dementia, HTN, HLD, pyoderma gangrenosum, and chronic rhinitis who presented to the ED at [facility] on 4/22 for AMS and failure to thrive. Per nursing home records, patient has had poor PO intake and decreased mentation. Labs in the ED notable for WBC 14, sed rate >120, , Na 162, Cl 119, BUN 61, and Cr 1.8. CT head with no acute findings. Patient was hypotensive in the 60s/30s, IV fluids and IV abx initiated. She was admitted to Critical Care on 4/22 with AMS and hypernatremia that improved with D5 infusion. She was stepped down to Hospital Medicine the same day. MRI ordered for evaluation of LLE wound and showed soft tissue ulceration and suspected cellulitis with no evidence of osteomyelitis. MRI brain concerning for acute/recent infarction. Patient seen by SLP on 4/26 and cleared for regular diet.

## 2023-04-25 NOTE — CONSULTS
Palliative Medicine Consult.    Consult received and case discussed with Dr. Dukes. No family at bedside today and patient unable to participate in meaningful conversation due to dementia. Called and spoke with patient's son Angel by phone this evening. He is traveling to [place] from [place] and was in the car with his wife and children. He is amenable to speaking with our team but would prefer to talk in person tomorrow. He expects to be here after noon and I left my business card in the patient's room so he will have my contact information.    Thank you for allowing us to be a part of the care of this patient.    LAURA Urbina, FNP-C  Palliative Medicine ext. [phone]

## 2023-04-25 NOTE — SUBJECTIVE & OBJECTIVE
Interval History: Patient is oriented to self only on exam today. She speaks few words and dozes off easily. Her son Angel is at bedside.    Past Medical History:   Diagnosis Date    Chronic rhinitis     Colon polyps     Hyperlipidemia     Hypertension     Pyoderma gangrenosum     left leg    Steroid-induced diabetes mellitus        Past Surgical History:   Procedure Laterality Date     SECTION      2 surgeries.       Review of patient's allergies indicates:   Allergen Reactions    No known drug allergies        Medications:  Continuous Infusions:   dextrose 5 % (D5W) 50 mL/hr at 23 0431     Scheduled Meds:   enoxaparin  40 mg Subcutaneous Daily    methylPREDNISolone sodium succinate injection  40 mg Intravenous Daily    mupirocin   Nasal BID     PRN Meds:dextrose 10%, dextrose 10%, glucagon (human recombinant), insulin aspart U-100, sodium chloride 0.9%    Family History       Problem Relation (Age of Onset)    Cancer Mother, Father (70)    Diabetes Sister    Heart disease Mother    Hypertension Mother, Sister    Seizures Brother          Tobacco Use    Smoking status: Former     Packs/day: 0.25     Years: 7.00     Pack years: 1.75     Types: Cigarettes     Quit date: 1997     Years since quittin.7    Smokeless tobacco: Never   Substance and Sexual Activity    Alcohol use: Yes     Alcohol/week: 7.0 standard drinks     Types: 7 Glasses of wine per week    Drug use: No    Sexual activity: Not Currently       Review of Systems   Unable to perform ROS: Dementia   Objective:     Vital Signs (Most Recent):  Temp: 97.5 °F (36.4 °C) (23 0900)  Pulse: 75 (23 0900)  Resp: 16 (23 0900)  BP: 122/61 (23 0900)  SpO2: 100 % (23 0900) Vital Signs (24h Range):  Temp:  [97.5 °F (36.4 °C)-98.3 °F (36.8 °C)] 97.5 °F (36.4 °C)  Pulse:  [67-82] 75  Resp:  [14-19] 16  SpO2:  [95 %-100 %] 100 %  BP: (110-156)/(61-83) 122/61     Weight: 88 kg (194 lb)  Body mass index is 33.3  kg/m².    Physical Exam  Vitals and nursing note reviewed.   Constitutional:       General: She is not in acute distress.     Appearance: She is ill-appearing.   HENT:      Head: Normocephalic and atraumatic.   Eyes:      Extraocular Movements: Extraocular movements intact.      Conjunctiva/sclera: Conjunctivae normal.   Cardiovascular:      Rate and Rhythm: Normal rate and regular rhythm.   Pulmonary:      Effort: Pulmonary effort is normal. No respiratory distress.   Abdominal:      General: There is no distension.      Palpations: Abdomen is soft.   Musculoskeletal:      Right lower leg: No edema.      Left lower leg: No edema.   Skin:     General: Skin is warm and dry.      Comments: LLE wound wrapped   Neurological:      Mental Status: She is lethargic.      Motor: Weakness present.      Comments: Oriented to self only       Review of Symptoms      Symptom Assessment (ESAS 0-10 Scale)  Unable to complete assessment due to Dementia         Pain Assessment:  OME in 24 hours:  0  Location(s):      Pain Assessment in Advanced Demential Scale (PAINAD)   Breathing - Independent of vocalization:  0  Negative vocalization:  0  Facial expression:  0  Body language:  0  Consolability:  0  Total:  0    Performance Status:  20    Living Arrangements:  Lives in nursing home    Psychosocial/Cultural:   See Palliative Psychosocial Note: Yes  Patient has been a resident of [facility] since 2020. She is not . She has two sons, three grandchildren and one on the way. She is a retired . Her family's goal is for her to be comfortable.  **Primary  to Follow**  Palliative Care  Consult: Yes    Spiritual:  F - Kamila and Belief:  Sikhism  I - Importance:  Very important  A - Address in Care:  / visits      Advance Care Planning   Advance Directives:   Living Will: No    LaPOST: No    Do Not Resuscitate Status: Yes    Medical Power of : No       Decision Making:  Family answered questions and Patient unable to communicate due to disease severity/cognitive impairment  Goals of Care: The family endorses that what is most important right now is to focus on comfort and QOL     Accordingly, we have decided that the best plan to meet the patient's goals includes enrolling in hospice care       Significant Labs: All pertinent labs within the past 24 hours have been reviewed.  CBC:   Recent Labs   Lab 04/24/23  0406   WBC 11.09   HGB 11.2*   HCT 37.1   MCV 95        BMP:  No results for input(s): GLU, NA, K, CL, CO2, BUN, CREATININE, CALCIUM, MG in the last 24 hours.  LFT:  Lab Results   Component Value Date    AST 24 04/24/2023    ALKPHOS 59 04/24/2023    BILITOT 0.5 04/24/2023     Albumin:   Albumin   Date Value Ref Range Status   04/24/2023 2.0 (L) 3.5 - 5.2 g/dL Final     Protein:   Total Protein   Date Value Ref Range Status   04/24/2023 6.9 6.0 - 8.4 g/dL Final     Lactic acid:   Lab Results   Component Value Date    LACTATE 2.0 03/12/2011    LACTATE 2.0 03/11/2011       Significant Imaging: I have reviewed all pertinent imaging results/findings within the past 24 hours.

## 2023-04-25 NOTE — PLAN OF CARE
[facility] - Intensive Care ([place] [id number])  Initial Discharge Assessment       Primary Care Provider: Sabino Calle MD    Admission Diagnosis: Hypernatremia [E87.0]  Encounter for imaging to screen for metal prior to MRI [Z01.89]  Leg wound, left [S81.802A]  Hypotension, unspecified hypotension type [I95.9]  AMS (altered mental status) [R41.82]  Sepsis, due to unspecified organism, unspecified whether acute organ dysfunction present [A41.9]    Admission Date: 4/21/2023  Expected Discharge Date: 4/27/2023    Discharge Barriers Identified: (P) None    Payor: MEDICARE / Plan: MEDICARE PART A & B / Product Type: Government /     Extended Emergency Contact Information  Primary Emergency Contact: Chidi Carlos   [place]  Mobile Phone: [phone]  Relation: Son  Secondary Emergency Contact: daniel carlos  Mobile Phone: [phone]  Relation: Daughter    Discharge Plan A: (P) Return to nursing home  Discharge Plan B: (P) Return to Nursing Home      [facility] - [place], [place] - [address]  [address]  [place] [address]  Phone: [phone] Fax: [phone]    [facility] - [place] - [address]  [address]  [place] [address]  Phone: [phone] Fax: [phone]    [facility] - [facility] [address]  [address] [address]  Phone: [phone] Fax: [phone]    [id number] - [place] - [address] AT [facility] [address]  [address]  [place] [address]  Phone: [phone] Fax: [phone]      Initial Assessment (most recent)       Adult Discharge Assessment - 04/25/23 1518          Discharge Assessment    Assessment Type Discharge Planning Assessment (P)      Confirmed/corrected address, phone number and insurance Yes (P)      Confirmed Demographics Correct on Facesheet (P)      Source of Information patient (P)      Does patient/caregiver  understand observation status Yes (P)      Communicated EDITA with patient/caregiver Yes (P)      Reason For Admission Hypernatremia (P)      People in Home alone (P)      Facility Arrived From: Home (P)      Do you expect to return to your current living situation? Yes (P)      Do you have help at home or someone to help you manage your care at home? Yes (P)      Who are your caregiver(s) and their phone number(s)? Antoni Estevez, son, [phone] (P)      Prior to hospitilization cognitive status: Alert/Oriented (P)      Current cognitive status: Alert/Oriented (P)      Walking or Climbing Stairs ambulation difficulty, requires equipment (P)      Mobility Management wheelchair (P)      Dressing/Bathing bathing difficulty, requires equipment (P)      Home Layout Able to live on 1st floor (P)      Equipment Currently Used at Home none (P)      Readmission within 30 days? No (P)      Patient currently being followed by outpatient case management? No (P)      Do you currently have service(s) that help you manage your care at home? No (P)      Do you take prescription medications? Yes (P)      Do you have prescription coverage? Yes (P)      Coverage MEDICARE PART A &B (P)      Do you have any problems affording any of your prescribed medications? No (P)      Is the patient taking medications as prescribed? yes (P)      How do you get to doctors appointments? family or friend will provide;car, drives self (P)      Are you on dialysis? No (P)      Do you take coumadin? No (P)      Discharge Plan A Return to nursing home (P)      Discharge Plan B Return to Nursing Home (P)      DME Needed Upon Discharge  none (P)      Discharge Plan discussed with: Patient (P)      Discharge Barriers Identified None (P)                       SW met with patient at the bedside to discuss the dc plan and spoke with patient son reporting that patient admitted to [facility] from [facility] and has been a resident there for 3 years.  Patient  does use a wheelchair to ambulate and is not on dialysis. Patient expected to dc back to [facility] when stable to [place].      Vandana Nguyen LMSW  [facility]   [id number]

## 2023-04-25 NOTE — PLAN OF CARE
Problem: Adult Inpatient Plan of Care  Goal: Plan of Care Review  Outcome: Ongoing, Progressing  Goal: Patient-Specific Goal (Individualized)  Outcome: Ongoing, Progressing  Goal: Absence of Hospital-Acquired Illness or Injury  Outcome: Ongoing, Progressing  Goal: Optimal Comfort and Wellbeing  Outcome: Ongoing, Progressing  Goal: Readiness for Transition of Care  Outcome: Ongoing, Progressing     Problem: Impaired Wound Healing  Goal: Optimal Wound Healing  Outcome: Ongoing, Progressing     Problem: Skin Injury Risk Increased  Goal: Skin Health and Integrity  Outcome: Ongoing, Progressing     Problem: Fall Injury Risk  Goal: Absence of Fall and Fall-Related Injury  Outcome: Ongoing, Progressing     Problem: Infection  Goal: Absence of Infection Signs and Symptoms  Outcome: Ongoing, Progressing     Problem: Fluid and Electrolyte Imbalance (Acute Kidney Injury/Impairment)  Goal: Fluid and Electrolyte Balance  Outcome: Ongoing, Progressing     Problem: Renal Function Impairment (Acute Kidney Injury/Impairment)  Goal: Effective Renal Function  Outcome: Ongoing, Progressing     Problem: Coping Ineffective  Goal: Effective Coping  Outcome: Ongoing, Progressing

## 2023-04-25 NOTE — NURSING
Patient AOX1, VSS on 2 L NC. Patient calm and relaxed. Bed locked and lowered, call light/ bedside table within reach.   No acute events during shift.

## 2023-04-25 NOTE — NURSING
Dr Chicas on call notified at this time due to patient not voiding in several hours. Bladder scan is 258 at this time. Orders to bladder scan again in 4 hours and straight cath if bladder scan above 300ml. Will continue to monitor.

## 2023-04-25 NOTE — PLAN OF CARE
Client alert to self and responds to name. Patient unable to respond to questions approprietly and is confused due to advanced dementia. Patient noted to have 300ml in purewick canister at this time and did not have to straight cath patient. Vitals have kaveh stable throughout shift. Patient repositioned at this time. No other significant changes this shift.     Problem: Adult Inpatient Plan of Care  Goal: Plan of Care Review  Outcome: Ongoing, Progressing  Goal: Patient-Specific Goal (Individualized)  Outcome: Ongoing, Progressing  Goal: Absence of Hospital-Acquired Illness or Injury  Outcome: Ongoing, Progressing  Goal: Optimal Comfort and Wellbeing  Outcome: Ongoing, Progressing  Goal: Readiness for Transition of Care  Outcome: Ongoing, Progressing     Problem: Impaired Wound Healing  Goal: Optimal Wound Healing  Outcome: Ongoing, Progressing     Problem: Skin Injury Risk Increased  Goal: Skin Health and Integrity  Outcome: Ongoing, Progressing     Problem: Fall Injury Risk  Goal: Absence of Fall and Fall-Related Injury  Outcome: Ongoing, Progressing     Problem: Infection  Goal: Absence of Infection Signs and Symptoms  Outcome: Ongoing, Progressing     Problem: Fluid and Electrolyte Imbalance (Acute Kidney Injury/Impairment)  Goal: Fluid and Electrolyte Balance  Outcome: Ongoing, Progressing     Problem: Oral Intake Inadequate (Acute Kidney Injury/Impairment)  Goal: Optimal Nutrition Intake  Outcome: Ongoing, Progressing     Problem: Renal Function Impairment (Acute Kidney Injury/Impairment)  Goal: Effective Renal Function  Outcome: Ongoing, Progressing     Problem: Coping Ineffective  Goal: Effective Coping  Outcome: Ongoing, Progressing

## 2023-04-26 LAB
LEFT CBA DIAS: 23 CM/S
LEFT CBA SYS: 65 CM/S
LEFT CCA DIST DIAS: 10 CM/S
LEFT CCA DIST SYS: 48 CM/S
LEFT CCA MID DIAS: 12 CM/S
LEFT CCA MID SYS: 43 CM/S
LEFT CCA PROX DIAS: 12 CM/S
LEFT CCA PROX SYS: 62 CM/S
LEFT ECA DIAS: 11 CM/S
LEFT ECA SYS: 52 CM/S
LEFT ICA DIST DIAS: 14 CM/S
LEFT ICA DIST SYS: 75 CM/S
LEFT ICA MID DIAS: 14 CM/S
LEFT ICA MID SYS: 41 CM/S
LEFT ICA PROX DIAS: 15 CM/S
LEFT ICA PROX SYS: 43 CM/S
LEFT VERTEBRAL DIAS: 12 CM/S
LEFT VERTEBRAL SYS: 31 CM/S
OHS CV CAROTID RIGHT ICA EDV HIGHEST: 20
OHS CV CAROTID ULTRASOUND LEFT ICA/CCA RATIO: 1.56
OHS CV CAROTID ULTRASOUND RIGHT ICA/CCA RATIO: 1.29
OHS CV PV CAROTID LEFT HIGHEST CCA: 62
OHS CV PV CAROTID LEFT HIGHEST ICA: 75
OHS CV PV CAROTID RIGHT HIGHEST CCA: 60
OHS CV PV CAROTID RIGHT HIGHEST ICA: 63
OHS CV US CAROTID LEFT HIGHEST EDV: 15
POCT GLUCOSE: 111 MG/DL (ref 70–110)
POCT GLUCOSE: 140 MG/DL (ref 70–110)
POCT GLUCOSE: 142 MG/DL (ref 70–110)
POCT GLUCOSE: 142 MG/DL (ref 70–110)
POCT GLUCOSE: 71 MG/DL (ref 70–110)
POCT GLUCOSE: 88 MG/DL (ref 70–110)
POCT GLUCOSE: 98 MG/DL (ref 70–110)
RIGHT ARM DIASTOLIC BLOOD PRESSURE: 84 MMHG
RIGHT ARM SYSTOLIC BLOOD PRESSURE: 143 MMHG
RIGHT CBA DIAS: 8 CM/S
RIGHT CBA SYS: 42 CM/S
RIGHT CCA DIST DIAS: 12 CM/S
RIGHT CCA DIST SYS: 49 CM/S
RIGHT CCA MID DIAS: 11 CM/S
RIGHT CCA MID SYS: 55 CM/S
RIGHT CCA PROX DIAS: 13 CM/S
RIGHT CCA PROX SYS: 60 CM/S
RIGHT ECA DIAS: 14 CM/S
RIGHT ECA SYS: 89 CM/S
RIGHT ICA DIST DIAS: 17 CM/S
RIGHT ICA DIST SYS: 61 CM/S
RIGHT ICA MID DIAS: 18 CM/S
RIGHT ICA MID SYS: 56 CM/S
RIGHT ICA PROX DIAS: 20 CM/S
RIGHT ICA PROX SYS: 63 CM/S
RIGHT VERTEBRAL DIAS: 32 CM/S
RIGHT VERTEBRAL SYS: 73 CM/S

## 2023-04-26 PROCEDURE — 25000003 PHARM REV CODE 250: Performed by: FAMILY MEDICINE

## 2023-04-26 PROCEDURE — 99233 SBSQ HOSP IP/OBS HIGH 50: CPT | Mod: ,,, | Performed by: NURSE PRACTITIONER

## 2023-04-26 PROCEDURE — 94761 N-INVAS EAR/PLS OXIMETRY MLT: CPT

## 2023-04-26 PROCEDURE — 99900035 HC TECH TIME PER 15 MIN (STAT)

## 2023-04-26 PROCEDURE — 11000001 HC ACUTE MED/SURG PRIVATE ROOM

## 2023-04-26 PROCEDURE — 92610 EVALUATE SWALLOWING FUNCTION: CPT

## 2023-04-26 PROCEDURE — 97535 SELF CARE MNGMENT TRAINING: CPT

## 2023-04-26 PROCEDURE — 99233 PR SUBSEQUENT HOSPITAL CARE,LEVL III: ICD-10-PCS | Mod: ,,, | Performed by: FAMILY MEDICINE

## 2023-04-26 PROCEDURE — 25000003 PHARM REV CODE 250

## 2023-04-26 PROCEDURE — 99233 SBSQ HOSP IP/OBS HIGH 50: CPT | Mod: ,,, | Performed by: FAMILY MEDICINE

## 2023-04-26 PROCEDURE — 27000221 HC OXYGEN, UP TO 24 HOURS

## 2023-04-26 PROCEDURE — 99233 PR SUBSEQUENT HOSPITAL CARE,LEVL III: ICD-10-PCS | Mod: ,,, | Performed by: NURSE PRACTITIONER

## 2023-04-26 PROCEDURE — 63600175 PHARM REV CODE 636 W HCPCS: Performed by: FAMILY MEDICINE

## 2023-04-26 RX ORDER — QUETIAPINE FUMARATE 25 MG/1
25 TABLET, FILM COATED ORAL NIGHTLY
Status: DISCONTINUED | OUTPATIENT
Start: 2023-04-26 | End: 2023-04-29 | Stop reason: HOSPADM

## 2023-04-26 RX ORDER — MEMANTINE HYDROCHLORIDE 5 MG/1
10 TABLET ORAL 2 TIMES DAILY
Status: DISCONTINUED | OUTPATIENT
Start: 2023-04-26 | End: 2023-04-29 | Stop reason: HOSPADM

## 2023-04-26 RX ORDER — PREDNISOLONE 15 MG/5ML
60 SOLUTION ORAL DAILY
Qty: 480 ML
Start: 2023-04-26 | End: 2023-04-27 | Stop reason: HOSPADM

## 2023-04-26 RX ORDER — DONEPEZIL HYDROCHLORIDE 5 MG/1
5 TABLET, FILM COATED ORAL EVERY MORNING
Status: DISCONTINUED | OUTPATIENT
Start: 2023-04-26 | End: 2023-04-28

## 2023-04-26 RX ORDER — ASPIRIN 81 MG/1
81 TABLET ORAL DAILY
Status: DISCONTINUED | OUTPATIENT
Start: 2023-04-26 | End: 2023-04-29 | Stop reason: HOSPADM

## 2023-04-26 RX ORDER — GLUCAGON 1 MG
1 KIT INJECTION
Status: DISCONTINUED | OUTPATIENT
Start: 2023-04-26 | End: 2023-04-29 | Stop reason: HOSPADM

## 2023-04-26 RX ORDER — ATORVASTATIN CALCIUM 40 MG/1
40 TABLET, FILM COATED ORAL DAILY
Status: DISCONTINUED | OUTPATIENT
Start: 2023-04-26 | End: 2023-04-29 | Stop reason: HOSPADM

## 2023-04-26 RX ADMIN — DEXTROSE MONOHYDRATE 125 ML: 100 INJECTION, SOLUTION INTRAVENOUS at 05:04

## 2023-04-26 RX ADMIN — ASPIRIN 81 MG: 81 TABLET, COATED ORAL at 10:04

## 2023-04-26 RX ADMIN — MUPIROCIN: 20 OINTMENT TOPICAL at 08:04

## 2023-04-26 RX ADMIN — MEMANTINE HYDROCHLORIDE 10 MG: 5 TABLET ORAL at 08:04

## 2023-04-26 RX ADMIN — QUETIAPINE FUMARATE 25 MG: 25 TABLET ORAL at 08:04

## 2023-04-26 RX ADMIN — ATORVASTATIN CALCIUM 40 MG: 40 TABLET, FILM COATED ORAL at 10:04

## 2023-04-26 RX ADMIN — MEMANTINE HYDROCHLORIDE 10 MG: 5 TABLET ORAL at 10:04

## 2023-04-26 RX ADMIN — METHYLPREDNISOLONE SODIUM SUCCINATE 40 MG: 40 INJECTION, POWDER, FOR SOLUTION INTRAMUSCULAR; INTRAVENOUS at 08:04

## 2023-04-26 RX ADMIN — DONEPEZIL HYDROCHLORIDE 5 MG: 5 TABLET, FILM COATED ORAL at 10:04

## 2023-04-26 NOTE — PLAN OF CARE
[facility]  Department of Hospital Medicine  [address] [place] [address]  [phone] after hours  [phone] fax    NURSING HOME HOSPICE  ORDERS    04/26/2023    Admit to Hospice:  Hospice at facility    Diagnoses:   Active Hospital Problems    Diagnosis  POA    *CVA (cerebral vascular accident) [I63.9]  Yes     Priority: 1 - High    Pyoderma gangrenosum [L88]  Yes     Priority: 2     Encounter for palliative care [Z51.5]  Not Applicable    Counseling regarding advanced care planning and goals of care [Z71.89]  Not Applicable    JOSEFA (acute kidney injury) [N17.9]  Yes    Bacteremia [R78.81]  Yes    Hypernatremia [E87.0]  Yes    Dementia [F03.90]  Yes    Essential hypertension [I10]  Yes     Chronic      Resolved Hospital Problems   No resolved problems to display.       Hospice Qualifying Diagnoses:  advanced dementia, oropharyngeal dysphagia, new CVA       Patient has a life expectancy < 6 months due to:  Primary Hospice Diagnosis:  Advanced dementia, oropharyngeal dysphagia, new CVA    Vital Signs: Routine per Hospice Protocol.    Code Status:  DNR    Allergies:   Review of patient's allergies indicates:   Allergen Reactions    No known drug allergies        Diet:  Regular with thin liquids    Activities: As tolerated    Goals of Care Treatment Preferences:  Code Status: DNR          What is most important right now is to focus on comfort and QOL .  Accordingly, we have decided that the best plan to meet the patient's goals includes enrolling in hospice care.      Nursing: Per Hospice Routine.     Routine Skin for Bedridden Patients: Apply moisture barrier cream to all skin folds and   wet areas in perineal area daily and after baths and all bowel movements.      Oxygen:  For comfort, patient appears to have sleep apnea so would continue oxygen while sleeping.    Wound care:    Recommend continuing current outpatient wound care orders:  Clean with vashe.  Apply  garfield collegen and cover with Ag (silver) foam and secure with non compressing ace wrap. Perform 3 xs weekly.       Medication List        START taking these medications      prednisoLONE 15 mg/5 mL syrup  Commonly known as: PRELONE  Take 20 mLs (60 mg total) by mouth once daily.            CONTINUE taking these medications      donepeziL 5 MG tablet  Commonly known as: ARICEPT  Take 1 tablet (5 mg total) by mouth every morning.     econazole nitrate 1 % cream  Apply topically once daily.     haloperidoL 0.5 MG tablet  Commonly known as: HALDOL  Take 0.5 tablets (0.25 mg total) by mouth every morning.     hydrocortisone 2.5 % cream  Apply topically 3 (three) times daily.     memantine 10 MG Tab  Commonly known as: NAMENDA  Take 1 tablet (10 mg total) by mouth 2 (two) times daily. For memory     mupirocin 2 % ointment  Commonly known as: BACTROBAN  Apply topically 2 (two) times daily.     QUEtiapine 25 MG Tab  Commonly known as: SEROQUEL  Take 1 tablet (25 mg total) by mouth nightly.            STOP taking these medications      amLODIPine 5 MG tablet  Commonly known as: NORVASC     losartan 50 MG tablet  Commonly known as: COZAAR            MEDICATION CLARIFICATIONS  -holding blood pressure medicines as patient not tolerating p.o. due to altered mentation.  BP has been stable while in hospital.  -would only restart p.o. medications if cleared to swallow.  -prednisolone prescribed for treatment of presumed pyoderma gangrenosum of the left shin.  This medication was started after conversation with son as it may provide relief.  Left lower extremity ulcer seems to be causing significant discomfort for patient.  Documentation in the EMR going back to 2011 regarding multiple treatments for pyoderma gangrenosum.      DIABETES CARE:  Patient does have history of diabetes but on multiple fingersticks while in the hospital, well-controlled.  Especially as patient does not tolerating oral diet due to mentation.  We will  defer to hospice director regarding fingersticks and insulin.  ** important to note that the patient has been on steroids and this can cause increase in glucose**       Future Orders:  Hospice Medical Director may dictate new orders for comfortable care measures & sign death certificate.      _________________________________  Andres Dukes III, MD  04/26/2023

## 2023-04-26 NOTE — ASSESSMENT & PLAN NOTE
Impression:  Palliative Medicine consulted for goals of care/end of life planning for this 73 y/o female being followed by Hospital Medicine for CVA, Dementia, JOSEFA, Hypernatremia, Hypertension, and Pyoderma gangrenosum. She presented to [facility]22 from [facility] for AMS and failure to thrive with poor PO intake. Labs in the ED notable for WBC 14, sed rate >120, , Na 162, Cl 119, BUN 61, and Cr 1.8. CT head with no acute findings. She was hypotensive in the 60s/30s, IV fluids and IV abx initiated. She was admitted to Critical Care on 4/22 with AMS and hypernatremia that improved with D5 infusion. She was stepped down to Hospital Medicine the same day. MRI ordered for evaluation of LLE wound and showed soft tissue ulceration and suspected cellulitis with no evidence of osteomyelitis. MRI brain concerning for acute/recent infarction. Patient is oriented to self only on exam today. She speaks few words and dozes off easily. Her son Angel is at bedside.    Plan/Recommendations:  1. See goals of care discussion below.  2. Code status DNR.  3. Family does not want to pursue peg placement. Recommend pleasure feeds as tolerated.  4. Family is amenable to hospice enrollment at discharge. Family will discuss whether home hospice is a realistic option but patient will likely return to [facility] with hospice. Case management to coordinate arrangements.  5. Palliative Medicine will continue to follow up as needed.    CVA/Dementia/JOSEFA/Hypernatremia/Hypertension/Pyoderma gangrenosum - management per primary team/other consultants    Goals of Care/Advance Care Planning:  Conference conducted by this NP and ROXY Do LCSW with patients magno Ortiz to discuss her current clinical status, goals of care, long term expected outcomes and prognostic awareness. After a discussion concerning the patients values and wishes, magno verbalized excellent understanding and insight as it relates to her prognostic  awareness. His stated goal is for the patient to be comfortable. He and his brother are the patients legal next of kin and share in medical decision making. They have jointly agreed to DNR code status. Son is aware that the patients PO intake has been poor but would not want to put her through peg tube placement. We discussed the option of pleasure feeds as tolerated. Son asked about her life expectancy and I explained that we can never say for sure, but that it will depend on how much she is eating or drinking. Explained that she might have days to weeks unless her oral intake increases significantly.    Advance Care Planning     [facility]  We engaged the patient's son in a conversation about advance care planning and we specifically addressed what the goals of care would be moving forward, in light of the patient's change in clinical status, specifically her advanced dementia, new CVA, poor PO intake, and overall debility. We did specifically address the patient's likely prognosis, which is poor. We explored the patient's values and preferences for future care. The son endorses that what is most important right now is to focus on comfort and QOL     Accordingly, we have decided that the best plan to meet the patient's goals includes enrolling in hospice care. I did explain the role for hospice care at this stage of the patient's illness, including its ability to help the patient live with the best quality of life possible. We will be making a hospice referral. Patient will likely return to [facility] with hospice although son will discuss with his brother about whether home hospice is a realistic option.      Case discussed today with Dr. Dukes and primary SW Vandana.

## 2023-04-26 NOTE — PT/OT/SLP EVAL
Speech Language Pathology Evaluation  Bedside Swallow  And Discharge Summary    Patient Name:  Miranda Houston   MRN:  [id number]  Admitting Diagnosis: CVA (cerebral vascular accident)    Recommendations:                 General Recommendations:  Follow-up not indicated  Diet recommendations:  Regular, Thin   Aspiration Precautions: Assistance with meals, Eliminate distractions, Feed only when awake/alert, Frequent oral care, HOB to 90 degrees, Meds whole 1 at a time, Monitor for s/s of aspiration, Remain upright 30 minutes post meal, Small bites/sips, and Standard aspiration precautions   General Precautions: Standard, aspiration, fall  Communication strategies:  provide increased time to answer and go to room if call light pushed    History:     Past Medical History:   Diagnosis Date    Chronic rhinitis     Colon polyps     Hyperlipidemia     Hypertension     Pyoderma gangrenosum     left leg    Steroid-induced diabetes mellitus        Past Surgical History:   Procedure Laterality Date     SECTION      2 surgeries.     Prior Intubation HX:  none this admission     Modified Barium Swallow: none on file    Chest X-Rays: -  Suspected mild left basilar infiltrates superimposed on bilateral basilar atelectatic change and perhaps minimal pleural fluid at the left costophrenic angle.    Prior diet: regular.        Subjective     Spoke with RN prior to entering pt's room . Pt seen bedside for session. Alert and agreeable to ST. Pleasantly confused.      Pain/Comfort:  Pain Rating 1: 0/10  Pain Rating Post-Intervention 1: 0/10    Respiratory Status: Nasal cannula    Objective:     Oral Musculature Evaluation  Oral Musculature: other (see comments), unable to assess due to poor participation/comprehension (no obvious facial asymmetry noted)  Dentition: present and adequate  Secretion Management: adequate  Mucosal Quality: adequate  Volitional Cough: did not follow commands to elicit  Volitional Swallow: did not  follow commands to elicit  Voice Prior to PO Intake: clear    Bedside Swallow Eval:   Consistencies Assessed:  Thin liquids via tsp, cup, single and consecutive straw sips  Puree applesauce  Solids crackers      Oral Phase:   WFL    Pharyngeal Phase:   no overt clinical signs/symptoms of aspiration  no overt clinical signs/symptoms of pharyngeal dysphagia    Compensatory Strategies  None    Treatment: Provided education re: role of ST, POC, diet recs, and swallow precautions.  Pt with decreased level of understanding due to premorbid dementia. No family present. At end of session, pt remained bedside with call light and all needs in reach. White board updated. RN and MD notified of results and recs.       Assessment:     Miranda Houston is a 72 y.o. female with an SLP diagnosis of  functional oropharyngeal swallow .  She is appropriate to advance to regular diet with thin liquids. No ST f/u required at this time; ST to discharge.    Goals:   Multidisciplinary Problems       SLP Goals       Not on file                    Plan:     Patient to be seen:      Plan of Care expires:     Plan of Care reviewed with:  patient   SLP Follow-Up:  No       Discharge recommendations:  other (see comments) (no ST f/u warranted)   Barriers to Discharge:  None    Time Tracking:     SLP Treatment Date:   04/26/23  Speech Start Time:  0826  Speech Stop Time:  0841     Speech Total Time (min):  15 min    Billable Minutes: Eval Swallow and Oral Function 7 and Self Care/Home Management Training 8    04/26/2023

## 2023-04-26 NOTE — PLAN OF CARE
Patient is expected to dc back to [facility] possibly today or tomorrow.      Vandana Nguyen LMSW  [facility]   [id number]

## 2023-04-26 NOTE — SUBJECTIVE & OBJECTIVE
Interval History:  Patient seen and examined with son, Angel, and palliative Medicine at bedside.  Patient has eyes open and responds okay to all my questions.  Angel has decided to discharge patient back to facility on hospice.    Labs reviewed:  WBC improved and WNL.  Sodium improved to 146.  Chloride improved to 114.  Potassium slightly low.    Discussed with palliative Medicine, plan for discharge to facility with hospice.      Objective:     Vital Signs (Most Recent):  Temp: 98.4 °F (36.9 °C) (04/25/23 1700)  Pulse: 78 (04/25/23 1700)  Resp: 18 (04/25/23 1700)  BP: (!) 156/67 (04/25/23 1700)  SpO2: 100 % (04/25/23 1700) Vital Signs (24h Range):  Temp:  [97 °F (36.1 °C)-98.4 °F (36.9 °C)] 98.4 °F (36.9 °C)  Pulse:  [63-81] 78  Resp:  [14-18] 18  SpO2:  [98 %-100 %] 100 %  BP: (122-156)/(61-89) 156/67     Weight: 88 kg (194 lb)  Body mass index is 33.3 kg/m².    Intake/Output Summary (Last 24 hours) at 4/25/2023 2001  Last data filed at 4/25/2023 0450  Gross per 24 hour   Intake 650 ml   Output 400 ml   Net 250 ml        Physical Exam  Vitals reviewed.   Constitutional:       General: She is not in acute distress.     Appearance: She is ill-appearing.   HENT:      Mouth/Throat:      Mouth: Mucous membranes are dry.   Cardiovascular:      Rate and Rhythm: Normal rate and regular rhythm.   Pulmonary:      Effort: Pulmonary effort is normal. No respiratory distress.   Abdominal:      General: Bowel sounds are normal. There is no distension.      Palpations: Abdomen is soft.      Tenderness: There is no abdominal tenderness.   Musculoskeletal:      Right lower leg: No edema.      Left lower leg: No edema.   Skin:     Comments: LLE anterior shin ulcer examined 04/24 with edema and chronic venous stasis changes.  On better examination, No significant streaking erythema or warmth.  Now bandaged with dressing clean, dry, intact

## 2023-04-26 NOTE — PLAN OF CARE
Status unchanged. Inappropriate verbal responses to questions asked. Tolerated po intake fairly well. Carotid US done today. Son at bedside. Safety maintained.

## 2023-04-26 NOTE — SUBJECTIVE & OBJECTIVE
Interval History: Patient more alert on exam today, speaks few words. Her two sons, daughter-in-law, and two young grandchildren are visiting at bedside.    Past Medical History:   Diagnosis Date    Chronic rhinitis     Colon polyps     Hyperlipidemia     Hypertension     Pyoderma gangrenosum     left leg    Steroid-induced diabetes mellitus        Past Surgical History:   Procedure Laterality Date     SECTION      2 surgeries.       Review of patient's allergies indicates:   Allergen Reactions    No known drug allergies        Medications:  Continuous Infusions:   lactated ringers 50 mL/hr at 23 0513     Scheduled Meds:   aspirin  81 mg Oral Daily    atorvastatin  40 mg Oral Daily    donepeziL  5 mg Oral QAM    memantine  10 mg Oral BID    methylPREDNISolone sodium succinate injection  40 mg Intravenous Daily    mupirocin   Nasal BID    QUEtiapine  25 mg Oral Nightly     PRN Meds:glucagon (human recombinant)    Family History       Problem Relation (Age of Onset)    Cancer Mother, Father (70)    Diabetes Sister    Heart disease Mother    Hypertension Mother, Sister    Seizures Brother          Tobacco Use    Smoking status: Former     Packs/day: 0.25     Years: 7.00     Pack years: 1.75     Types: Cigarettes     Quit date: 1997     Years since quittin.7    Smokeless tobacco: Never   Substance and Sexual Activity    Alcohol use: Yes     Alcohol/week: 7.0 standard drinks     Types: 7 Glasses of wine per week    Drug use: No    Sexual activity: Not Currently       Review of Systems   Unable to perform ROS: Dementia   Objective:     Vital Signs (Most Recent):  Temp: 97.4 °F (36.3 °C) (23 1345)  Pulse: 85 (23 1516)  Resp: 16 (23 1345)  BP: 139/73 (23 1345)  SpO2: 100 % (23 1345) Vital Signs (24h Range):  Temp:  [97.4 °F (36.3 °C)-98.2 °F (36.8 °C)] 97.4 °F (36.3 °C)  Pulse:  [65-89] 85  Resp:  [11-36] 16  SpO2:  [98 %-100 %] 100 %  BP: (121-161)/(66-84) 139/73      Weight: 88 kg (194 lb)  Body mass index is 33.3 kg/m².    Physical Exam  Vitals and nursing note reviewed.   Constitutional:       General: She is not in acute distress.     Appearance: She is ill-appearing.   HENT:      Head: Normocephalic and atraumatic.   Eyes:      Extraocular Movements: Extraocular movements intact.      Conjunctiva/sclera: Conjunctivae normal.   Cardiovascular:      Rate and Rhythm: Normal rate and regular rhythm.   Pulmonary:      Effort: Pulmonary effort is normal. No respiratory distress.   Abdominal:      General: There is no distension.      Palpations: Abdomen is soft.   Musculoskeletal:      Right lower leg: No edema.      Left lower leg: No edema.   Skin:     General: Skin is warm and dry.      Comments: LLE wound wrapped   Neurological:      Mental Status: She is alert.      Motor: Weakness present.      Comments: Oriented to self only       Review of Symptoms      Symptom Assessment (ESAS 0-10 Scale)  Unable to complete assessment due to Dementia         Pain Assessment:  OME in 24 hours:  0  Location(s):      Pain Assessment in Advanced Demential Scale (PAINAD)   Breathing - Independent of vocalization:  0  Negative vocalization:  0  Facial expression:  0  Body language:  0  Consolability:  0  Total:  0    Performance Status:  20    Living Arrangements:  Lives in nursing home    Psychosocial/Cultural:   See Palliative Psychosocial Note: Yes  Patient has been a resident of [facility] since 2020. She is not . She has two sons, three grandchildren and one on the way. She is a retired . Her family's goal is for her to be comfortable.  **Primary  to Follow**  Palliative Care  Consult: Yes    Spiritual:  F - Kamila and Belief:  Scientologist  I - Importance:  Very important  A - Address in Care:  / visits      Advance Care Planning   Advance Directives:   Living Will: No    LaPOST: No    Do Not Resuscitate  Status: Yes    Medical Power of : No      Decision Making:  Family answered questions and Patient unable to communicate due to disease severity/cognitive impairment  Goals of Care: The family endorses that what is most important right now is to focus on improvement in condition but with limits to invasive therapies    Accordingly, we have decided that the best plan to meet the patient's goals includes continuing with treatment         Significant Labs: All pertinent labs within the past 24 hours have been reviewed.  CBC:   Recent Labs   Lab 04/24/23  0406   WBC 11.09   HGB 11.2*   HCT 37.1   MCV 95          BMP:  No results for input(s): GLU, NA, K, CL, CO2, BUN, CREATININE, CALCIUM, MG in the last 24 hours.  LFT:  Lab Results   Component Value Date    AST 24 04/24/2023    ALKPHOS 59 04/24/2023    BILITOT 0.5 04/24/2023     Albumin:   Albumin   Date Value Ref Range Status   04/24/2023 2.0 (L) 3.5 - 5.2 g/dL Final     Protein:   Total Protein   Date Value Ref Range Status   04/24/2023 6.9 6.0 - 8.4 g/dL Final     Lactic acid:   Lab Results   Component Value Date    LACTATE 2.0 03/12/2011    LACTATE 2.0 03/11/2011       Significant Imaging: I have reviewed all pertinent imaging results/findings within the past 24 hours.

## 2023-04-26 NOTE — PLAN OF CARE
Advance Care Planning   [facility] - Intensive Care ([place] [address])  Palliative Care   Psychosocial Assessment    Patient Name: Miranda Houston  MRN: [id number]  Admission Date: 4/21/2023  Hospital Length of Stay: 5 days  Code Status: DNR   Attending Provider: Andres Dukes III,*  Palliative Care Provider: Nita Crowell NP  Primary Care Physician: Sabino Calle MD  Principal Problem:CVA (cerebral vascular accident)    Reason for Referral:  Advanced Care Planning and Psychosocial Support       Present during Interview: patient and son/Angel .      Primary Language:English   Needed: no      Past Medical Situation:   PMH:   Past Medical History:   Diagnosis Date    Chronic rhinitis     Colon polyps     Hyperlipidemia     Hypertension     Pyoderma gangrenosum     left leg    Steroid-induced diabetes mellitus      Mental Health/Substance Use History: None identified   Risk of Abuse, neglect or exploitation: None identified   Current or Previous Trauma and/or evidence of PTSD: None Identified   Non-traditional Health practices: None identified     Understanding of diagnosis and prognosis: Good   Experience/Comfort level with health care system: Good (son is an ED physician in [place])    Patients Mental Status: Alert with confusion and intermittent garbled speech     Socio-Economic Factors/Resources:  Address: [address] [place] [address]  Phone Number: [phone] (home) [phone] (work)    Marital Status: Single  Household composition: [facility]   Children: Two sons     Patient/Family perceptions about Caregiving Needs; availability and capacity: Needs met by facility. Son states he and family may consider taking patient home with sitters/family care in her final weeks.     Family Dynamics/Relationships: Healthy     Patient/Family Strengths/Resilience: Son exhibits an excellent understanding of patient's prognosis and hopes to ensure patient's comfort at the  end of life.   Patient/Family Coping: Appropriately      Activities of Daily Living: Dependent with ADLs   Support Systems-Family & Community (Home Health, HME etc):  Nursing home staff     Transportation:  yes    Work/Education History: retired   Self-Care Activities/Hobbies: Visiting with family      History: no    Financial Resources:Medicare      Advanced Care Planning & Legal Concerns:   Advanced Directives/Living Will: no  LaPOST/POLST: no   Planning:  no    Power of : no    Emergency Contacts: Son/Angel Houston     Spirituality, Culture & Coping Mechanisms:  F- Kamila and Belief: Lutheran     I - Importance: High     C - Community/Culture Values: Patient was highly involved in [employer] and prayed rosary daily      A - Address in Care: Son requests anointing of the sick       Goals/Hopes/Expectations: Son wishes to keep patient comfortable   Fears/Anxiety/Concerns: No specific concerns noted.  Son is experiencing a great deal of life changes at the moment with the birth of his third child in the next month coupled with patient's decline in health.           Complicated Bereavement Risk Assessment Tool (CBRAT)  Reference:  [facility] Clinical Practice Group (May 2016). Bereavement Risk Screening and Management Guidelines.  Retrieved from: http://www.grpcc.com.au/wp-content/uploads//NIZEG-Tdkbxiahpgb-Ptawsawcg-and-Management-Guideline-2016.pdf      Bereaved Client Characteristics   Under 18      no  Was a Twin   no  Young Spouse   no  Elderly Spouse    no  Isolated    no  Lacks Meaningful Social Support   no  Dissatisfied with help available during illness   no  New to Financial [place] no  New to Decision-Making   no    Illness  Inherited Disorder   no  Stigmatized Disease in the family/community   no  Lengthy/Burdensome   yes     Bereaved Client's History of Loss   Cumulative Multiple Losses   no  Previous Mental Health Illnesses    no  Current Mental Health Illness   no  Other Significant Health Issues   no   Migrant/Refugee   no Death  Sudden or Unexpected   no  Traumatic Circumstances Associated with Death   no  Significant Cultural/Social Burdens as a result of Death   yes   Relationship with   Profound Lifelong Partner   no  Highly Dependent    no  Antagonistic   no  Ambivalent   no  Deeply Connected   yes  Culturally Defined   yes   Risk Factors Scores  0-2  Low  3-5  Moderate  5+  High  All persons scoring moderate to high presume to be at risk**    (** It is acknowledged that protective factors and resilience may outweigh apparent risk factors.      Total Risk Factors Score:   Low to moderate.  Patient's son is expecting his third child under the age of two years in the next month. Son reports he has accepted mother's prognosis and feels her passing would be a release however timing of the passing with the birth of his child may produce feelings of complicated bereavement.       Advance Care Planning     Date: 2023    [facility]  Team engaged the  son/Angel  in a conversation about advance care planning and we specifically addressed what the goals of care would be moving forward, in light of the patient's change in clinical status, specifically CVA and dementia. We explored the patient's values and preferences for future care.  The  son  endorses that what is most important right now is to focus on comfort and QOL     Accordingly, we have decided that the best plan to meet the patient's goals includes no further escalation in treatment and focus on patient's comfort.     Team did explain the role for hospice care at this stage of the patient's illness, including its ability to help the patient live with the best quality of life possible.       Plan of Care:     SW accompanied NP during visit with patient's son/Angel at bedside.  Son exhibits a great awareness of patient's medical concerns and voices his preference to avoid  invasive measures (tube feeds) and focus on comfort. Team educated son regarding hospice services administered in the home and nursing facility setting. Son reports he and his brother will discuss whether patient will return to nursing home with hospice or discharge to a family residence with home hospice.  Presently patient's son resides in [place] where he plans to return this weekend. Son has two children under the age of two with another on the way in a matter of weeks.  Patient has another son/Chidi who resides in the [facility]. Son reflected upon patient's decades long career as a .  Patient enjoyed mentoring young children, including her own and eventual grandchildren. Patient is described as a devout Episcopalian who enjoyed tending to her garden.  Son appears to be coping appropriately at this time. Son denies psychosocial concerns. SW remains available to provide emotional and psychosocial support; SW will continue to follow.       Ana Maria Do LCSW    Palliative Medicine

## 2023-04-26 NOTE — ASSESSMENT & PLAN NOTE
-appreciate palliative Medicine assistance  -plan on discharge back to facility on hospice  -son does not want to transition to comfort care in the hospital.

## 2023-04-26 NOTE — PROGRESS NOTES
"[facility] - Intensive Care ([address] Medicine  Progress Note    Patient Name: Miranda Houston  MRN: [id number]  Patient Class: IP- Inpatient   Admission Date: 4/21/2023  Length of Stay: 4 days  Attending Physician: Andres Dukes III,*  Primary Care Provider: Sabino Calle MD        Subjective:     Principal Problem:CVA (cerebral vascular accident)        HPI:  Per admitting physician:   Ms. Houston is a 72 year old female with PMH notable for advanced dementia (resides at [facility]), HTN, HLD, pyoderma gangrenosum, and chronic rhinitis who presented to [facility] ED via EMS for AMS and "failure to thrive." Per chart review, facility reports poor PO intake and decreased mentation. EMS reports patient was only responsive to painful stimulus.      Initial lab evaluation revealed leukocytosis with WBC 14, stable H/H, sed rate > 120, , Na 162, Cl 119, JOSEFA with BUN/Cr 61/1.8. CT head with no acute intracranial findings. XRAY foot, tib/fib, and ankle without fracture. CXR with small L effusion. In the ED she was given a total of 2.9L IVF and started on broad spectrum abx with infectious work up pending. Toxicology negative.      Critical care consulted for AMS and hypernatremia.           Overview/Hospital Course:  No notes on file    Interval History:  Patient seen and examined with son, Angel, and palliative Medicine at bedside.  Patient has eyes open and responds okay to all my questions.  Angel has decided to discharge patient back to facility on hospice.      Discussed with palliative Medicine, plan for discharge to facility with hospice.      Objective:     Vital Signs (Most Recent):  Temp: 98.4 °F (36.9 °C) (04/25/23 1700)  Pulse: 78 (04/25/23 1700)  Resp: 18 (04/25/23 1700)  BP: (!) 156/67 (04/25/23 1700)  SpO2: 100 % (04/25/23 1700) Vital Signs (24h Range):  Temp:  [97 °F (36.1 °C)-98.4 °F (36.9 °C)] 98.4 °F (36.9 °C)  Pulse:  [63-81] 78  Resp:  [14-18] 18  SpO2:  [98 %-100 %] 100 " %  BP: (122-156)/(61-89) 156/67     Weight: 88 kg (194 lb)  Body mass index is 33.3 kg/m².    Intake/Output Summary (Last 24 hours) at 4/25/2023 2001  Last data filed at 4/25/2023 0450  Gross per 24 hour   Intake 650 ml   Output 400 ml   Net 250 ml        Physical Exam  Vitals reviewed.   Constitutional:       General: She is not in acute distress.     Appearance: She is ill-appearing.   HENT:      Mouth/Throat:      Mouth: Mucous membranes are dry.   Cardiovascular:      Rate and Rhythm: Normal rate and regular rhythm.   Pulmonary:      Effort: Pulmonary effort is normal. No respiratory distress.   Abdominal:      General: Bowel sounds are normal. There is no distension.      Palpations: Abdomen is soft.      Tenderness: There is no abdominal tenderness.   Musculoskeletal:      Right lower leg: No edema.      Left lower leg: No edema.   Skin:     Comments: LLE anterior shin ulcer examined 04/24 with edema and chronic venous stasis changes.  No significant streaking erythema or warmth.  Now bandaged with dressing clean, dry, intact             Assessment/Plan:      * CVA (cerebral vascular accident)  Acute encephalopathy on Chronic Dementia  Dehydration  Hypernatremia/hyperchloremia  -continue hypotonic IV fluids, dehydration and electrolyte abnormalities improving.  -on better examination of lower extremity ulcer, does not appear to be infected.  -family says cognitive decline has been rapid over the last few weeks.  Concern for other neurologic process.  MRI brain concerning for recent/subacute left parietal stroke.  -discussed with the patient's son, Angel, who is planning on transitioning to hospice on discharge.  Angel declines any therapy including PT/OT/ST and declines medications as they would have to be administered rectally.    Pyoderma gangrenosum  -does not appear infected  -wound care consulted  -I called orthopedic surgery who reviewed the images on 04/23 and is not concern for deeper  infection.  -discontinue antibiotics.  -continue IV steroids as medication may provide relief to lesion that seems extremely painful.    Encounter for palliative care  -appreciate palliative Medicine assistance  -plan on discharge back to facility on hospice  -son does not want to transition to comfort care in the hospital.    JOSEFA (acute kidney injury)  -resolved with IV fluids, likely prerenal    Essential hypertension  -currently controlled on no medication  -continue to monitor      VTE Risk Mitigation (From admission, onward)           Ordered     IP VTE HIGH RISK PATIENT  Once         04/21/23 2325     Place sequential compression device  Until discontinued         04/21/23 2325                    Discharge Planning   EDITA: 4/26/2023     Code Status: DNR   Is the patient medically ready for discharge?: Yes    Reason for patient still in hospital (select all that apply): Patient trending condition and Pending disposition  Discharge Plan A: Return to nursing home                  Andres Dukes III, MD  Department of Hospital Medicine   [facility] - Intensive [facility] ([facility]

## 2023-04-26 NOTE — CONSULTS
[facility] - Intensive Care ([place] [address])  Palliative Medicine  Consult Note  4/25/2023    Patient Name: Miranda Houston  MRN: [id number]  Admission Date: 4/21/2023  Hospital Length of Stay: 4 days  Code Status: DNR   Attending Provider: Andres Dukes III,*  Consulting Provider: Nita Crowell NP  Primary Care Physician: Sabino Calle MD  Principal Problem:CVA (cerebral vascular accident)    Patient information was obtained from relative(s), past medical records and primary team.      Consults  Assessment/Plan:     Palliative Care  Encounter for palliative care  Impression:  Palliative Medicine consulted for goals of care/end of life planning for this 73 y/o female being followed by Hospital Medicine for CVA, Dementia, JOSEFA, Hypernatremia, Hypertension, and Pyoderma gangrenosum. She presented to [facility] on 4/22 from [facility] for AMS and failure to thrive with poor PO intake. Labs in the ED notable for WBC 14, sed rate >120, , Na 162, Cl 119, BUN 61, and Cr 1.8. CT head with no acute findings. She was hypotensive in the 60s/30s, IV fluids and IV abx initiated. She was admitted to Critical Care on 4/22 with AMS and hypernatremia that improved with D5 infusion. She was stepped down to Hospital Medicine the same day. MRI ordered for evaluation of LLE wound and showed soft tissue ulceration and suspected cellulitis with no evidence of osteomyelitis. MRI brain concerning for acute/recent infarction. Patient is oriented to self only on exam today. She speaks few words and dozes off easily. Her son Angel is at bedside.    Plan/Recommendations:  1. See goals of care discussion below.  2. Code status DNR.  3. Family does not want to pursue peg placement. Recommend pleasure feeds as tolerated.  4. Family is amenable to hospice enrollment at discharge. Family will discuss whether home hospice is a realistic option but patient will likely return to [facility] with hospice. Case management  to coordinate arrangements.  5. Palliative Medicine will continue to follow up as needed.    CVA/Dementia/JOSEFA/Hypernatremia/Hypertension/Pyoderma gangrenosum - management per primary team/other consultants    Goals of Care/Advance Care Planning:  Conference conducted by this NP and ROXY Do LCSW with patients magno Ortiz to discuss her current clinical status, goals of care, long term expected outcomes and prognostic awareness. After a discussion concerning the patients values and wishes, son verbalized excellent understanding and insight as it relates to her prognostic awareness. His stated goal is for the patient to be comfortable. He and his brother are the patients legal next of kin and share in medical decision making. They have jointly agreed to DNR code status. Son is aware that the patients PO intake has been poor but would not want to put her through peg tube placement. We discussed the option of pleasure feeds as tolerated. Son asked about her life expectancy and I explained that we can never say for sure, but that it will depend on how much she is eating or drinking. Explained that she might have days to weeks unless her oral intake increases significantly.    Advance Care Planning     [facility]  We engaged the patient's son in a conversation about advance care planning and we specifically addressed what the goals of care would be moving forward, in light of the patient's change in clinical status, specifically her advanced dementia, new CVA, poor PO intake, and overall debility. We did specifically address the patient's likely prognosis, which is poor. We explored the patient's values and preferences for future care. The son endorses that what is most important right now is to focus on comfort and QOL     Accordingly, we have decided that the best plan to meet the patient's goals includes enrolling in hospice care. I did explain the role for hospice care at this stage of the patient's illness, including  its ability to help the patient live with the best quality of life possible. We will be making a hospice referral. Patient will likely return to [facility] with hospice although son will discuss with his brother about whether home hospice is a realistic option.     Case discussed today with Dr. Dukes and primary KANDY Ross.        Thank you for your consult. I will follow-up with patient. Please contact us if you have any additional questions.    Subjective:     HPI:   Ms. Houston is a 71 y/o female with PMHx of advanced dementia, HTN, HLD, pyoderma gangrenosum, and chronic rhinitis who presented to the ED at [facility] on  for AMS and failure to thrive. Per nursing home records, patient has had poor PO intake and decreased mentation. Labs in the ED notable for WBC 14, sed rate >120, , Na 162, Cl 119, BUN 61, and Cr 1.8. CT head with no acute findings. Patient was hypotensive in the 60s/30s, IV fluids and IV abx initiated. She was admitted to Critical Care on  with AMS and hypernatremia that improved with D5 infusion. She was stepped down to Hospital Medicine the same day. MRI ordered for evaluation of LLE wound and showed soft tissue ulceration and suspected cellulitis with no evidence of osteomyelitis. MRI brain concerning for acute/recent infarction.      Hospital Course:  No notes on file    Interval History: Patient is oriented to self only on exam today. She speaks few words and dozes off easily. Her son Angel is at bedside.    Past Medical History:   Diagnosis Date    Chronic rhinitis     Colon polyps     Hyperlipidemia     Hypertension     Pyoderma gangrenosum     left leg    Steroid-induced diabetes mellitus        Past Surgical History:   Procedure Laterality Date     SECTION      2 surgeries.       Review of patient's allergies indicates:   Allergen Reactions    No known drug allergies        Medications:  Continuous Infusions:   dextrose 5 % (D5W) 50 mL/hr at  23 0431     Scheduled Meds:   enoxaparin  40 mg Subcutaneous Daily    methylPREDNISolone sodium succinate injection  40 mg Intravenous Daily    mupirocin   Nasal BID     PRN Meds:dextrose 10%, dextrose 10%, glucagon (human recombinant), insulin aspart U-100, sodium chloride 0.9%    Family History       Problem Relation (Age of Onset)    Cancer Mother, Father (70)    Diabetes Sister    Heart disease Mother    Hypertension Mother, Sister    Seizures Brother          Tobacco Use    Smoking status: Former     Packs/day: 0.25     Years: 7.00     Pack years: 1.75     Types: Cigarettes     Quit date: 1997     Years since quittin.7    Smokeless tobacco: Never   Substance and Sexual Activity    Alcohol use: Yes     Alcohol/week: 7.0 standard drinks     Types: 7 Glasses of wine per week    Drug use: No    Sexual activity: Not Currently       Review of Systems   Unable to perform ROS: Dementia   Objective:     Vital Signs (Most Recent):  Temp: 97.5 °F (36.4 °C) (23 0900)  Pulse: 75 (23 0900)  Resp: 16 (23 0900)  BP: 122/61 (23 0900)  SpO2: 100 % (23 0900) Vital Signs (24h Range):  Temp:  [97.5 °F (36.4 °C)-98.3 °F (36.8 °C)] 97.5 °F (36.4 °C)  Pulse:  [67-82] 75  Resp:  [14-19] 16  SpO2:  [95 %-100 %] 100 %  BP: (110-156)/(61-83) 122/61     Weight: 88 kg (194 lb)  Body mass index is 33.3 kg/m².    Physical Exam  Vitals and nursing note reviewed.   Constitutional:       General: She is not in acute distress.     Appearance: She is ill-appearing.   HENT:      Head: Normocephalic and atraumatic.   Eyes:      Extraocular Movements: Extraocular movements intact.      Conjunctiva/sclera: Conjunctivae normal.   Cardiovascular:      Rate and Rhythm: Normal rate and regular rhythm.   Pulmonary:      Effort: Pulmonary effort is normal. No respiratory distress.   Abdominal:      General: There is no distension.      Palpations: Abdomen is soft.   Musculoskeletal:      Right lower leg:  No edema.      Left lower leg: No edema.   Skin:     General: Skin is warm and dry.      Comments: LLE wound wrapped   Neurological:      Mental Status: She is lethargic.      Motor: Weakness present.      Comments: Oriented to self only       Review of Symptoms      Symptom Assessment (ESAS 0-10 Scale)  Unable to complete assessment due to Dementia         Pain Assessment:  OME in 24 hours:  0  Location(s):      Pain Assessment in Advanced Demential Scale (PAINAD)   Breathing - Independent of vocalization:  0  Negative vocalization:  0  Facial expression:  0  Body language:  0  Consolability:  0  Total:  0    Performance Status:  20    Living Arrangements:  Lives in nursing home    Psychosocial/Cultural:   See Palliative Psychosocial Note: Yes  Patient has been a resident of [facility] since 2020. She is not . She has two sons, three grandchildren and one on the way. She is a retired . Her family's goal is for her to be comfortable.  **Primary  to Follow**  Palliative Care  Consult: Yes    Spiritual:  F - Kamila and Belief:  Religion  I - Importance:  Very important  A - Address in Care:  / visits      Advance Care Planning   Advance Directives:   Living Will: No    LaPOST: No    Do Not Resuscitate Status: Yes    Medical Power of : No      Decision Making:  Family answered questions and Patient unable to communicate due to disease severity/cognitive impairment  Goals of Care: The family endorses that what is most important right now is to focus on comfort and QOL     Accordingly, we have decided that the best plan to meet the patient's goals includes enrolling in hospice care       Significant Labs: All pertinent labs within the past 24 hours have been reviewed.  CBC:   Recent Labs   Lab 04/24/23  0406   WBC 11.09   HGB 11.2*   HCT 37.1   MCV 95        BMP:  No results for input(s): GLU, NA, K, CL, CO2, BUN, CREATININE,  CALCIUM, MG in the last 24 hours.  LFT:  Lab Results   Component Value Date    AST 24 04/24/2023    ALKPHOS 59 04/24/2023    BILITOT 0.5 04/24/2023     Albumin:   Albumin   Date Value Ref Range Status   04/24/2023 2.0 (L) 3.5 - 5.2 g/dL Final     Protein:   Total Protein   Date Value Ref Range Status   04/24/2023 6.9 6.0 - 8.4 g/dL Final     Lactic acid:   Lab Results   Component Value Date    LACTATE 2.0 03/12/2011    LACTATE 2.0 03/11/2011       Significant Imaging: I have reviewed all pertinent imaging results/findings within the past 24 hours.        20 minutes spent in advanced care planning    Nita Crowell NP  Palliative Medicine  [facility] - Intensive Care ([place]-14)

## 2023-04-26 NOTE — ASSESSMENT & PLAN NOTE
Acute encephalopathy on Chronic Dementia  Dehydration  Hypernatremia/hyperchloremia  -continue hypotonic IV fluids, dehydration and electrolyte abnormalities improving.  -on better examination of lower extremity ulcer, does not appear to be infected.  -family says cognitive decline has been rapid over the last few weeks.  Concern for other neurologic process.  MRI brain concerning for recent/subacute left parietal stroke.  -discussed with the patient's son, Angel, who is planning on transitioning to hospice on discharge.  Angel declines any therapy including PT/OT/ST and declines medications as they would have to be administered rectally.

## 2023-04-26 NOTE — PLAN OF CARE
Client is alert but cant state her name time or where she is. Patient is confused due to advanced dementia. Vitals stable this shift. No significant changes noted.     Problem: Adult Inpatient Plan of Care  Goal: Plan of Care Review  Outcome: Ongoing, Progressing  Goal: Patient-Specific Goal (Individualized)  Outcome: Ongoing, Progressing  Goal: Absence of Hospital-Acquired Illness or Injury  Outcome: Ongoing, Progressing  Goal: Optimal Comfort and Wellbeing  Outcome: Ongoing, Progressing  Goal: Readiness for Transition of Care  Outcome: Ongoing, Progressing

## 2023-04-26 NOTE — CHAPLAIN
Palliative Care     Patient: Miranda Houston       MRN: [id number]  : 1950  Age: 72 y.o.  Hospital Length of Stay: 5 days  Code Status: DNR   Attending Provider: Andres Dukes III,*  Principal Problem: CVA (cerebral vascular accident)    Present during Interview:  Visited pall med pt accompanying pall med NP; pt's two sons, DIL and two young grandchildren present.    Non-clinical observations:  Pt looked comfortable and relaxed; no distress    Facts (Spiritual Perception of Illness):   Dementia dx+    Feelings (Emotions/Fears/Experiences/Coping):      Unable to deeply assess pt's feelings, but she was very pleasant, saying simple words/statements; smiled.  Pall Med NP took the lead, today more of an introduction meeting for . Family seemed understandably calmly upset, but very stoic.    Family/Friends (Support, Community, Culture):     Sons bedside, one is an ER doc from [place]. Pt well loved and supported.     Kamila (Beliefs/Meaning/Philosophy):  Pt and family are Protestant. Fr. Leroy anointed pt on . One son asked for last rites and the other said that that was completed while at the nursing home. I texted Fr. Leory today but did not hear back, he may be off today.    Future (Spiritual Care Plan of Action):  Palliative care  will continue to follow, Lord, in your meryc.    In Peace,  Rev. Amparo Ramirez MBA, MDiv

## 2023-04-27 LAB
ANION GAP SERPL CALC-SCNC: 9 MMOL/L (ref 8–16)
BACTERIA BLD CULT: NORMAL
BACTERIA BLD CULT: NORMAL
BUN SERPL-MCNC: 15 MG/DL (ref 8–23)
CALCIUM SERPL-MCNC: 8.7 MG/DL (ref 8.7–10.5)
CHLORIDE SERPL-SCNC: 110 MMOL/L (ref 95–110)
CO2 SERPL-SCNC: 23 MMOL/L (ref 23–29)
CREAT SERPL-MCNC: 0.8 MG/DL (ref 0.5–1.4)
ERYTHROCYTE [DISTWIDTH] IN BLOOD BY AUTOMATED COUNT: 13.7 % (ref 11.5–14.5)
EST. GFR  (NO RACE VARIABLE): >60 ML/MIN/1.73 M^2
GLUCOSE SERPL-MCNC: 78 MG/DL (ref 70–110)
HCT VFR BLD AUTO: 34.4 % (ref 37–48.5)
HGB BLD-MCNC: 10.6 G/DL (ref 12–16)
MAGNESIUM SERPL-MCNC: 2.2 MG/DL (ref 1.6–2.6)
MCH RBC QN AUTO: 29.3 PG (ref 27–31)
MCHC RBC AUTO-ENTMCNC: 30.8 G/DL (ref 32–36)
MCV RBC AUTO: 95 FL (ref 82–98)
PHOSPHATE SERPL-MCNC: 2.6 MG/DL (ref 2.7–4.5)
PLATELET # BLD AUTO: 385 K/UL (ref 150–450)
PMV BLD AUTO: 11.5 FL (ref 9.2–12.9)
POCT GLUCOSE: 133 MG/DL (ref 70–110)
POCT GLUCOSE: 139 MG/DL (ref 70–110)
POCT GLUCOSE: 153 MG/DL (ref 70–110)
POCT GLUCOSE: 91 MG/DL (ref 70–110)
POTASSIUM SERPL-SCNC: 3.4 MMOL/L (ref 3.5–5.1)
RBC # BLD AUTO: 3.62 M/UL (ref 4–5.4)
SARS-COV-2 RNA RESP QL NAA+PROBE: NOT DETECTED
SODIUM SERPL-SCNC: 142 MMOL/L (ref 136–145)
WBC # BLD AUTO: 14.45 K/UL (ref 3.9–12.7)

## 2023-04-27 PROCEDURE — 11000001 HC ACUTE MED/SURG PRIVATE ROOM

## 2023-04-27 PROCEDURE — 83735 ASSAY OF MAGNESIUM: CPT | Performed by: FAMILY MEDICINE

## 2023-04-27 PROCEDURE — 80048 BASIC METABOLIC PNL TOTAL CA: CPT | Performed by: FAMILY MEDICINE

## 2023-04-27 PROCEDURE — 36415 COLL VENOUS BLD VENIPUNCTURE: CPT | Performed by: FAMILY MEDICINE

## 2023-04-27 PROCEDURE — 84100 ASSAY OF PHOSPHORUS: CPT | Performed by: FAMILY MEDICINE

## 2023-04-27 PROCEDURE — 99233 SBSQ HOSP IP/OBS HIGH 50: CPT | Mod: ,,, | Performed by: FAMILY MEDICINE

## 2023-04-27 PROCEDURE — 99900035 HC TECH TIME PER 15 MIN (STAT)

## 2023-04-27 PROCEDURE — 97535 SELF CARE MNGMENT TRAINING: CPT

## 2023-04-27 PROCEDURE — U0005 INFEC AGEN DETEC AMPLI PROBE: HCPCS | Performed by: FAMILY MEDICINE

## 2023-04-27 PROCEDURE — 30200315 PPD INTRADERMAL TEST REV CODE 302: Performed by: FAMILY MEDICINE

## 2023-04-27 PROCEDURE — 97166 OT EVAL MOD COMPLEX 45 MIN: CPT

## 2023-04-27 PROCEDURE — U0003 INFECTIOUS AGENT DETECTION BY NUCLEIC ACID (DNA OR RNA); SEVERE ACUTE RESPIRATORY SYNDROME CORONAVIRUS 2 (SARS-COV-2) (CORONAVIRUS DISEASE [COVID-19]), AMPLIFIED PROBE TECHNIQUE, MAKING USE OF HIGH THROUGHPUT TECHNOLOGIES AS DESCRIBED BY CMS-2020-01-R: HCPCS | Performed by: FAMILY MEDICINE

## 2023-04-27 PROCEDURE — 94761 N-INVAS EAR/PLS OXIMETRY MLT: CPT

## 2023-04-27 PROCEDURE — 86580 TB INTRADERMAL TEST: CPT | Performed by: FAMILY MEDICINE

## 2023-04-27 PROCEDURE — 25000003 PHARM REV CODE 250: Performed by: FAMILY MEDICINE

## 2023-04-27 PROCEDURE — 99233 PR SUBSEQUENT HOSPITAL CARE,LEVL III: ICD-10-PCS | Mod: ,,, | Performed by: FAMILY MEDICINE

## 2023-04-27 PROCEDURE — 85027 COMPLETE CBC AUTOMATED: CPT | Performed by: FAMILY MEDICINE

## 2023-04-27 PROCEDURE — 97162 PT EVAL MOD COMPLEX 30 MIN: CPT

## 2023-04-27 PROCEDURE — 97112 NEUROMUSCULAR REEDUCATION: CPT

## 2023-04-27 PROCEDURE — 63600175 PHARM REV CODE 636 W HCPCS: Performed by: FAMILY MEDICINE

## 2023-04-27 PROCEDURE — 27000221 HC OXYGEN, UP TO 24 HOURS

## 2023-04-27 RX ORDER — ENOXAPARIN SODIUM 100 MG/ML
40 INJECTION SUBCUTANEOUS EVERY 24 HOURS
Status: DISCONTINUED | OUTPATIENT
Start: 2023-04-27 | End: 2023-04-29 | Stop reason: HOSPADM

## 2023-04-27 RX ORDER — PREDNISOLONE SODIUM PHOSPHATE 15 MG/5ML
60 SOLUTION ORAL DAILY
Status: DISCONTINUED | OUTPATIENT
Start: 2023-04-27 | End: 2023-04-29 | Stop reason: HOSPADM

## 2023-04-27 RX ORDER — ASPIRIN 81 MG/1
81 TABLET ORAL DAILY
Refills: 0
Start: 2023-04-27 | End: 2024-04-26

## 2023-04-27 RX ORDER — ATORVASTATIN CALCIUM 40 MG/1
40 TABLET, FILM COATED ORAL DAILY
Qty: 90 TABLET | Refills: 3
Start: 2023-04-27 | End: 2024-04-26

## 2023-04-27 RX ORDER — PREDNISOLONE SODIUM PHOSPHATE 15 MG/5ML
60 SOLUTION ORAL DAILY
Qty: 560 ML | Refills: 0 | OUTPATIENT
Start: 2023-04-27 | End: 2023-05-25

## 2023-04-27 RX ADMIN — POTASSIUM BICARBONATE 40 MEQ: 391 TABLET, EFFERVESCENT ORAL at 09:04

## 2023-04-27 RX ADMIN — TUBERCULIN PURIFIED PROTEIN DERIVATIVE 5 UNITS: 5 INJECTION, SOLUTION INTRADERMAL at 03:04

## 2023-04-27 RX ADMIN — ATORVASTATIN CALCIUM 40 MG: 40 TABLET, FILM COATED ORAL at 09:04

## 2023-04-27 RX ADMIN — DONEPEZIL HYDROCHLORIDE 5 MG: 5 TABLET, FILM COATED ORAL at 09:04

## 2023-04-27 RX ADMIN — QUETIAPINE FUMARATE 25 MG: 25 TABLET ORAL at 08:04

## 2023-04-27 RX ADMIN — ENOXAPARIN SODIUM 40 MG: 40 INJECTION SUBCUTANEOUS at 05:04

## 2023-04-27 RX ADMIN — MEMANTINE HYDROCHLORIDE 10 MG: 5 TABLET ORAL at 08:04

## 2023-04-27 RX ADMIN — MEMANTINE HYDROCHLORIDE 10 MG: 5 TABLET ORAL at 09:04

## 2023-04-27 RX ADMIN — PREDNISOLONE SODIUM PHOSPHATE 60 MG: 15 SOLUTION ORAL at 09:04

## 2023-04-27 RX ADMIN — ASPIRIN 81 MG: 81 TABLET, COATED ORAL at 09:04

## 2023-04-27 NOTE — ASSESSMENT & PLAN NOTE
-appreciate palliative Medicine assistance  -plan on discharge to facility, possibly a different SNF as family is not as interested in [facility].  Family reconsidering discharge to hospice.

## 2023-04-27 NOTE — DISCHARGE INSTRUCTIONS
Multiple medication changes outlined in plan of care note to nursing facility.  Subject to change on discharge from facility depending on monitoring of clinical condition, vital signs, labs, and outpatient physician follow-up.       Recommend discontinuing Donepezil. It has known side effect of slow heart rate. A few days after resuming this medication, the heart rate slowed.     Recommend discontinuing amlodipine. This medication has a known side effect of lower extremity swelling. Swelling has improved in the hospital off of this medication. Lower extremity swelling will make it difficult for the leg ulcer to heal.

## 2023-04-27 NOTE — PROGRESS NOTES
"[facility] - Intensive Care ([address] Medicine  Progress Note    Patient Name: Miranda Houston  MRN: [id number]  Patient Class: IP- Inpatient   Admission Date: 4/21/2023  Length of Stay: 5 days  Attending Physician: Andres Dukes III,*  Primary Care Provider: Sabino Calle MD        Subjective:     Principal Problem:CVA (cerebral vascular accident)        HPI:  Per admitting physician:   Ms. Houston is a 72 year old female with PMH notable for advanced dementia (resides at [facility]), HTN, HLD, pyoderma gangrenosum, and chronic rhinitis who [place] [facility] [place] for AMS and "failure to thrive." Per chart review, facility reports poor PO intake and decreased mentation. EMS reports patient was only responsive to painful stimulus.      Initial lab evaluation revealed leukocytosis with WBC 14, stable H/H, sed rate > 120, , Na 162, Cl 119, JOSEFA with BUN/Cr 61/1.8. CT head with no acute intracranial findings. XRAY foot, tib/fib, and ankle without fracture. CXR with small L effusion. In the ED she was given a total of 2.9L IVF and started on broad spectrum abx with infectious work up pending. Toxicology negative.      Critical care consulted for AMS and hypernatremia.           Overview/Hospital Course:  No notes on file    Interval History:  Patient seen and examined with no family at bedside.  Patient is much more awake and interactive today.  She is speaking in short phrases some of which do not make sense.  Patient is smiling and seems in much better spirits today.    Labs ordered:  CBC, BMP, magnesium, phosphorus    Discussed with palliative medicine provider regarding patient's improved mentation.  Patient would likely still meet criteria for hospice given debility, decreased p.o. intake, and poor nutritional status with nonhealing wound.    Discussed with son, Angel, who is an emergency room physician, and it seems the family is leaning towards discharge back to facility, " not on hospice.  They are requesting a different facilities to Case Management has been contacted.    Since family is reconsidering hospice, I have resumed glucose checks, labs, carotid ultrasound ordered to complete stroke workup, evaluation by PT/OT, and started aspirin and statin.  I did discuss possible vascular neurology consult with Angel but he did not feel that that was necessary.  He agrees with aspirin and statin.    Discussed with Dermatology and patient can follow up as an outpatient for continued treatment of left shin ulcer      Objective:     Vital Signs (Most Recent):  Temp: 98.2 °F (36.8 °C) (04/26/23 2000)  Pulse: 81 (04/26/23 2122)  Resp: 18 (04/26/23 2122)  BP: 124/73 (04/26/23 2000)  SpO2: 100 % (04/26/23 2122) Vital Signs (24h Range):  Temp:  [97.4 °F (36.3 °C)-98.5 °F (36.9 °C)] 98.2 °F (36.8 °C)  Pulse:  [65-98] 81  Resp:  [11-36] 18  SpO2:  [98 %-100 %] 100 %  BP: (121-161)/(69-84) 124/73     Weight: 88 kg (194 lb)  Body mass index is 33.3 kg/m².    Intake/Output Summary (Last 24 hours) at 4/26/2023 2300  Last data filed at 4/26/2023 1648  Gross per 24 hour   Intake 1516.69 ml   Output 1000 ml   Net 516.69 ml        Physical Exam  Vitals reviewed.   Constitutional:       General: She is not in acute distress.     Appearance: She is not ill-appearing.   HENT:      Mouth/Throat:      Mouth: Mucous membranes are dry.   Cardiovascular:      Rate and Rhythm: Normal rate and regular rhythm.   Pulmonary:      Effort: Pulmonary effort is normal. No respiratory distress.   Abdominal:      General: Bowel sounds are normal. There is no distension.      Palpations: Abdomen is soft.      Tenderness: There is no abdominal tenderness.   Musculoskeletal:      Right lower leg: No edema.      Left lower leg: No edema.   Skin:     Comments: LLE anterior shin ulcer with dressing clean, dry, intact.  I unwrapped and surrounding edema seems improved, ulceration overall seems to be improving.   Neurological:       Comments: Oriented to person only.  Tracks me through the room.  Follows commands.  Is smiling and interactive.  Some of her responses no make sense but she seems to be in good spirits               Assessment/Plan:      * CVA (cerebral vascular accident)  Acute encephalopathy on Chronic Dementia  Dehydration  Hypernatremia/hyperchloremia  -continue hypotonic IV fluids, dehydration and electrolyte abnormalities improving.  -on better examination of lower extremity ulcer, does not appear to be infected.  -family says cognitive decline has been rapid over the last few weeks.  Concern for other neurologic process.  MRI brain concerning for recent/subacute left parietal stroke.  -discussed with the patient's son, Angel, who initially planned on transitioning to hospice.  Initially  Angel declined any therapy including PT/OT/ST and declined medications as they would have to be administered rectally.  -started on aspirin and statin.  -4/26, patient much more alert and interactive.  Family is reconsidering hospice.  Discussed with Angel and for now we will resume glucose checks and labs.  Will complete stroke workup with carotid ultrasound.  Will order PT/OT.  The patient been seen by ST and cleared for regular diet with thin liquids.  -started on aspirin and statin and discussed with Angel who agrees.    Pyoderma gangrenosum  -already appears to be improving with steroids.  -wound care consulted  -I called orthopedic surgery who reviewed the images on 04/23 and is not concern for deeper infection.  -s/p antibiotic  -continue IV steroids and plan to transition to prednisone versus prednisolone      Encounter for palliative care  -appreciate palliative Medicine assistance  -plan on discharge to facility, possibly a different SNF as family is not as interested in [facility].  Family reconsidering discharge to hospice.    JOSEFA (acute kidney injury)  -resolved with IV fluids, likely prerenal    Essential  hypertension  -currently controlled on no medication  -continue to monitor        VTE Risk Mitigation (From admission, onward)         Ordered     IP VTE HIGH RISK PATIENT  Once         04/21/23 2325     Place sequential compression device  Until discontinued         04/21/23 2325                Discharge Planning   EDITA: 4/27/2023     Code Status: DNR   Is the patient medically ready for discharge?: Yes    Reason for patient still in hospital (select all that apply): Patient new problem, Patient trending condition, Laboratory test, Imaging, PT / OT recommendations and Pending disposition  Discharge Plan A: Return to nursing home                  Andres Dukes III, MD  Department of Hospital Medicine   [facility] - Intensive Care ([place]-14)

## 2023-04-27 NOTE — ASSESSMENT & PLAN NOTE
Acute encephalopathy on Chronic Dementia  Dehydration  Hypernatremia/hyperchloremia  -continue hypotonic IV fluids, dehydration and electrolyte abnormalities improving.  -on better examination of lower extremity ulcer, does not appear to be infected.  -family says cognitive decline has been rapid over the last few weeks.  Concern for other neurologic process.  MRI brain concerning for recent/subacute left parietal stroke.  -discussed with the patient's son, Angel, who initially planned on transitioning to hospice.  Initially  Angel declined any therapy including PT/OT/ST and declined medications as they would have to be administered rectally.  -started on aspirin and statin.  -4/26, patient much more alert and interactive.  Family is reconsidering hospice.  Discussed with Angel and for now we will resume glucose checks and labs.  Will complete stroke workup with carotid ultrasound.  Will order PT/OT.  The patient been seen by ST and cleared for regular diet with thin liquids.  -started on aspirin and statin and discussed with Angel who agrees.

## 2023-04-27 NOTE — ASSESSMENT & PLAN NOTE
-appreciate palliative Medicine assistance  -Family decided against hospice. Working on different SNF

## 2023-04-27 NOTE — SUBJECTIVE & OBJECTIVE
"Interval History: Pt resting in bed on my arrival. Eyes open and pt smile and said "Hi, good morning" and "How are you". Much of the rest of the conversation did not make sense but she was very interactive.    Labs reviewed: WBC elev to 14.45 but likely due to steroids, K low at 3.4 and replaced, Cr continues to improve.     Discussed with son, Angel, and family have decided to no longer proceed with hospice. They are looking to discharge to a different SNF.     Discussed again with dermatology and they will schedule her for an appointment for further recs regarding pyoderma gangrenosum.       Objective:     Vital Signs (Most Recent):  Temp: 97.9 °F (36.6 °C) (04/27/23 1215)  Pulse: 75 (04/27/23 1451)  Resp: 18 (04/27/23 0830)  BP: (!) 154/93 (04/27/23 1215)  SpO2: 100 % (04/27/23 1451) Vital Signs (24h Range):  Temp:  [97.5 °F (36.4 °C)-98.5 °F (36.9 °C)] 97.9 °F (36.6 °C)  Pulse:  [67-98] 75  Resp:  [18] 18  SpO2:  [100 %] 100 %  BP: (114-154)/(68-99) 154/93     Weight: 88 kg (194 lb)  Body mass index is 33.3 kg/m².    Intake/Output Summary (Last 24 hours) at 4/27/2023 1600  Last data filed at 4/27/2023 0623  Gross per 24 hour   Intake 743.79 ml   Output 500 ml   Net 243.79 ml        Physical Exam  Vitals reviewed.   Constitutional:       General: She is not in acute distress.     Appearance: She is not ill-appearing.   HENT:      Mouth/Throat:      Mouth: Mucous membranes are dry.   Cardiovascular:      Rate and Rhythm: Normal rate and regular rhythm.   Pulmonary:      Effort: Pulmonary effort is normal. No respiratory distress.   Abdominal:      General: Bowel sounds are normal. There is no distension.      Palpations: Abdomen is soft.      Tenderness: There is no abdominal tenderness.   Musculoskeletal:      Right lower leg: No edema.      Left lower leg: No edema.   Skin:     Comments: LLE anterior shin ulcer with dressing clean, dry, intact.  I unwrapped and surrounding edema seems improved, ulceration " overall seems to be improving.   Neurological:      Comments: Oriented to person only.  Tracks me through the room.  Follows commands.  Is smiling and interactive.  Some of her responses do not make sense but she seems to be in good spirits

## 2023-04-27 NOTE — ASSESSMENT & PLAN NOTE
-already appears to be improving with steroids.  -wound care consulted  -I called orthopedic surgery who reviewed the images on 04/23 and is not concern for deeper infection.  -s/p antibiotic  -continue IV steroids and plan to transition to prednisone versus prednisolone

## 2023-04-27 NOTE — NURSING
Patient alert with confusion. No s/s of  pain or discomfort notes. No s/s of respiratory distress noted. Patient slept well. Safety measures maintain. Bed in lowest position and call light within reach.

## 2023-04-27 NOTE — ASSESSMENT & PLAN NOTE
Acute encephalopathy on Chronic Dementia  Dehydration  Hypernatremia/hyperchloremia  -continue hypotonic IV fluids, dehydration and electrolyte abnormalities improving.  -on better examination of lower extremity ulcer, does not appear to be infected.  -family says cognitive decline has been rapid over the last few weeks.  Concern for other neurologic process.  MRI brain concerning for recent/subacute left parietal stroke.  -discussed with the patient's son, Angel, who initially planned on transitioning to hospice.  Initially  Angel declined any therapy including PT/OT/ST and declined medications as they would have to be administered rectally.  -4/26, patient much more alert and interactive. The patient been seen by ST and cleared for regular diet with thin liquids. Family decided to discharge to new SNF and do not want hospice currently. Case mgmt aware  -started on aspirin and statin and discussed with Angel who agrees.

## 2023-04-27 NOTE — PLAN OF CARE
PT Evaluation completed and PT POC established.    Problem: Physical Therapy  Goal: Physical Therapy Goal  Description: Pt is at functional baseline with no acute needs. PT to sign off. Please reconsult if status changes.  Outcome: Met

## 2023-04-27 NOTE — PLAN OF CARE
OT evaluation completed. Patient is presenting at functional baseline and is appropriate for discharge from acute skilled OT services. Patient is dependent requiring assistance for all ADLs and mobility tasks as tolerated.    Problem: Occupational Therapy  Goal: Occupational Therapy Goal  Outcome: Met

## 2023-04-27 NOTE — PT/OT/SLP EVAL
"Occupational Therapy  Co- Evaluation/Treatment and Discharge Note  Co-treat with PT due to medical complexity of pt and to optimize functional performance.      Name: Miranda Houston  MRN: [id number]  Admitting Diagnosis: CVA (cerebral vascular accident)  Recent Surgery: * No surgery found *      Recommendations:     Discharge Recommendations: nursing facility, basic (return)  Discharge Equipment Recommendations: none  Barriers to discharge:  None    Assessment:     Miranda Houston is a 72 y.o. female with a medical diagnosis of CVA (cerebral vascular accident). At this time, patient is functioning at their prior level of function and does not require further acute OT services.     Patient AxOx 1, self only, with inability to state name or . Patient required Total A for bed mobility. Throughout session, patient with verbal responses unrelated to activity or situation. Patient demonstrated no ability to follow commands at this time. Patient noted with increased BUE and BLE tone and decreased ROM.   Plan:     During this hospitalization, patient does not require further acute OT services.  Please re-consult if situation changes.    Plan of Care Reviewed with: patient    Subjective   "You're talking about your ?"    Chief Complaint: none identified  Patient/Family Comments/goals: to return home at [facility]    Occupational Profile:    Patient is a poor historian, per chart review, resident of [facility]. Unable to determine prior level of function but based on presentation has been dependent for some time for all ADLs.  Equipment Used at home: none/unknown  Assistance upon Discharge: facility staff    Pain/Comfort:  Pain Rating 1: 0/10 (no visual/auditory cues)  Pain Rating Post-Intervention 1: 0/10 (no visual/auditory cues)    Patients cultural, spiritual, Bahai conflicts given the current situation: no    Objective:     Communicated with: Nurse prior to session.  Patient found left sidelying " wedged with pillows with telemetry, oxygen, PureWick, peripheral IV, pulse ox (continuous) upon OT entry to room.    General Precautions: Standard, aspiration, fall  Orthopedic Precautions: N/A  Braces: N/A  Respiratory Status: Nasal cannula, flow 2 L/min     Occupational Performance:    Bed Mobility:    Patient completed Rolling/Turning to Left with  total assistance and 2 persons  Patient completed Rolling/Turning to Right with total assistance and 2 persons  Patient completed Scooting/Bridging with total assistance and 2 persons  Patient completed Supine to Sit with total assistance and 2 persons  Patient completed Sit to Supine with total assistance and 2 persons    Functional Mobility/Transfers:  Patient completed Sit <> Stand Transfer with total assistance and of 2 persons  with  hand-held assist  x 2 trials, limited hip clearance and unable to tolerate static stance for prolonged period despite support      Activities of Daily Living:  Grooming: maximal assistance to wash face seated EOB with RUE hand over hand assist  Upper Body Dressing: maximal assistance to doff/don clean gown  Lower Body Dressing: maximal assistance to don/doff socks    Cognitive/Visual Perceptual:  Cognitive/Psychosocial Skills:     -       Oriented to: Person and unable to state, responds to name being called   -       Follows Commands/attention:Inattentive  -       Communication: clear/fluent; unrelated to situation  -       Memory: Impaired STM, Impaired LTM, and Poor immediate recall  -       Safety awareness/insight to disability: impaired   -       Mood/Affect/Coping skills/emotional control: Appropriate to situation  Visual/Perceptual:      -Intact     Physical Exam:  Balance:    -       Fair static sitting EOB  Upper Extremity Range of Motion:     -       Right Upper Extremity: PROM WFL, increased tone; requiring increased time for full ROM  -       Left Upper Extremity: PROM WFL, increased tone; requiring increased time for  full ROM  Upper Extremity Strength:    -       Right Upper Extremity: ROBE, no command following  -       Left Upper Extremity: ROBE, no command following   Strength:    -       Right Upper Extremity: 3/5  -       Left Upper Extremity: 3/5  Fine Motor Coordination:    -       Impaired    Gross motor coordination:   impaired    AMPAC 6 Click ADL:  AMPAC Total Score: 7    Treatment & Education:  -Pt education on OT role and POC.  -Importance of E/OOB activity with staff assistance, emphasis on daily participation  -Safety during functional transfer and mobility ensured  -Patient provided with education on importance of Bilateral UB/LB integration during functional tasks for improvement in functional performance.   -Education provided/reviewed, questions answered within OT scope of practice.   -Patient will continue to require reinforcement to demonstrate understanding and learning this date.         Patient left left sidelying wedged with pillows with all lines intact, call button in reach, and nurse notified    GOALS:   Multidisciplinary Problems       Occupational Therapy Goals       Not on file              Multidisciplinary Problems (Resolved)          Problem: Occupational Therapy    Goal Priority Disciplines Outcome Interventions   Occupational Therapy Goal   (Resolved)     OT, PT/OT Met                        History:     Past Medical History:   Diagnosis Date    Chronic rhinitis     Colon polyps     Hyperlipidemia     Hypertension     Pyoderma gangrenosum     left leg    Steroid-induced diabetes mellitus          Past Surgical History:   Procedure Laterality Date     SECTION      2 surgeries.       Time Tracking:     OT Date of Treatment: 23  OT Start Time: 0950  OT Stop Time: 1008  OT Total Time (min): 18 min    Billable Minutes:Evaluation 9  Self Care/Home Management 9    2023

## 2023-04-27 NOTE — PT/OT/SLP EVAL
Physical Therapy Evaluation and Discharge Note    Patient Name:  Miranda Houston   MRN:  [id number]    Recommendations:     Discharge Recommendations: other (see comments) (return to NH)  Discharge Equipment Recommendations: none   Barriers to discharge: None    Assessment:     Miranda Houston is a 72 y.o. female admitted with a medical diagnosis of CVA (cerebral vascular accident). .  At this time, patient is functioning at their prior level of function and does not require further acute PT services.     Recent Surgery: * No surgery found *      Plan:     During this hospitalization, patient does not require further acute PT services.  Please re-consult if situation changes.      Subjective     Limited verbal responses    Pain/Comfort:  Pain Rating 1: 0/10  Pain Rating Post-Intervention 1: 0/10    Patients cultural, spiritual, Bahai conflicts given the current situation: no    Living Environment:  Pt is poor historian. Per EMR, Pt is a resident at [facility].  Based on presentation, pt has been dependent with all mobility.  Equipment used at home: none.  DME owned (not currently used): none.  Upon discharge, patient will have assistance from NH staff.    Objective:     Communicated with RN prior to session.  Patient found HOB elevated with telemetry, oxygen, PureWick, peripheral IV, pulse ox (continuous) upon PT entry to room.    General Precautions: Standard, aspiration    Orthopedic Precautions:N/A   Braces: N/A  Respiratory Status: Nasal cannula, flow 2 L/min    Exams:  Cognitive Exam:  Patient is oriented to AAOx0, not following commands  RLE ROM: decreased flexibility  RLE Strength: minimal spontaneous movements    Functional Mobility:  Bed Mobility:     Rolling Left:  total assistance and of 2 persons  Rolling Right: total assistance and of 2 persons  Supine to Sit: total assistance and of 2 persons  Sit to Supine: total assistance and of 2 persons  Transfers:     Sit to Stand:  total  assistance and of 2 persons with no AD. Poor hip clearance  Balance:   Fair sitting balance SBA  Poor standing balance    AM-PAC 6 CLICK MOBILITY  Total Score:6       Treatment and Education:  Educated pt on PT role/POC  PT facilitation and cueing throughout session     Pt is at functional baseline with no acute needs. PT to sign off. Please reconsult if status changes.      AM-PAC 6 CLICK MOBILITY  Total Score:6     Patient left HOB elevated with all lines intact, call button in reach, RN notified, and OT present.    GOALS:   Multidisciplinary Problems       Physical Therapy Goals       Not on file              Multidisciplinary Problems (Resolved)          Problem: Physical Therapy    Goal Priority Disciplines Outcome Goal Variances Interventions   Physical Therapy Goal   (Resolved)     PT, PT/OT Met     Description: Pt is at functional baseline with no acute needs. PT to sign off. Please reconsult if status changes.                       History:     Past Medical History:   Diagnosis Date    Chronic rhinitis     Colon polyps     Hyperlipidemia     Hypertension     Pyoderma gangrenosum     left leg    Steroid-induced diabetes mellitus        Past Surgical History:   Procedure Laterality Date     SECTION      2 surgeries.       Time Tracking:     PT Received On: 23  PT Start Time: 0950     PT Stop Time: 1008  PT Total Time (min): 18 min     Billable Minutes: Evaluation 9 and Neuromuscular Re-education 9    Co-treatment performed due to patient's multiple deficits requiring two skilled therapists to appropriately and safely assess patient's strength and endurance while facilitating functional tasks in addition to accommodating for patient's activity tolerance.       2023

## 2023-04-27 NOTE — PROGRESS NOTES
"[facility] - Intensive Care ([address] Medicine  Progress Note    Patient Name: Miranda Houston  MRN: [id number]  Patient Class: IP- Inpatient   Admission Date: 4/21/2023  Length of Stay: 6 days  Attending Physician: Andres Dukes III,*  Primary Care Provider: Sabino Calle MD        Subjective:     Principal Problem:CVA (cerebral vascular accident)        HPI:  Per admitting physician:   Ms. Houston is a 72 year old female with PMH notable for advanced dementia (resides at [facility]), HTN, HLD, pyoderma gangrenosum, and chronic rhinitis who presented to [facility] via EMS for AMS and "failure to thrive." Per chart review, facility reports poor PO intake and decreased mentation. EMS reports patient was only responsive to painful stimulus.      Initial lab evaluation revealed leukocytosis with WBC 14, stable H/H, sed rate > 120, , Na 162, Cl 119, JOSEFA with BUN/Cr 61/1.8. CT head with no acute intracranial findings. XRAY foot, tib/fib, and ankle without fracture. CXR with small L effusion. In the ED she was given a total of 2.9L IVF and started on broad spectrum abx with infectious work up pending. Toxicology negative.      Critical care consulted for AMS and hypernatremia.           Overview/Hospital Course:  No notes on file    Interval History: Pt resting in bed on my arrival. Eyes open and pt smile and said "Hi, good morning" and "How are you". Much of the rest of the conversation did not make sense but she was very interactive.    Labs reviewed: WBC elev to 14.45 but likely due to steroids, K low at 3.4 and replaced, Cr continues to improve.     Discussed with son, Angel, and family have decided to no longer proceed with hospice. They are looking to discharge to a different SNF.     Discussed again with dermatology and they will schedule her for an appointment for further recs regarding pyoderma gangrenosum.       Objective:     Vital Signs (Most Recent):  Temp: 97.9 °F (36.6 °C) " (04/27/23 1215)  Pulse: 75 (04/27/23 1451)  Resp: 18 (04/27/23 0830)  BP: (!) 154/93 (04/27/23 1215)  SpO2: 100 % (04/27/23 1451) Vital Signs (24h Range):  Temp:  [97.5 °F (36.4 °C)-98.5 °F (36.9 °C)] 97.9 °F (36.6 °C)  Pulse:  [67-98] 75  Resp:  [18] 18  SpO2:  [100 %] 100 %  BP: (114-154)/(68-99) 154/93     Weight: 88 kg (194 lb)  Body mass index is 33.3 kg/m².    Intake/Output Summary (Last 24 hours) at 4/27/2023 1600  Last data filed at 4/27/2023 0623  Gross per 24 hour   Intake 743.79 ml   Output 500 ml   Net 243.79 ml        Physical Exam  Vitals reviewed.   Constitutional:       General: She is not in acute distress.     Appearance: She is not ill-appearing.   HENT:      Mouth/Throat:      Mouth: Mucous membranes are dry.   Cardiovascular:      Rate and Rhythm: Normal rate and regular rhythm.   Pulmonary:      Effort: Pulmonary effort is normal. No respiratory distress.   Abdominal:      General: Bowel sounds are normal. There is no distension.      Palpations: Abdomen is soft.      Tenderness: There is no abdominal tenderness.   Musculoskeletal:      Right lower leg: No edema.      Left lower leg: No edema.   Skin:     Comments: LLE anterior shin ulcer with dressing clean, dry, intact.  I unwrapped and surrounding edema seems improved, ulceration overall seems to be improving.   Neurological:      Comments: Oriented to person only.  Tracks me through the room.  Follows commands.  Is smiling and interactive.  Some of her responses do not make sense but she seems to be in good spirits                 Assessment/Plan:      * CVA (cerebral vascular accident)  Acute encephalopathy on Chronic Dementia  Dehydration  Hypernatremia/hyperchloremia  -continue hypotonic IV fluids, dehydration and electrolyte abnormalities improving.  -on better examination of lower extremity ulcer, does not appear to be infected.  -family says cognitive decline has been rapid over the last few weeks.  Concern for other neurologic  process.  MRI brain concerning for recent/subacute left parietal stroke.  -discussed with the patient's son, Angel, who initially planned on transitioning to hospice.  Initially  Angel declined any therapy including PT/OT/ST and declined medications as they would have to be administered rectally.  -4/26, patient much more alert and interactive. The patient been seen by ST and cleared for regular diet with thin liquids. Family decided to discharge to new SNF and do not want hospice currently. Case mgmt aware  -started on aspirin and statin and discussed with Angel who agrees.    Pyoderma gangrenosum  -already appears to be improving with steroids.  -wound care consulted  -I called orthopedic surgery who reviewed the images on 04/23 and is not concern for deeper infection.  -s/p antibiotic  -continue IV steroids and plan to transition to prednisone versus prednisolone      Encounter for palliative care  -appreciate palliative Medicine assistance  -Family decided against hospice. Working on different SNF    JOSEFA (acute kidney injury)  -resolved with IV fluids, likely prerenal    Essential hypertension  -currently controlled on no medication  -continue to monitor        VTE Risk Mitigation (From admission, onward)         Ordered     enoxaparin injection 40 mg  Daily         04/27/23 0538     IP VTE HIGH RISK PATIENT  Once         04/21/23 2325     Place sequential compression device  Until discontinued         04/21/23 2325                Discharge Planning   EDITA: 4/28/2023     Code Status: DNR   Is the patient medically ready for discharge?: Yes    Reason for patient still in hospital (select all that apply): Patient trending condition, Laboratory test and Pending disposition  Discharge Plan A: Return to nursing home                  Andres Dukes III, MD  Department of Hospital Medicine   [facility] - Intensive Care ([facility]

## 2023-04-27 NOTE — SUBJECTIVE & OBJECTIVE
Interval History:  Patient seen and examined with no family at bedside.  Patient is much more awake and interactive today.  She is speaking in short phrases some of which do not make sense.  Patient is smiling and seems in much better spirits today.    Labs ordered:  CBC, BMP, magnesium, phosphorus    Discussed with palliative medicine provider regarding patient's improved mentation.  Patient would likely still meet criteria for hospice given debility, decreased p.o. intake, and poor nutritional status with nonhealing wound.    Discussed with son, Angel, who is an emergency room physician, and it seems the family is leaning towards discharge back to facility, not on hospice.  They are requesting a different facilities to Case Management has been contacted.    Since family is reconsidering hospice, I have resumed glucose checks, labs, carotid ultrasound ordered to complete stroke workup, evaluation by PT/OT, and started aspirin and statin.  I did discuss possible vascular neurology consult with Angel but he did not feel that that was necessary.  He agrees with aspirin and statin.    Discussed with Dermatology and patient can follow up as an outpatient for continued treatment of left shin ulcer      Objective:     Vital Signs (Most Recent):  Temp: 98.2 °F (36.8 °C) (04/26/23 2000)  Pulse: 81 (04/26/23 2122)  Resp: 18 (04/26/23 2122)  BP: 124/73 (04/26/23 2000)  SpO2: 100 % (04/26/23 2122) Vital Signs (24h Range):  Temp:  [97.4 °F (36.3 °C)-98.5 °F (36.9 °C)] 98.2 °F (36.8 °C)  Pulse:  [65-98] 81  Resp:  [11-36] 18  SpO2:  [98 %-100 %] 100 %  BP: (121-161)/(69-84) 124/73     Weight: 88 kg (194 lb)  Body mass index is 33.3 kg/m².    Intake/Output Summary (Last 24 hours) at 4/26/2023 2300  Last data filed at 4/26/2023 1648  Gross per 24 hour   Intake 1516.69 ml   Output 1000 ml   Net 516.69 ml        Physical Exam  Vitals reviewed.   Constitutional:       General: She is not in acute distress.     Appearance: She is not  ill-appearing.   HENT:      Mouth/Throat:      Mouth: Mucous membranes are dry.   Cardiovascular:      Rate and Rhythm: Normal rate and regular rhythm.   Pulmonary:      Effort: Pulmonary effort is normal. No respiratory distress.   Abdominal:      General: Bowel sounds are normal. There is no distension.      Palpations: Abdomen is soft.      Tenderness: There is no abdominal tenderness.   Musculoskeletal:      Right lower leg: No edema.      Left lower leg: No edema.   Skin:     Comments: LLE anterior shin ulcer with dressing clean, dry, intact.  I unwrapped and surrounding edema seems improved, ulceration overall seems to be improving.   Neurological:      Comments: Oriented to person only.  Tracks me through the room.  Follows commands.  Is smiling and interactive.  Some of her responses no make sense but she seems to be in good spirits

## 2023-04-27 NOTE — PLAN OF CARE
Problem: Adult Inpatient Plan of Care  Goal: Plan of Care Review  Outcome: Ongoing, Progressing  Goal: Patient-Specific Goal (Individualized)  Outcome: Ongoing, Progressing  Goal: Absence of Hospital-Acquired Illness or Injury  Outcome: Ongoing, Progressing  Goal: Optimal Comfort and Wellbeing  Outcome: Ongoing, Progressing  Goal: Readiness for Transition of Care  Outcome: Ongoing, Progressing     Problem: Impaired Wound Healing  Goal: Optimal Wound Healing  Outcome: Ongoing, Progressing     Problem: Skin Injury Risk Increased  Goal: Skin Health and Integrity  Outcome: Ongoing, Progressing     Problem: Fall Injury Risk  Goal: Absence of Fall and Fall-Related Injury  Outcome: Ongoing, Progressing     Problem: Infection  Goal: Absence of Infection Signs and Symptoms  Outcome: Ongoing, Progressing     Problem: Fluid and Electrolyte Imbalance (Acute Kidney Injury/Impairment)  Goal: Fluid and Electrolyte Balance  Outcome: Ongoing, Progressing     Problem: Oral Intake Inadequate (Acute Kidney Injury/Impairment)  Goal: Optimal Nutrition Intake  Outcome: Ongoing, Progressing     Problem: Renal Function Impairment (Acute Kidney Injury/Impairment)  Goal: Effective Renal Function  Outcome: Ongoing, Progressing     Problem: Coping Ineffective  Goal: Effective Coping  Outcome: Ongoing, Progressing

## 2023-04-27 NOTE — PLAN OF CARE
NURSING HOME ORDERS    04/28/2023  [facility]  [facility] - INTENSIVE CARE ([place])  [address]  [facility] [address]  Dept: [phone]  Loc: [phone]     Admit to Nursing Home:  Skilled Nursing Facility    Diagnoses:  Active Hospital Problems    Diagnosis  POA    *CVA (cerebral vascular accident) [I63.9]  Yes     Priority: 1 - High    Pyoderma gangrenosum [L88]  Yes     Priority: 2     Encounter for palliative care [Z51.5]  Not Applicable    Counseling regarding advanced care planning and goals of care [Z71.89]  Not Applicable    JOSEFA (acute kidney injury) [N17.9]  Yes    Bacteremia [R78.81]  Yes    Hypernatremia [E87.0]  Yes    Dementia [F03.90]  Yes    Essential hypertension [I10]  Yes     Chronic      Resolved Hospital Problems   No resolved problems to display.       Patient is homebound due to:  CVA (cerebral vascular accident)    Allergies:  Review of patient's allergies indicates:   Allergen Reactions    No known drug allergies        Vitals:  Routine    Diet: cardiac diet and diabetic diet: 2000 calorie    Activities:   Activity as tolerated    Goals of Care Treatment Preferences:  Code Status: DNR          What is most important right now is to focus on comfort and QOL .  Accordingly, we have decided that the best plan to meet the patient's goals includes enrolling in hospice care.      Labs:  BMP, CBC, magnesium, phosphorus weekly on Mondays for 4 occurrences to be followed up by PCP.  Monitoring electrolytes and kidney function the to decreased p.o. intake with dementia.    Nursing Precautions:  Aspiration , Fall, and Pressure ulcer prevention    Consults:   PT to evaluate and treat- 3 times a week, OT to evaluate and treat- 3 times a week, ST to evaluate and treat- 3 times a week, Wound Care, and Nutrition to evaluate and recommend diet     Miscellaneous Care: Routine Skin for Bedridden Patients:  Apply moisture barrier cream to all    Wound Care: Recommend  continuing current outpatient wound care orders:  Clean with vashe.  Apply garfield collegen and cover with Ag (silver) foam and secure with non compressing ace wrap. Perform 3 xs weekly.  Patient will have dermatology appointment coming up.      Diabetes Care: Diabetes: Check blood sugar. Fingerstick blood sugar AC and HS                   Diabetes Care:  SN to perform and educate Diabetic management with blood glucose monitoring:      Medications: Discontinue all previous medication orders, if any. See new list below.     Medication List        START taking these medications      aspirin 81 MG EC tablet  Commonly known as: ECOTRIN  Take 1 tablet (81 mg total) by mouth once daily.     atorvastatin 40 MG tablet  Commonly known as: LIPITOR  Take 1 tablet (40 mg total) by mouth once daily.     prednisoLONE 15 mg/5 mL (3 mg/mL) solution  Commonly known as: ORAPRED  Take 20 mLs (60 mg total) by mouth once daily. 60 mg x 7 total days, last day 5/3  40 mg x 7 total days, last day 5/10  20 mg x 7 total days, last day 5/17  10 mg of 7 total days, last day 5/24            CONTINUE taking these medications      econazole nitrate 1 % cream  Apply topically once daily.     hydrocortisone 2.5 % cream  Apply topically 3 (three) times daily.     memantine 10 MG Tab  Commonly known as: NAMENDA  Take 1 tablet (10 mg total) by mouth 2 (two) times daily. For memory     mupirocin 2 % ointment  Commonly known as: BACTROBAN  Apply topically 2 (two) times daily.     QUEtiapine 25 MG Tab  Commonly known as: SEROQUEL  Take 1 tablet (25 mg total) by mouth nightly.            STOP taking these medications      amLODIPine 5 MG tablet  Commonly known as: NORVASC     donepeziL 5 MG tablet  Commonly known as: ARICEPT     haloperidoL 0.5 MG tablet  Commonly known as: HALDOL     losartan 50 MG tablet  Commonly known as: COZAAR            MEDICATION CLARIFICATION  -amlodipine 5 mg q.d. and losartan 50 mg q.d. held during admission because blood  pressure was acceptable with no medications.  If blood pressure consistently greater than 140/90, recommend starting Losartan and titrating.  -I would recommend not continuing amlodipine. It may have been causing lower extremity edema and making wound healing more difficult.  -Donepezil was restarted in the hospital once patient became alert enough to tolerate diet. After a few days of meds, she developed sinus bradycardia which is a known side effect of this medication. Recommend discontinuing and close monitoring of heart rate. BP remained stable.     Immunizations Administered as of 4/28/2023       No immunizations on file.            This patient has had 1 of the Moderna COVID vaccinations    Some patients may experience side effects after vaccination.  These may include fever, headache, muscle or joint aches.  Most symptoms resolve with 24-48 hours and do not require urgent medical evaluation unless they persist for more than 72 hours or symptoms are concerning for an unrelated medical condition.          _________________________________  Andres Dukes III, MD  04/28/2023

## 2023-04-28 VITALS
OXYGEN SATURATION: 100 % | TEMPERATURE: 98 F | HEART RATE: 94 BPM | HEIGHT: 64 IN | BODY MASS INDEX: 33.12 KG/M2 | SYSTOLIC BLOOD PRESSURE: 142 MMHG | DIASTOLIC BLOOD PRESSURE: 80 MMHG | RESPIRATION RATE: 18 BRPM | WEIGHT: 194 LBS

## 2023-04-28 LAB
ANION GAP SERPL CALC-SCNC: 8 MMOL/L (ref 8–16)
BASOPHILS # BLD AUTO: 0.04 K/UL (ref 0–0.2)
BASOPHILS NFR BLD: 0.3 % (ref 0–1.9)
BUN SERPL-MCNC: 13 MG/DL (ref 8–23)
CALCIUM SERPL-MCNC: 8.7 MG/DL (ref 8.7–10.5)
CHLORIDE SERPL-SCNC: 108 MMOL/L (ref 95–110)
CO2 SERPL-SCNC: 26 MMOL/L (ref 23–29)
CREAT SERPL-MCNC: 0.9 MG/DL (ref 0.5–1.4)
DIFFERENTIAL METHOD: ABNORMAL
EOSINOPHIL # BLD AUTO: 0 K/UL (ref 0–0.5)
EOSINOPHIL NFR BLD: 0 % (ref 0–8)
ERYTHROCYTE [DISTWIDTH] IN BLOOD BY AUTOMATED COUNT: 13.6 % (ref 11.5–14.5)
EST. GFR  (NO RACE VARIABLE): >60 ML/MIN/1.73 M^2
GLUCOSE SERPL-MCNC: 114 MG/DL (ref 70–110)
HCT VFR BLD AUTO: 37.1 % (ref 37–48.5)
HGB BLD-MCNC: 11.1 G/DL (ref 12–16)
IMM GRANULOCYTES # BLD AUTO: 0.35 K/UL (ref 0–0.04)
IMM GRANULOCYTES NFR BLD AUTO: 2.2 % (ref 0–0.5)
LYMPHOCYTES # BLD AUTO: 2.6 K/UL (ref 1–4.8)
LYMPHOCYTES NFR BLD: 16.4 % (ref 18–48)
MAGNESIUM SERPL-MCNC: 2.3 MG/DL (ref 1.6–2.6)
MCH RBC QN AUTO: 28.5 PG (ref 27–31)
MCHC RBC AUTO-ENTMCNC: 29.9 G/DL (ref 32–36)
MCV RBC AUTO: 95 FL (ref 82–98)
MONOCYTES # BLD AUTO: 0.8 K/UL (ref 0.3–1)
MONOCYTES NFR BLD: 5 % (ref 4–15)
NEUTROPHILS # BLD AUTO: 12.2 K/UL (ref 1.8–7.7)
NEUTROPHILS NFR BLD: 76.1 % (ref 38–73)
NRBC BLD-RTO: 0 /100 WBC
PHOSPHATE SERPL-MCNC: 3 MG/DL (ref 2.7–4.5)
PLATELET # BLD AUTO: 464 K/UL (ref 150–450)
PMV BLD AUTO: 11.1 FL (ref 9.2–12.9)
POCT GLUCOSE: 103 MG/DL (ref 70–110)
POCT GLUCOSE: 122 MG/DL (ref 70–110)
POCT GLUCOSE: 222 MG/DL (ref 70–110)
POTASSIUM SERPL-SCNC: 3.9 MMOL/L (ref 3.5–5.1)
RBC # BLD AUTO: 3.89 M/UL (ref 4–5.4)
SODIUM SERPL-SCNC: 142 MMOL/L (ref 136–145)
WBC # BLD AUTO: 15.98 K/UL (ref 3.9–12.7)

## 2023-04-28 PROCEDURE — 93010 ELECTROCARDIOGRAM REPORT: CPT | Mod: ,,, | Performed by: INTERNAL MEDICINE

## 2023-04-28 PROCEDURE — 63600175 PHARM REV CODE 636 W HCPCS: Performed by: FAMILY MEDICINE

## 2023-04-28 PROCEDURE — 36415 COLL VENOUS BLD VENIPUNCTURE: CPT | Performed by: FAMILY MEDICINE

## 2023-04-28 PROCEDURE — 83735 ASSAY OF MAGNESIUM: CPT | Performed by: FAMILY MEDICINE

## 2023-04-28 PROCEDURE — 93010 EKG 12-LEAD: ICD-10-PCS | Mod: ,,, | Performed by: INTERNAL MEDICINE

## 2023-04-28 PROCEDURE — 99239 PR HOSPITAL DISCHARGE DAY,>30 MIN: ICD-10-PCS | Mod: ,,, | Performed by: FAMILY MEDICINE

## 2023-04-28 PROCEDURE — 84100 ASSAY OF PHOSPHORUS: CPT | Performed by: FAMILY MEDICINE

## 2023-04-28 PROCEDURE — 99233 PR SUBSEQUENT HOSPITAL CARE,LEVL III: ICD-10-PCS | Mod: ,,, | Performed by: NURSE PRACTITIONER

## 2023-04-28 PROCEDURE — 99239 HOSP IP/OBS DSCHRG MGMT >30: CPT | Mod: ,,, | Performed by: FAMILY MEDICINE

## 2023-04-28 PROCEDURE — 25000003 PHARM REV CODE 250: Performed by: FAMILY MEDICINE

## 2023-04-28 PROCEDURE — 80048 BASIC METABOLIC PNL TOTAL CA: CPT | Performed by: FAMILY MEDICINE

## 2023-04-28 PROCEDURE — 93005 ELECTROCARDIOGRAM TRACING: CPT

## 2023-04-28 PROCEDURE — 99233 SBSQ HOSP IP/OBS HIGH 50: CPT | Mod: ,,, | Performed by: NURSE PRACTITIONER

## 2023-04-28 PROCEDURE — 85025 COMPLETE CBC W/AUTO DIFF WBC: CPT | Performed by: FAMILY MEDICINE

## 2023-04-28 RX ADMIN — ASPIRIN 81 MG: 81 TABLET, COATED ORAL at 09:04

## 2023-04-28 RX ADMIN — ENOXAPARIN SODIUM 40 MG: 40 INJECTION SUBCUTANEOUS at 05:04

## 2023-04-28 RX ADMIN — QUETIAPINE FUMARATE 25 MG: 25 TABLET ORAL at 08:04

## 2023-04-28 RX ADMIN — MEMANTINE HYDROCHLORIDE 10 MG: 5 TABLET ORAL at 08:04

## 2023-04-28 RX ADMIN — MEMANTINE HYDROCHLORIDE 10 MG: 5 TABLET ORAL at 09:04

## 2023-04-28 RX ADMIN — ATORVASTATIN CALCIUM 40 MG: 40 TABLET, FILM COATED ORAL at 09:04

## 2023-04-28 RX ADMIN — PREDNISOLONE SODIUM PHOSPHATE 60 MG: 15 SOLUTION ORAL at 09:04

## 2023-04-28 NOTE — CARE UPDATE
RAPID RESPONSE NURSE PROACTIVE ROUNDING NOTE       Time of Visit: 17:15    Admit Date: 2023  LOS: 7  Code Status: DNR   Date of Visit: 2023  : 1950  Age: 72 y.o.  Sex: female  Race: Black or   Bed: 13846/31648 A:   MRN: [id number]  Was the patient discharged from an ICU this admission? No   Was the patient discharged from a PACU within last 24 hours? No   Did the patient receive conscious sedation/general anesthesia in last 24 hours? No  Was the patient in the ED within the past 24 hours? No  Was the patient on NIPPV within the past 24 hours? No   Attending Physician: Andres Dukes III,*  Primary Service: [facility] HOSP MED B   Time spent at the bedside: < 15 min    SITUATION    Notified by telemetry via phone call.  Reason for alert: pulse ox reading in 60s   Called to evaluate the patient for Respiratory    BACKGROUND     Why is the patient in the hospital?: CVA (cerebral vascular accident)    Patient has a past medical history of Chronic rhinitis, Colon polyps, Hyperlipidemia, Hypertension, Pyoderma gangrenosum, and Steroid-induced diabetes mellitus.    Last Vitals:  Temp: 97.7 °F (36.5 °C) ( 0754)  Pulse: 52 ( 1535)  Resp: 19 ( 0754)  BP: 142/80 ( 0754)  SpO2: 99 % ( 1535)    24 Hours Vitals Range:  Temp:  [97.3 °F (36.3 °C)-98.4 °F (36.9 °C)]   Pulse:  [45-87]   Resp:  [16-19]   BP: (106-180)/(53-86)   SpO2:  [95 %-100 %]     Labs:  Recent Labs     23  0517 23  0308   WBC 14.45* 15.98*   HGB 10.6* 11.1*   HCT 34.4* 37.1    464*       Recent Labs     23  0517 23  0308    142   K 3.4* 3.9    108   CO2 23 26   CREATININE 0.8 0.9   GLU 78 114*   PHOS 2.6* 3.0   MG 2.2 2.3      ASSESSMENT    Called by telemetry to assess patient as her pulse ox was reading in the 60s on telemetry monitor.   Arrived to bedside, patient awake and alert. Baseline confusion 2/2 dementia. Son at bedside.   Pulse ox noted to be off, changed  out. SpO2 100% on 2L NC.   VSS.     INTERVENTIONS    The patient was seen for Respiratory problem. Staff concerns included poor pulse ox waveform. The following interventions were performed: changed out pulse ox.    RECOMMENDATIONS    Continue with current plan of care     PROVIDER ESCALATION    Yes/No  No    Orders received and case discussed with NA.    Disposition: Remain in room 97036.    FOLLOW-UP    Bedside RN, Kate  updated on plan of care. Instructed to call the Rapid Response Nurse, Sydney Churchill RN at [phone] for additional questions or concerns.

## 2023-04-28 NOTE — PLAN OF CARE
Patient will be discharging today going to [facility].SW checked in with patient son and he is completing admission paperwork.      Vandana Nguyen LMSW  [facility]   [id number]

## 2023-04-28 NOTE — NURSING
Alert and confused. Son at bedside. No WOB or sign of distress. O2 at 1 liter with sat of 100 %. Report called to [place]. Safety measures in place.

## 2023-04-29 NOTE — DISCHARGE SUMMARY
"[facility] - Intensive Care ([place] [address])  [facility] Medicine  Discharge Summary      Patient Name: Miranda Houston  MRN: [id number]  YOON: [id number]  Patient Class: IP- Inpatient  Admission Date: 4/21/2023  Hospital Length of Stay: 7 days  Discharge Date and Time: No discharge date for patient encounter.  Attending Physician: Andres Dukes III,*   Discharging Provider: Andres Dukes III, MD  Primary Care Provider: Sabino Calle MD  Hospital Medicine Team: [facility] HOSP MED B Andres Dukes III, MD  Primary Care Team: [facility] HOSP MED B    HPI:   Per admitting physician:   Ms. Houston is a 72 year old female with PMH notable for advanced dementia (resides at [facility]), HTN, HLD, pyoderma gangrenosum, and chronic rhinitis who presented to [facility] ED via EMS for AMS and "failure to thrive." Per chart review, facility reports poor PO intake and decreased mentation. EMS reports patient was only responsive to painful stimulus.      Initial lab evaluation revealed leukocytosis with WBC 14, stable H/H, sed rate > 120, , Na 162, Cl 119, JOSEFA with BUN/Cr 61/1.8. CT head with no acute intracranial findings. XRAY foot, tib/fib, and ankle without fracture. CXR with small L effusion. In the ED she was given a total of 2.9L IVF and started on broad spectrum abx with infectious work up pending. Toxicology negative.      Critical care consulted for AMS and hypernatremia.           * No surgery found *      Hospital Course:   Patient admitted initially to MICU for altered mentation, dehydration, and severe electrolyte abnormalities including hypernatremia, hyperchloremia.  Hypotonic IV fluids were started and sodium was corrected at a deliberate pace.  Patient was stepped down to Hospital Medicine.  I called the patient's son's Susan for collateral information.  Family informed me that cognitive decline was rather rapid over the last several weeks.  Patient reportedly had a fall with an orbit fracture " recently.  Patient recently less interactive with decreased p.o. intake.  The rather rapid onset of of the symptoms concerning for other neurologic process.  MRI brain concerning for recent to subacute left parietal stroke.  I discussed with patient's son, Angel, who is an emergency room physician.  At that time, patient was minimally alert, not talking, and unable to take anything p.o..  Decision at that time was made to forego any treatment especially antiplatelets that would require rectal administration.    On initial presentation, there was concern for infection as patient has a left lower extremity chronic ulcer that has proven difficult to heal.  Family says the patient has been dealing with this lesion for over 20 years.  Reportedly it has been diagnosed as pyoderma gangrenosum.  The left lower extremity was not convincing for infection so antibiotics were discontinued and steroids started as it was felt this may provide patient some relief.  Lesion rapidly improved with IV steroids.  I contacted Dermatology who said they can see the patient in the office and I provided business cards to Chidi.  On admission, patient's bilateral lower extremities were significantly edematous.  None of the patient's medications were started, including amlodipine.  This medication is associated with bilateral lower extremity edema.  Recommend patient hold this medication because lower extremity edema may further complicate healing of this wound.  Discussed at length with Angel    On 04/26, patient was much more alert and interactive.  ST was consulted and cleared patient for regular diet with thin liquids.  Once patient was able to tolerate p.o., the family no longer wanted to proceed with hospice.  Aspirin and statin were started.  Discussed at length with Angel who felt starting her on Plavix as well would be too high a bleeding risk.  Patient was evaluated by PT and OT as well.    On day of discharge, I noted the patient  was having some bradycardia on cardiac monitoring.  This appeared to be sinus bradycardia.  EKG was ordered which revealed normal sinus rhythm.  Home dementia meds were started on 04/26.  This was including donepezil which has a known side effect of  bradycardia.  Recommend holding donepezil at the moment.  Of note, WBC elevated, likely due to steroid effect.  No new signs or symptoms of infection.    The patient's chronic medical conditions were managed appropriately.  Discharge planning discussed at length with Susan.  Patient has reached maximum benefit of inpatient hospitalization and is medically stable for discharge to SNF.       Goals of Care Treatment Preferences:  Code Status: DNR          What is most important right now is to focus on comfort and QOL .  Accordingly, we have decided that the best plan to meet the patient's goals includes enrolling in hospice care.      Consults:   Consults (From admission, onward)        Status Ordering Provider     Inpatient consult to Palliative Care  Once        Provider:  (Not yet assigned)    Completed MARCELA BEGUM III     IP consult to case management  Once        Provider:  (Not yet assigned)    Acknowledged MARCELA BEGUM III     Inpatient consult to Registered Dietitian/Nutritionist  Once        Provider:  (Not yet assigned)    Completed PIPPA ELISE     Inpatient consult to Critical Care Medicine  Once        Provider:  (Not yet assigned)    Completed EMANUEL AVILES          No new Assessment & Plan notes have been filed under this hospital service since the last note was generated.  Service: Hospital Medicine    Final Active Diagnoses:    Diagnosis Date Noted POA    PRINCIPAL PROBLEM:  CVA (cerebral vascular accident) [I63.9] 04/23/2023 Yes    Pyoderma gangrenosum [L88]  Yes    Encounter for palliative care [Z51.5] 04/25/2023 Not Applicable    Counseling regarding advanced care planning and goals of care [Z71.89]  04/25/2023 Not Applicable    JOSEFA (acute kidney injury) [N17.9] 04/24/2023 Yes    Bacteremia [R78.81] 04/24/2023 Yes    Hypernatremia [E87.0] 04/21/2023 Yes    Dementia [F03.90] 04/21/2023 Yes    Essential hypertension [I10]  Yes     Chronic      Problems Resolved During this Admission:       Discharged Condition: stable    Disposition: Skilled Nursing Facility    Follow Up:    Patient Instructions:      Diet diabetic   Order Comments: Assistance with meals, Eliminate distractions, Feed only when awake/alert, Frequent oral care, HOB to 90 degrees, Meds whole 1 at a time, Monitor for s/s of aspiration, Remain upright 30 minutes post meal, Small bites/sips, and Standard aspiration precautions     Diet Cardiac     Activity as tolerated       Significant Diagnostic Studies: Labs:   [facility]   Recent Labs   Lab 04/27/23  0517 04/28/23  0308    142   K 3.4* 3.9    108   CO2 23 26   GLU 78 114*   BUN 15 13   CREATININE 0.8 0.9   CALCIUM 8.7 8.7   ANIONGAP 9 8       Pending Diagnostic Studies:     None         Medications:  Reconciled Home Medications:      Medication List      START taking these medications    aspirin 81 MG EC tablet  Commonly known as: ECOTRIN  Take 1 tablet (81 mg total) by mouth once daily.     atorvastatin 40 MG tablet  Commonly known as: LIPITOR  Take 1 tablet (40 mg total) by mouth once daily.     prednisoLONE 15 mg/5 mL (3 mg/mL) solution  Commonly known as: ORAPRED  Take 20 mLs (60 mg total) by mouth once daily. 60 mg x 7 total days, last day 5/3  40 mg x 7 total days, last day 5/10  20 mg x 7 total days, last day 5/17  10 mg of 7 total days, last day 5/24        CONTINUE taking these medications    econazole nitrate 1 % cream  Apply topically once daily.     hydrocortisone 2.5 % cream  Apply topically 3 (three) times daily.     memantine 10 MG Tab  Commonly known as: NAMENDA  Take 1 tablet (10 mg total) by mouth 2 (two) times daily. For memory     mupirocin 2 % ointment  Commonly known  as: BACTROBAN  Apply topically 2 (two) times daily.     QUEtiapine 25 MG Tab  Commonly known as: SEROQUEL  Take 1 tablet (25 mg total) by mouth nightly.        STOP taking these medications    amLODIPine 5 MG tablet  Commonly known as: NORVASC     donepeziL 5 MG tablet  Commonly known as: ARICEPT     haloperidoL 0.5 MG tablet  Commonly known as: HALDOL     losartan 50 MG tablet  Commonly known as: COZAAR            Indwelling Lines/Drains at time of discharge:   Lines/Drains/Airways     None                 Time spent on the discharge of patient: 45 minutes         Andres Dukes III, MD  Department of Hospital Medicine  [facility] - Intensive Care ([place]-14)

## 2023-04-29 NOTE — HOSPITAL COURSE
Patient admitted initially to MICU for altered mentation, dehydration, and severe electrolyte abnormalities including hypernatremia, hyperchloremia.  Hypotonic IV fluids were started and sodium was corrected at a deliberate pace.  Patient was stepped down to Hospital Medicine.  I called the patient's son's Susan for collateral information.  Family informed me that cognitive decline was rather rapid over the last several weeks.  Patient reportedly had a fall with an orbit fracture recently.  Patient recently less interactive with decreased p.o. intake.  The rather rapid onset of of the symptoms concerning for other neurologic process.  MRI brain concerning for recent to subacute left parietal stroke.  I discussed with patient's son, Angel, who is an emergency room physician.  At that time, patient was minimally alert, not talking, and unable to take anything p.o..  Decision at that time was made to forego any treatment especially antiplatelets that would require rectal administration.    On initial presentation, there was concern for infection as patient has a left lower extremity chronic ulcer that has proven difficult to heal.  Family says the patient has been dealing with this lesion for over 20 years.  Reportedly it has been diagnosed as pyoderma gangrenosum.  The left lower extremity was not convincing for infection so antibiotics were discontinued and steroids started as it was felt this may provide patient some relief.  Lesion rapidly improved with IV steroids.  I contacted Dermatology who said they can see the patient in the office and I provided business cards to Chidi.  On admission, patient's bilateral lower extremities were significantly edematous.  None of the patient's medications were started, including amlodipine.  This medication is associated with bilateral lower extremity edema.  Recommend patient hold this medication because lower extremity edema may further complicate healing of this  wound.  Discussed at length with Angel    On 04/26, patient was much more alert and interactive.  ST was consulted and cleared patient for regular diet with thin liquids.  Once patient was able to tolerate p.o., the family no longer wanted to proceed with hospice.  Aspirin and statin were started.  Discussed at length with Angel who felt starting her on Plavix as well would be too high a bleeding risk.  Patient was evaluated by PT and OT as well.    On day of discharge, I noted the patient was having some bradycardia on cardiac monitoring.  This appeared to be sinus bradycardia.  EKG was ordered which revealed normal sinus rhythm.  Home dementia meds were started on 04/26.  This was including donepezil which has a known side effect of  bradycardia.  Recommend holding donepezil at the moment.  Of note, WBC elevated, likely due to steroid effect.  No new signs or symptoms of infection.    The patient's chronic medical conditions were managed appropriately.  Discharge planning discussed at length with Susan.  Patient has reached maximum benefit of inpatient hospitalization and is medically stable for discharge to SNF.

## 2023-05-02 NOTE — ASSESSMENT & PLAN NOTE
Impression:  Palliative Medicine consulted for goals of care/end of life planning for this 73 y/o female being followed by Hospital Medicine for CVA, Dementia, JOSEFA, Hypernatremia, Hypertension, and Pyoderma gangrenosum. [place] [facility] [place] [facility] for AMS and failure to thrive with poor PO intake. Labs in the ED notable for WBC 14, sed rate >120, , Na 162, Cl 119, BUN 61, and Cr 1.8. CT head with no acute findings. She was hypotensive in the 60s/30s, IV fluids and IV abx initiated. She was admitted to Critical Care on  with AMS and hypernatremia that improved with D5 infusion. She was stepped down to Hospital Medicine the same day. MRI ordered for evaluation of LLE wound and showed soft tissue ulceration and suspected cellulitis with no evidence of osteomyelitis. MRI brain concerning for acute/recent infarction. Patient seen by SLP this morning and cleared for regular diet. She is more alert on exam today, speaks few words. Her two sons, daughter-in-law, and two young grandchildren are visiting at bedside.    Plan/Recommendations:  1. See goals of care discussion below.  2. Code status DNR.  3. Family has decided against peg placement. Patient now cleared for regular diet.  4. Anticipate discharge to NH +/- hospice.  5. Palliative Medicine will continue to follow up as needed.    CVA/Dementia/JOSEFA/Hypernatremia/Hypertension/Pyoderma gangrenosum - management per primary team/other consultants    Goals of Care/Advance Care Plannin/26 Follow up visit held with patient today along with palliative  Amparo. Patient is more alert today and several family members are visiting at bedside. She has been cleared for a regular diet by SLP and her son Angel says that she drank a little today. We talked about offering her food and drink that she enjoys and taking cues from her as to how much she wants, with the understanding that it may not be much. Given her improved level of  alertness and ability to swallow safely, family members are now reconsidering hospice. Given her overall debility, decreased PO intake, poor nutritional status and nonhealing wound she would likely still qualify for hospice if/when family is ready. For now we will continue with conservative treatment while arrangements are underway for discharge to nursing home.  _________________________________    4/25 Conference conducted by this NP and ROXY Do LCSW with patients magno Ortiz to discuss her current clinical status, goals of care, long term expected outcomes and prognostic awareness. After a discussion concerning the patients values and wishes, son verbalized excellent understanding and insight as it relates to her prognostic awareness. His stated goal is for the patient to be comfortable. He and his brother are the patients legal next of kin and share in medical decision making. They have jointly agreed to DNR code status. Son is aware that the patients PO intake has been poor but would not want to put her through peg tube placement. We discussed the option of pleasure feeds as tolerated. Son asked about her life expectancy and I explained that we can never say for sure, but that it will depend on how much she is eating or drinking. Explained that she might have days to weeks unless her oral intake increases significantly.    Advance Care Planning     [facility]  We engaged the patient's son in a conversation about advance care planning and we specifically addressed what the goals of care would be moving forward, in light of the patient's change in clinical status, specifically her advanced dementia, new CVA, poor PO intake, and overall debility. We did specifically address the patient's likely prognosis, which is poor. We explored the patient's values and preferences for future care. The son endorses that what is most important right now is to focus on comfort and QOL     Accordingly, we have decided that the best  plan to meet the patient's goals includes enrolling in hospice care. I did explain the role for hospice care at this stage of the patient's illness, including its ability to help the patient live with the best quality of life possible. We will be making a hospice referral. Patient will likely return to [facility] with hospice although son will discuss with his brother about whether home hospice is a realistic option.      Case discussed today with Dr. Dukes and primary KANDY Ross.

## 2023-05-03 NOTE — PROGRESS NOTES
[facility] - Intensive Care ([place] [id number])  Palliative Medicine  Progress Note  4/26/2023    Patient Name: Miranda Houston  MRN: [id number]  Admission Date: 4/21/2023  Hospital Length of Stay: 5 days  Code Status: DNR   Attending Provider: Andres Dukes III,*  Consulting Provider: Nita Crowell NP  Primary Care Physician: Sabino Calle MD  Principal Problem:CVA (cerebral vascular accident)    Patient information was obtained from relative(s), past medical records and primary team.      Assessment/Plan:     Palliative Care  Encounter for palliative care  Impression:  Palliative Medicine consulted for goals of care/end of life planning for this 73 y/o female being followed by Hospital Medicine for CVA, Dementia, JOSEFA, Hypernatremia, Hypertension, and Pyoderma gangrenosum. She presented to [facility] on 4/22 from [facility] for AMS and failure to thrive with poor PO intake. Labs in the ED notable for WBC 14, sed rate >120, , Na 162, Cl 119, BUN 61, and Cr 1.8. CT head with no acute findings. She was hypotensive in the 60s/30s, IV fluids and IV abx initiated. She was admitted to Critical Care on 4/22 with AMS and hypernatremia that improved with D5 infusion. She was stepped down to Hospital Medicine the same day. MRI ordered for evaluation of LLE wound and showed soft tissue ulceration and suspected cellulitis with no evidence of osteomyelitis. MRI brain concerning for acute/recent infarction. Patient seen by SLP this morning and cleared for regular diet. She is more alert on exam today, speaks few words. Her two sons, daughter-in-law, and two young grandchildren are visiting at bedside.    Plan/Recommendations:  1. See goals of care discussion below.  2. Code status DNR.  3. Family has decided against peg placement. Patient now cleared for regular diet.  4. Anticipate discharge to NH +/- hospice.  5. Palliative Medicine will continue to follow up as  needed.    CVA/Dementia/JOSEFA/Hypernatremia/Hypertension/Pyoderma gangrenosum - management per primary team/other consultants    Goals of Care/Advance Care Plannin/26 Follow up visit held with patient today along with palliative  Amparo. Patient is more alert today and several family members are visiting at bedside. She has been cleared for a regular diet by SLP and her son Angel says that she drank a little today. We talked about offering her food and drink that she enjoys and taking cues from her as to how much she wants, with the understanding that it may not be much. Given her improved level of alertness and ability to swallow safely, family members are now reconsidering hospice. Given her overall debility, decreased PO intake, poor nutritional status and nonhealing wound she would likely still qualify for hospice if/when family is ready. For now we will continue with conservative treatment while arrangements are underway for discharge to nursing home.  _________________________________     Conference conducted by this NP and ROXY Do LCSW with patients magno Ortiz to discuss her current clinical status, goals of care, long term expected outcomes and prognostic awareness. After a discussion concerning the patients values and wishes, son verbalized excellent understanding and insight as it relates to her prognostic awareness. His stated goal is for the patient to be comfortable. He and his brother are the patients legal next of kin and share in medical decision making. They have jointly agreed to DNR code status. Son is aware that the patients PO intake has been poor but would not want to put her through peg tube placement. We discussed the option of pleasure feeds as tolerated. Son asked about her life expectancy and I explained that we can never say for sure, but that it will depend on how much she is eating or drinking. Explained that she might have days to weeks unless her oral intake  increases significantly.    Advance Care Planning     [facility]  We engaged the patient's son in a conversation about advance care planning and we specifically addressed what the goals of care would be moving forward, in light of the patient's change in clinical status, specifically her advanced dementia, new CVA, poor PO intake, and overall debility. We did specifically address the patient's likely prognosis, which is poor. We explored the patient's values and preferences for future care. The son endorses that what is most important right now is to focus on comfort and QOL     Accordingly, we have decided that the best plan to meet the patient's goals includes enrolling in hospice care. I did explain the role for hospice care at this stage of the patient's illness, including its ability to help the patient live with the best quality of life possible. We will be making a hospice referral. Patient will likely return to [facility] with hospice although son will discuss with his brother about whether home hospice is a realistic option.     Case discussed today with Dr. Dukes and primary KANDY Ross.        I will follow-up with patient. Please contact us if you have any additional questions.    Subjective:     Chief Complaint:   Chief Complaint   Patient presents with    Failure To Thrive     Pt coming from [facility] for decreased mental status and failure to thrive. Per EMS faculty state pt has been not eating and not responding normally for several days. Per EMS pt did respond to pain but non-verbal. History of dementia        HPI:   Ms. Houston is a 71 y/o female with PMHx of advanced dementia, HTN, HLD, pyoderma gangrenosum, and chronic rhinitis who [place] [facility] [place] and failure to thrive. Per nursing home records, patient has had poor PO intake and decreased mentation. Labs in the ED notable for WBC 14, sed rate >120, , Na 162, Cl 119, BUN 61, and Cr 1.8. CT head with  no acute findings. Patient was hypotensive in the 60s/30s, IV fluids and IV abx initiated. She was admitted to Critical Care on  with AMS and hypernatremia that improved with D5 infusion. She was stepped down to Hospital Medicine the same day. MRI ordered for evaluation of LLE wound and showed soft tissue ulceration and suspected cellulitis with no evidence of osteomyelitis. MRI brain concerning for acute/recent infarction. Patient seen by SLP on  and cleared for regular diet.      Hospital Course:  No notes on file    Interval History: Patient more alert on exam today, speaks few words. Her two sons, daughter-in-law, and two young grandchildren are visiting at bedside.    Past Medical History:   Diagnosis Date    Chronic rhinitis     Colon polyps     Hyperlipidemia     Hypertension     Pyoderma gangrenosum     left leg    Steroid-induced diabetes mellitus        Past Surgical History:   Procedure Laterality Date     SECTION      2 surgeries.       Review of patient's allergies indicates:   Allergen Reactions    No known drug allergies        Medications:  Continuous Infusions:   lactated ringers 50 mL/hr at 23 0513     Scheduled Meds:   aspirin  81 mg Oral Daily    atorvastatin  40 mg Oral Daily    donepeziL  5 mg Oral QAM    memantine  10 mg Oral BID    methylPREDNISolone sodium succinate injection  40 mg Intravenous Daily    mupirocin   Nasal BID    QUEtiapine  25 mg Oral Nightly     PRN Meds:glucagon (human recombinant)    Family History       Problem Relation (Age of Onset)    Cancer Mother, Father (70)    Diabetes Sister    Heart disease Mother    Hypertension Mother, Sister    Seizures Brother          Tobacco Use    Smoking status: Former     Packs/day: 0.25     Years: 7.00     Pack years: 1.75     Types: Cigarettes     Quit date: 1997     Years since quittin.7    Smokeless tobacco: Never   Substance and Sexual Activity    Alcohol use: Yes     Alcohol/week:  7.0 standard drinks     Types: 7 Glasses of wine per week    Drug use: No    Sexual activity: Not Currently       Review of Systems   Unable to perform ROS: Dementia   Objective:     Vital Signs (Most Recent):  Temp: 97.4 °F (36.3 °C) (04/26/23 1345)  Pulse: 85 (04/26/23 1516)  Resp: 16 (04/26/23 1345)  BP: 139/73 (04/26/23 1345)  SpO2: 100 % (04/26/23 1345) Vital Signs (24h Range):  Temp:  [97.4 °F (36.3 °C)-98.2 °F (36.8 °C)] 97.4 °F (36.3 °C)  Pulse:  [65-89] 85  Resp:  [11-36] 16  SpO2:  [98 %-100 %] 100 %  BP: (121-161)/(66-84) 139/73     Weight: 88 kg (194 lb)  Body mass index is 33.3 kg/m².    Physical Exam  Vitals and nursing note reviewed.   Constitutional:       General: She is not in acute distress.     Appearance: She is ill-appearing.   HENT:      Head: Normocephalic and atraumatic.   Eyes:      Extraocular Movements: Extraocular movements intact.      Conjunctiva/sclera: Conjunctivae normal.   Cardiovascular:      Rate and Rhythm: Normal rate and regular rhythm.   Pulmonary:      Effort: Pulmonary effort is normal. No respiratory distress.   Abdominal:      General: There is no distension.      Palpations: Abdomen is soft.   Musculoskeletal:      Right lower leg: No edema.      Left lower leg: No edema.   Skin:     General: Skin is warm and dry.      Comments: LLE wound wrapped   Neurological:      Mental Status: She is alert.      Motor: Weakness present.      Comments: Oriented to self only       Review of Symptoms      Symptom Assessment (ESAS 0-10 Scale)  Unable to complete assessment due to Dementia         Pain Assessment:  OME in 24 hours:  0  Location(s):      Pain Assessment in Advanced Demential Scale (PAINAD)   Breathing - Independent of vocalization:  0  Negative vocalization:  0  Facial expression:  0  Body language:  0  Consolability:  0  Total:  0    Performance Status:  20    Living Arrangements:  Lives in nursing home    Psychosocial/Cultural:   See Palliative Psychosocial Note:  Yes  Patient has been a resident of [facility] since 2020. She is not . She has two sons, three grandchildren and one on the way. She is a retired . Her family's goal is for her to be comfortable.  **Primary  to Follow**  Palliative Care  Consult: Yes    Spiritual:  F - Kamila and Belief:  Gnosticist  I - Importance:  Very important  A - Address in Care:  / visits      Advance Care Planning   Advance Directives:   Living Will: No    LaPOST: No    Do Not Resuscitate Status: Yes    Medical Power of : No      Decision Making:  Family answered questions and Patient unable to communicate due to disease severity/cognitive impairment  Goals of Care: The family endorses that what is most important right now is to focus on improvement in condition but with limits to invasive therapies    Accordingly, we have decided that the best plan to meet the patient's goals includes continuing with treatment         Significant Labs: All pertinent labs within the past 24 hours have been reviewed.  CBC:   Recent Labs   Lab 04/24/23  0406   WBC 11.09   HGB 11.2*   HCT 37.1   MCV 95          BMP:  No results for input(s): GLU, NA, K, CL, CO2, BUN, CREATININE, CALCIUM, MG in the last 24 hours.  LFT:  Lab Results   Component Value Date    AST 24 04/24/2023    ALKPHOS 59 04/24/2023    BILITOT 0.5 04/24/2023     Albumin:   Albumin   Date Value Ref Range Status   04/24/2023 2.0 (L) 3.5 - 5.2 g/dL Final     Protein:   Total Protein   Date Value Ref Range Status   04/24/2023 6.9 6.0 - 8.4 g/dL Final     Lactic acid:   Lab Results   Component Value Date    LACTATE 2.0 03/12/2011    LACTATE 2.0 03/11/2011       Significant Imaging: I have reviewed all pertinent imaging results/findings within the past 24 hours.        Nita Crowell NP  Palliative Medicine  [facility] - Intensive Care ([place]

## 2023-05-03 NOTE — PROGRESS NOTES
[facility] - Intensive Care ([place] [address])  Palliative Medicine  Progress Note  4/28/2023    Patient Name: Miranda Houston  MRN: [id number]  Admission Date: 4/21/2023  Hospital Length of Stay: 7 days  Code Status: DNR   Attending Provider: Andres Dukes III,*  Consulting Provider: Nita Crowell NP  Primary Care Physician: Sabino Calle MD  Principal Problem:CVA (cerebral vascular accident)    Patient information was obtained from relative(s), past medical records and primary team.      Assessment/Plan:     Palliative Care  Encounter for palliative care  Impression:  Palliative Medicine consulted for goals of care/end of life planning for this 71 y/o female being followed by Hospital Medicine for CVA, Dementia, JOSEFA, Hypernatremia, Hypertension, and Pyoderma gangrenosum. She presented to C on 4/22 from [facility] for AMS and failure to thrive with poor PO intake. Labs in the ED notable for WBC 14, sed rate >120, , Na 162, Cl 119, BUN 61, and Cr 1.8. CT head with no acute findings. She was hypotensive in the 60s/30s, IV fluids and IV abx initiated. She was admitted to Critical Care on 4/22 with AMS and hypernatremia that improved with D5 infusion. She was stepped down to Hospital Medicine the same day. MRI ordered for evaluation of LLE wound and showed soft tissue ulceration and suspected cellulitis with no evidence of osteomyelitis. MRI brain concerning for acute/recent infarction. Patient seen by SLP on 4/26 and cleared for regular diet. Patient is alert on exam today, oriented to self only and speaks few words. Discharge to nursing home pending.    Plan/Recommendations:  1. See goals of care discussion below.  2. Code status DNR.  3. Family has decided against peg placement. Patient now cleared for regular diet.  4. Discharge to nursing home planned for today.  5. Palliative Medicine will sign off. Please reconsult if discharge plans change and further assistance is  needed.    CVA/Dementia/JOSEFA/Hypernatremia/Hypertension/Pyoderma gangrenosum - management per primary team/other consultants    Goals of Care/Advance Care Plannin/28 Follow up visit held with patient today. She is alert and oriented to self only, speaks few words. Called and spoke with son Angel who reports that he is in the car on the road back to [place]. Family has decided not to pursue hospice at this point but they are aware that it remains an option for the patient if they decide to reconsider at a later time. Shared in son's hope for her condition to remain stable and for her oral intake to improve. Explained that if her condition declines further and she winds up back in the hospital we may need to revisit the plan of care. Son has my contact information to call if anything changes and he would like to discuss further.  __________________________________     Follow up visit held with patient today along with palliative  Amparo. Patient is more alert today and several family members are visiting at bedside. She has been cleared for a regular diet by SLP and her son Angel says that she drank a little today. We talked about offering her food and drink that she enjoys and taking cues from her as to how much she wants, with the understanding that it may not be much. Given her improved level of alertness and ability to swallow safely, family members are now reconsidering hospice. Given her overall debility, decreased PO intake, poor nutritional status and nonhealing wound she would likely still qualify for hospice if/when family is ready. For now we will continue with conservative treatment while arrangements are underway for discharge to nursing home.  _________________________________     Conference conducted by carlos alberto NP and ROXY Do LCSW with patients magno Ortiz to discuss her current clinical status, goals of care, long term expected outcomes and prognostic awareness. After a discussion  concerning the patients values and wishes, son verbalized excellent understanding and insight as it relates to her prognostic awareness. His stated goal is for the patient to be comfortable. He and his brother are the patients legal next of kin and share in medical decision making. They have jointly agreed to DNR code status. Son is aware that the patients PO intake has been poor but would not want to put her through peg tube placement. We discussed the option of pleasure feeds as tolerated. Son asked about her life expectancy and I explained that we can never say for sure, but that it will depend on how much she is eating or drinking. Explained that she might have days to weeks unless her oral intake increases significantly.    Advance Care Planning     [facility]  We engaged the patient's son in a conversation about advance care planning and we specifically addressed what the goals of care would be moving forward, in light of the patient's change in clinical status, specifically her advanced dementia, new CVA, poor PO intake, and overall debility. We did specifically address the patient's likely prognosis, which is poor. We explored the patient's values and preferences for future care. The son endorses that what is most important right now is to focus on comfort and QOL     Accordingly, we have decided that the best plan to meet the patient's goals includes enrolling in hospice care. I did explain the role for hospice care at this stage of the patient's illness, including its ability to help the patient live with the best quality of life possible. We will be making a hospice referral. Patient will likely return to [facility] with hospice although son will discuss with his brother about whether home hospice is a realistic option.     Case discussed today with Dr. Dukes and primary KANDY Ross.        I will sign off. Please contact us if you have any additional questions.    Subjective:     Chief  Complaint:   Chief Complaint   Patient presents with    Failure To Thrive     Pt coming from [facility] for decreased mental status and failure to thrive. Per EMS faculty state pt has been not eating and not responding normally for several days. Per EMS pt did respond to pain but non-verbal. History of dementia        HPI:   Ms. Houston is a 73 y/o female with PMHx of advanced dementia, HTN, HLD, pyoderma gangrenosum, and chronic rhinitis who presented to the ED at [facility] on  for AMS and failure to thrive. Per nursing home records, patient has had poor PO intake and decreased mentation. Labs in the ED notable for WBC 14, sed rate >120, , Na 162, Cl 119, BUN 61, and Cr 1.8. CT head with no acute findings. Patient was hypotensive in the 60s/30s, IV fluids and IV abx initiated. She was admitted to Critical Care on  with AMS and hypernatremia that improved with D5 infusion. She was stepped down to Hospital Medicine the same day. MRI ordered for evaluation of LLE wound and showed soft tissue ulceration and suspected cellulitis with no evidence of osteomyelitis. MRI brain concerning for acute/recent infarction. Patient seen by SLP on  and cleared for regular diet.      Hospital Course:  No notes on file    Interval History: Patient is alert on exam today, oriented to self only and speaks few words. Discharge to nursing home planned for today.    Past Medical History:   Diagnosis Date    Chronic rhinitis     Colon polyps     Hyperlipidemia     Hypertension     Pyoderma gangrenosum     left leg    Steroid-induced diabetes mellitus        Past Surgical History:   Procedure Laterality Date     SECTION      2 surgeries.       Review of patient's allergies indicates:   Allergen Reactions    No known drug allergies        Medications:  Continuous Infusions:   lactated ringers 50 mL/hr at 23 0586      Scheduled Meds:   aspirin  81 mg Oral Daily    atorvastatin  40 mg Oral Daily     donepezil  5 mg Oral Daily    enoxaparin  40 mg Subcutaneous Daily    memantine  10 mg Oral BID    prednisoLONE 15 mg/5ml solution  60 mg Oral Daily    QUEtiapine  25 mg Oral QHS      PRN Meds: glucagon (human recombinant)    Family History       Problem Relation (Age of Onset)    Cancer Mother, Father (70)    Diabetes Sister    Heart disease Mother    Hypertension Mother, Sister    Seizures Brother          Tobacco Use    Smoking status: Former     Packs/day: 0.25     Years: 7.00     Pack years: 1.75     Types: Cigarettes     Quit date: 1997     Years since quittin.7    Smokeless tobacco: Never   Substance and Sexual Activity    Alcohol use: Yes     Alcohol/week: 7.0 standard drinks     Types: 7 Glasses of wine per week    Drug use: No    Sexual activity: Not Currently       Review of Systems   Unable to perform ROS: Dementia   Objective:     Vital Signs (Most Recent):  Temp: 97.9 °F (36.6 °C) (23 1215)  Pulse: 75 (23 1451)  Resp: 18 (23 0830)  BP: (!) 154/93 (23 1215)  SpO2: 100 % (23 1451) Vital Signs (24h Range):   Temp:  [97.5 °F (36.4 °C)-98.5 °F (36.9 °C)] 97.9 °F (36.6 °C)  Pulse:  [67-98] 75  Resp:  [18] 18  SpO2:  [100 %] 100 %  BP: (114-154)/(68-99) 154/93     Weight: 88 kg (194 lb)  Body mass index is 33.3 kg/m².    Physical Exam  Vitals and nursing note reviewed.   Constitutional:       General: She is not in acute distress.     Appearance: She is ill-appearing.   HENT:      Head: Normocephalic and atraumatic.   Eyes:      Extraocular Movements: Extraocular movements intact.      Conjunctiva/sclera: Conjunctivae normal.   Cardiovascular:      Rate and Rhythm: Normal rate and regular rhythm.   Pulmonary:      Effort: Pulmonary effort is normal. No respiratory distress.   Abdominal:      General: There is no distension.      Palpations: Abdomen is soft.   Musculoskeletal:      Right lower leg: No edema.      Left lower leg: No edema.   Skin:     General:  Skin is warm and dry.      Comments: LLE wound wrapped   Neurological:      Mental Status: She is alert.      Motor: Weakness present.      Comments: Oriented to self only       Review of Symptoms      Symptom Assessment (ESAS 0-10 Scale)  Unable to complete assessment due to Dementia         Pain Assessment:  OME in 24 hours:  0  Location(s):      Pain Assessment in Advanced Demential Scale (PAINAD)   Breathing - Independent of vocalization:  0  Negative vocalization:  0  Facial expression:  0  Body language:  0  Consolability:  0  Total:  0    Performance Status:  30    Living Arrangements:  Lives in nursing home    Psychosocial/Cultural:   See Palliative Psychosocial Note: Yes  Patient has been a resident of [facility] since 2020. She is not . She has two sons, three grandchildren and one on the way. She is a retired . Her family's goal is for her to be comfortable.  **Primary  to Follow**  Palliative Care  Consult: Yes    Spiritual:  F - Kamila and Belief:  Yarsanism  I - Importance:  Very important  A - Address in Care:  / visits      Advance Care Planning   Advance Directives:   Living Will: No    LaPOST: No    Do Not Resuscitate Status: Yes    Medical Power of : No      Decision Making:  Family answered questions and Patient unable to communicate due to disease severity/cognitive impairment  Goals of Care: The family endorses that what is most important right now is to focus on improvement in condition but with limits to invasive therapies and comfort and QOL     Accordingly, we have decided that the best plan to meet the patient's goals includes continuing with treatment         Significant Labs: All pertinent labs within the past 24 hours have been reviewed.  CBC:   Recent Labs   Lab 04/28/23  0308   WBC 15.98*   HGB 11.1*   HCT 37.1   MCV 95   *       BMP:  No results for input(s): GLU, NA, K, CL, CO2, BUN,  CREATININE, CALCIUM, MG in the last 24 hours.    LFT:  Lab Results   Component Value Date    AST 24 04/24/2023    ALKPHOS 59 04/24/2023    BILITOT 0.5 04/24/2023     Albumin:   Albumin   Date Value Ref Range Status   04/24/2023 2.0 (L) 3.5 - 5.2 g/dL Final     Protein:   Total Protein   Date Value Ref Range Status   04/24/2023 6.9 6.0 - 8.4 g/dL Final     Lactic acid:   Lab Results   Component Value Date    LACTATE 2.0 03/12/2011    LACTATE 2.0 03/11/2011       Significant Imaging: I have reviewed all pertinent imaging results/findings within the past 24 hours.        Nita Crowell NP  Palliative Medicine  [facility] [address] - Intensive Care ([place]-[address])

## 2023-05-03 NOTE — ASSESSMENT & PLAN NOTE
Impression:  Palliative Medicine consulted for goals of care/end of life planning for this 73 y/o female being followed by Hospital Medicine for CVA, Dementia, JOSEFA, Hypernatremia, Hypertension, and Pyoderma gangrenosum. She presented to [facility] on  from [facility] for AMS and failure to thrive with poor PO intake. Labs in the ED notable for WBC 14, sed rate >120, , Na 162, Cl 119, BUN 61, and Cr 1.8. CT head with no acute findings. She was hypotensive in the 60s/30s, IV fluids and IV abx initiated. She was admitted to Critical Care on  with AMS and hypernatremia that improved with D5 infusion. She was stepped down to Hospital Medicine the same day. MRI ordered for evaluation of LLE wound and showed soft tissue ulceration and suspected cellulitis with no evidence of osteomyelitis. MRI brain concerning for acute/recent infarction. Patient seen by SLP on  and cleared for regular diet. Patient is alert on exam today, oriented to self only and speaks few words. Discharge to nursing home pending.    Plan/Recommendations:  1. See goals of care discussion below.  2. Code status DNR.  3. Family has decided against peg placement. Patient now cleared for regular diet.  4. Discharge to nursing home planned for today.  5. Palliative Medicine will sign off. Please reconsult if discharge plans change and further assistance is needed.    CVA/Dementia/JOSEFA/Hypernatremia/Hypertension/Pyoderma gangrenosum - management per primary team/other consultants    Goals of Care/Advance Care Plannin/28 Follow up visit held with patient today. She is alert and oriented to self only, speaks few words. Called and spoke with son Angel who reports that he is in the car on the road back to [place]. Family has decided not to pursue hospice at this point but they are aware that it remains an option for the patient if they decide to reconsider at a later time. Shared in son's hope for her condition to remain stable and  for her oral intake to improve. Explained that if her condition declines further and she winds up back in the hospital we may need to revisit the plan of care. Son has my contact information to call if anything changes and he would like to discuss further.  __________________________________    4/26 Follow up visit held with patient today along with palliative  Amparo. Patient is more alert today and several family members are visiting at bedside. She has been cleared for a regular diet by SLP and her son Angel says that she drank a little today. We talked about offering her food and drink that she enjoys and taking cues from her as to how much she wants, with the understanding that it may not be much. Given her improved level of alertness and ability to swallow safely, family members are now reconsidering hospice. Given her overall debility, decreased PO intake, poor nutritional status and nonhealing wound she would likely still qualify for hospice if/when family is ready. For now we will continue with conservative treatment while arrangements are underway for discharge to nursing home.  _________________________________    4/25 Conference conducted by this NP and ROXY Do LCSW with patients magno Ortiz to discuss her current clinical status, goals of care, long term expected outcomes and prognostic awareness. After a discussion concerning the patients values and wishes, son verbalized excellent understanding and insight as it relates to her prognostic awareness. His stated goal is for the patient to be comfortable. He and his brother are the patients legal next of kin and share in medical decision making. They have jointly agreed to DNR code status. Son is aware that the patients PO intake has been poor but would not want to put her through peg tube placement. We discussed the option of pleasure feeds as tolerated. Son asked about her life expectancy and I explained that we can never say for sure,  but that it will depend on how much she is eating or drinking. Explained that she might have days to weeks unless her oral intake increases significantly.    Advance Care Planning     [facility]  We engaged the patient's son in a conversation about advance care planning and we specifically addressed what the goals of care would be moving forward, in light of the patient's change in clinical status, specifically her advanced dementia, new CVA, poor PO intake, and overall debility. We did specifically address the patient's likely prognosis, which is poor. We explored the patient's values and preferences for future care. The son endorses that what is most important right now is to focus on comfort and QOL     Accordingly, we have decided that the best plan to meet the patient's goals includes enrolling in hospice care. I did explain the role for hospice care at this stage of the patient's illness, including its ability to help the patient live with the best quality of life possible. We will be making a hospice referral. Patient will likely return to [facility] with hospice although son will discuss with his brother about whether home hospice is a realistic option.      Case discussed today with Dr. Dukes and primary SW Vandana.

## 2023-05-03 NOTE — SUBJECTIVE & OBJECTIVE
Interval History: Patient is alert on exam today, oriented to self only and speaks few words. Discharge to nursing home planned for today.    Past Medical History:   Diagnosis Date    Chronic rhinitis     Colon polyps     Hyperlipidemia     Hypertension     Pyoderma gangrenosum     left leg    Steroid-induced diabetes mellitus        Past Surgical History:   Procedure Laterality Date     SECTION      2 surgeries.       Review of patient's allergies indicates:   Allergen Reactions    No known drug allergies        Medications:  Continuous Infusions:   lactated ringers 50 mL/hr at 23 0513      Scheduled Meds:   aspirin  81 mg Oral Daily    atorvastatin  40 mg Oral Daily    donepezil  5 mg Oral Daily    enoxaparin  40 mg Subcutaneous Daily    memantine  10 mg Oral BID    prednisoLONE 15 mg/5ml solution  60 mg Oral Daily    QUEtiapine  25 mg Oral QHS      PRN Meds: glucagon (human recombinant)    Family History       Problem Relation (Age of Onset)    Cancer Mother, Father (70)    Diabetes Sister    Heart disease Mother    Hypertension Mother, Sister    Seizures Brother          Tobacco Use    Smoking status: Former     Packs/day: 0.25     Years: 7.00     Pack years: 1.75     Types: Cigarettes     Quit date: 1997     Years since quittin.7    Smokeless tobacco: Never   Substance and Sexual Activity    Alcohol use: Yes     Alcohol/week: 7.0 standard drinks     Types: 7 Glasses of wine per week    Drug use: No    Sexual activity: Not Currently       Review of Systems   Unable to perform ROS: Dementia   Objective:     Vital Signs (Most Recent):  Temp: 97.9 °F (36.6 °C) (23 1215)  Pulse: 75 (23 1451)  Resp: 18 (23 0830)  BP: (!) 154/93 (23 1215)  SpO2: 100 % (23 1451) Vital Signs (24h Range):   Temp:  [97.5 °F (36.4 °C)-98.5 °F (36.9 °C)] 97.9 °F (36.6 °C)  Pulse:  [67-98] 75  Resp:  [18] 18  SpO2:  [100 %] 100 %  BP: (114-154)/(68-99) 154/93     Weight: 88 kg (194  lb)  Body mass index is 33.3 kg/m².    Physical Exam  Vitals and nursing note reviewed.   Constitutional:       General: She is not in acute distress.     Appearance: She is ill-appearing.   HENT:      Head: Normocephalic and atraumatic.   Eyes:      Extraocular Movements: Extraocular movements intact.      Conjunctiva/sclera: Conjunctivae normal.   Cardiovascular:      Rate and Rhythm: Normal rate and regular rhythm.   Pulmonary:      Effort: Pulmonary effort is normal. No respiratory distress.   Abdominal:      General: There is no distension.      Palpations: Abdomen is soft.   Musculoskeletal:      Right lower leg: No edema.      Left lower leg: No edema.   Skin:     General: Skin is warm and dry.      Comments: LLE wound wrapped   Neurological:      Mental Status: She is alert.      Motor: Weakness present.      Comments: Oriented to self only       Review of Symptoms      Symptom Assessment (ESAS 0-10 Scale)  Unable to complete assessment due to Dementia         Pain Assessment:  OME in 24 hours:  0  Location(s):      Pain Assessment in Advanced Demential Scale (PAINAD)   Breathing - Independent of vocalization:  0  Negative vocalization:  0  Facial expression:  0  Body language:  0  Consolability:  0  Total:  0    Performance Status:  30    Living Arrangements:  Lives in nursing home    Psychosocial/Cultural:   See Palliative Psychosocial Note: Yes  Patient has been a resident of [facility] since 2020. She is not . She has two sons, three grandchildren and one on the way. She is a retired . Her family's goal is for her to be comfortable.  **Primary  to Follow**  Palliative Care  Consult: Yes    Spiritual:  F - Kamila and Belief:  Yazidi  I - Importance:  Very important  A - Address in Care:  / visits      Advance Care Planning   Advance Directives:   Living Will: No    LaPOST: No    Do Not Resuscitate Status: Yes    Medical Power  of : No      Decision Making:  Family answered questions and Patient unable to communicate due to disease severity/cognitive impairment  Goals of Care: The family endorses that what is most important right now is to focus on improvement in condition but with limits to invasive therapies and comfort and QOL     Accordingly, we have decided that the best plan to meet the patient's goals includes continuing with treatment         Significant Labs: All pertinent labs within the past 24 hours have been reviewed.  CBC:   Recent Labs   Lab 04/28/23  0308   WBC 15.98*   HGB 11.1*   HCT 37.1   MCV 95   *       BMP:  No results for input(s): GLU, NA, K, CL, CO2, BUN, CREATININE, CALCIUM, MG in the last 24 hours.    LFT:  Lab Results   Component Value Date    AST 24 04/24/2023    ALKPHOS 59 04/24/2023    BILITOT 0.5 04/24/2023     Albumin:   Albumin   Date Value Ref Range Status   04/24/2023 2.0 (L) 3.5 - 5.2 g/dL Final     Protein:   Total Protein   Date Value Ref Range Status   04/24/2023 6.9 6.0 - 8.4 g/dL Final     Lactic acid:   Lab Results   Component Value Date    LACTATE 2.0 03/12/2011    LACTATE 2.0 03/11/2011       Significant Imaging: I have reviewed all pertinent imaging results/findings within the past 24 hours.

## 2023-07-24 PROBLEM — I63.9 CVA (CEREBRAL VASCULAR ACCIDENT): Status: RESOLVED | Noted: 2023-04-23 | Resolved: 2023-07-24

## 2023-07-24 PROBLEM — N17.9 AKI (ACUTE KIDNEY INJURY): Status: RESOLVED | Noted: 2023-04-24 | Resolved: 2023-07-24
